# Patient Record
Sex: FEMALE | Race: WHITE | Employment: OTHER | URBAN - METROPOLITAN AREA
[De-identification: names, ages, dates, MRNs, and addresses within clinical notes are randomized per-mention and may not be internally consistent; named-entity substitution may affect disease eponyms.]

---

## 2017-01-13 ENCOUNTER — OFFICE VISIT (OUTPATIENT)
Dept: ORTHOPEDIC SURGERY | Age: 52
End: 2017-01-13

## 2017-01-13 VITALS
BODY MASS INDEX: 37.74 KG/M2 | WEIGHT: 249 LBS | HEIGHT: 68 IN | SYSTOLIC BLOOD PRESSURE: 151 MMHG | DIASTOLIC BLOOD PRESSURE: 88 MMHG | HEART RATE: 83 BPM

## 2017-01-13 DIAGNOSIS — M47.26 OSTEOARTHRITIS OF SPINE WITH RADICULOPATHY, LUMBAR REGION: Primary | ICD-10-CM

## 2017-01-13 PROCEDURE — 99213 OFFICE O/P EST LOW 20 MIN: CPT | Performed by: ORTHOPAEDIC SURGERY

## 2017-01-16 ENCOUNTER — TELEPHONE (OUTPATIENT)
Dept: ORTHOPEDIC SURGERY | Age: 52
End: 2017-01-16

## 2017-01-16 ENCOUNTER — TELEPHONE (OUTPATIENT)
Dept: FAMILY MEDICINE CLINIC | Age: 52
End: 2017-01-16

## 2017-01-19 ENCOUNTER — TELEPHONE (OUTPATIENT)
Dept: ORTHOPEDIC SURGERY | Age: 52
End: 2017-01-19

## 2017-02-19 RX ORDER — LEVOTHYROXINE SODIUM 0.12 MG/1
TABLET ORAL
Qty: 60 TABLET | Refills: 1 | Status: SHIPPED | OUTPATIENT
Start: 2017-02-19 | End: 2017-04-10 | Stop reason: SDUPTHER

## 2017-02-19 RX ORDER — AMLODIPINE BESYLATE 10 MG/1
TABLET ORAL
Qty: 30 TABLET | Refills: 0 | Status: SHIPPED | OUTPATIENT
Start: 2017-02-19 | End: 2017-04-01 | Stop reason: SDUPTHER

## 2017-03-08 ENCOUNTER — TELEPHONE (OUTPATIENT)
Dept: ORTHOPEDIC SURGERY | Age: 52
End: 2017-03-08

## 2017-03-28 ENCOUNTER — OFFICE VISIT (OUTPATIENT)
Dept: ORTHOPEDIC SURGERY | Age: 52
End: 2017-03-28

## 2017-03-28 VITALS
BODY MASS INDEX: 37.74 KG/M2 | WEIGHT: 249 LBS | HEIGHT: 68 IN | DIASTOLIC BLOOD PRESSURE: 86 MMHG | SYSTOLIC BLOOD PRESSURE: 155 MMHG | HEART RATE: 87 BPM

## 2017-03-28 DIAGNOSIS — M51.36 LUMBAR DEGENERATIVE DISC DISEASE: Primary | ICD-10-CM

## 2017-03-28 PROCEDURE — 99213 OFFICE O/P EST LOW 20 MIN: CPT | Performed by: ORTHOPAEDIC SURGERY

## 2017-04-02 RX ORDER — AMLODIPINE BESYLATE 10 MG/1
TABLET ORAL
Qty: 30 TABLET | Refills: 0 | Status: SHIPPED | OUTPATIENT
Start: 2017-04-02 | End: 2017-05-10 | Stop reason: SDUPTHER

## 2017-04-10 ENCOUNTER — PROCEDURE VISIT (OUTPATIENT)
Dept: FAMILY MEDICINE CLINIC | Age: 52
End: 2017-04-10

## 2017-04-10 VITALS
SYSTOLIC BLOOD PRESSURE: 136 MMHG | HEIGHT: 68 IN | DIASTOLIC BLOOD PRESSURE: 80 MMHG | WEIGHT: 245.8 LBS | BODY MASS INDEX: 37.25 KG/M2

## 2017-04-10 DIAGNOSIS — M17.0 PRIMARY OSTEOARTHRITIS OF BOTH KNEES: ICD-10-CM

## 2017-04-10 DIAGNOSIS — R21 RASH: Primary | ICD-10-CM

## 2017-04-10 DIAGNOSIS — E55.9 VITAMIN D DEFICIENCY: ICD-10-CM

## 2017-04-10 DIAGNOSIS — M51.16 LUMBAR DISC DISEASE WITH RADICULOPATHY: ICD-10-CM

## 2017-04-10 PROCEDURE — 11100 PR BIOPSY OF SKIN LESION: CPT | Performed by: FAMILY MEDICINE

## 2017-04-10 PROCEDURE — 11101 PR BIOPSY, EACH ADDED LESION: CPT | Performed by: FAMILY MEDICINE

## 2017-04-10 PROCEDURE — 99214 OFFICE O/P EST MOD 30 MIN: CPT | Performed by: FAMILY MEDICINE

## 2017-04-19 ENCOUNTER — TELEPHONE (OUTPATIENT)
Dept: FAMILY MEDICINE CLINIC | Age: 52
End: 2017-04-19

## 2017-04-20 RX ORDER — PREDNISONE 10 MG/1
10 TABLET ORAL DAILY
Qty: 18 TABLET | Refills: 0 | Status: SHIPPED | OUTPATIENT
Start: 2017-04-20 | End: 2017-11-02 | Stop reason: ALTCHOICE

## 2017-05-10 RX ORDER — AMLODIPINE BESYLATE 10 MG/1
TABLET ORAL
Qty: 30 TABLET | Refills: 0 | Status: SHIPPED | OUTPATIENT
Start: 2017-05-10 | End: 2017-06-10 | Stop reason: SDUPTHER

## 2017-05-10 RX ORDER — LEVOTHYROXINE SODIUM 0.12 MG/1
TABLET ORAL
Qty: 60 TABLET | Refills: 0 | Status: SHIPPED | OUTPATIENT
Start: 2017-05-10 | End: 2017-06-10 | Stop reason: SDUPTHER

## 2017-06-11 RX ORDER — AMLODIPINE BESYLATE 10 MG/1
TABLET ORAL
Qty: 30 TABLET | Refills: 0 | Status: SHIPPED | OUTPATIENT
Start: 2017-06-11 | End: 2017-07-17 | Stop reason: SDUPTHER

## 2017-06-11 RX ORDER — LEVOTHYROXINE SODIUM 0.12 MG/1
TABLET ORAL
Qty: 60 TABLET | Refills: 0 | Status: SHIPPED | OUTPATIENT
Start: 2017-06-11 | End: 2017-07-17 | Stop reason: SDUPTHER

## 2017-07-17 RX ORDER — LEVOTHYROXINE SODIUM 0.12 MG/1
TABLET ORAL
Qty: 60 TABLET | Refills: 0 | Status: SHIPPED | OUTPATIENT
Start: 2017-07-17 | End: 2017-08-14 | Stop reason: SDUPTHER

## 2017-07-17 RX ORDER — AMLODIPINE BESYLATE 10 MG/1
TABLET ORAL
Qty: 30 TABLET | Refills: 0 | Status: SHIPPED | OUTPATIENT
Start: 2017-07-17 | End: 2017-08-14 | Stop reason: SDUPTHER

## 2017-08-15 RX ORDER — LEVOTHYROXINE SODIUM 0.12 MG/1
TABLET ORAL
Qty: 60 TABLET | Refills: 0 | Status: SHIPPED | OUTPATIENT
Start: 2017-08-15 | End: 2017-09-21 | Stop reason: SDUPTHER

## 2017-08-15 RX ORDER — AMLODIPINE BESYLATE 10 MG/1
TABLET ORAL
Qty: 30 TABLET | Refills: 0 | Status: SHIPPED | OUTPATIENT
Start: 2017-08-15 | End: 2017-11-02

## 2017-09-21 DIAGNOSIS — M25.551 PAIN OF BOTH HIP JOINTS: ICD-10-CM

## 2017-09-21 DIAGNOSIS — M25.552 PAIN OF BOTH HIP JOINTS: ICD-10-CM

## 2017-09-21 RX ORDER — AMLODIPINE BESYLATE 10 MG/1
TABLET ORAL
Qty: 30 TABLET | Refills: 0 | Status: SHIPPED | OUTPATIENT
Start: 2017-09-21 | End: 2017-10-29 | Stop reason: SDUPTHER

## 2017-09-21 RX ORDER — LEVOTHYROXINE SODIUM 0.12 MG/1
TABLET ORAL
Qty: 60 TABLET | Refills: 0 | Status: SHIPPED | OUTPATIENT
Start: 2017-09-21 | End: 2017-10-29 | Stop reason: SDUPTHER

## 2017-09-21 RX ORDER — MELOXICAM 15 MG/1
TABLET ORAL
Qty: 30 TABLET | Refills: 1 | Status: SHIPPED | OUTPATIENT
Start: 2017-09-21 | End: 2017-12-01 | Stop reason: SDUPTHER

## 2017-10-29 RX ORDER — AMLODIPINE BESYLATE 10 MG/1
TABLET ORAL
Qty: 30 TABLET | Refills: 0 | Status: SHIPPED | OUTPATIENT
Start: 2017-10-29 | End: 2017-11-02 | Stop reason: SDUPTHER

## 2017-10-29 RX ORDER — LEVOTHYROXINE SODIUM 0.12 MG/1
TABLET ORAL
Qty: 60 TABLET | Refills: 0 | Status: SHIPPED | OUTPATIENT
Start: 2017-10-29 | End: 2017-11-02 | Stop reason: SDUPTHER

## 2017-11-02 ENCOUNTER — OFFICE VISIT (OUTPATIENT)
Dept: FAMILY MEDICINE CLINIC | Age: 52
End: 2017-11-02

## 2017-11-02 VITALS
DIASTOLIC BLOOD PRESSURE: 88 MMHG | SYSTOLIC BLOOD PRESSURE: 144 MMHG | HEIGHT: 68 IN | BODY MASS INDEX: 39.86 KG/M2 | WEIGHT: 263 LBS

## 2017-11-02 DIAGNOSIS — N39.3 STRESS INCONTINENCE: ICD-10-CM

## 2017-11-02 DIAGNOSIS — M15.9 PRIMARY OSTEOARTHRITIS INVOLVING MULTIPLE JOINTS: ICD-10-CM

## 2017-11-02 DIAGNOSIS — R60.1 GENERALIZED EDEMA: Primary | ICD-10-CM

## 2017-11-02 PROCEDURE — 99213 OFFICE O/P EST LOW 20 MIN: CPT | Performed by: FAMILY MEDICINE

## 2017-11-02 RX ORDER — OXYBUTYNIN CHLORIDE 10 MG/1
10 TABLET, EXTENDED RELEASE ORAL DAILY
Qty: 30 TABLET | Refills: 3 | Status: SHIPPED | OUTPATIENT
Start: 2017-11-02 | End: 2018-03-27 | Stop reason: SDUPTHER

## 2017-11-02 RX ORDER — TRIAMTERENE AND HYDROCHLOROTHIAZIDE 37.5; 25 MG/1; MG/1
1 CAPSULE ORAL DAILY
Qty: 30 CAPSULE | Refills: 3 | Status: SHIPPED | OUTPATIENT
Start: 2017-11-02 | End: 2018-03-27 | Stop reason: SDUPTHER

## 2017-11-02 RX ORDER — DULOXETIN HYDROCHLORIDE 30 MG/1
30 CAPSULE, DELAYED RELEASE ORAL DAILY
Qty: 30 CAPSULE | Refills: 3 | Status: SHIPPED | OUTPATIENT
Start: 2017-11-02 | End: 2018-11-14 | Stop reason: ALTCHOICE

## 2017-11-02 ASSESSMENT — ENCOUNTER SYMPTOMS
BLURRED VISION: 0
SHORTNESS OF BREATH: 0
ORTHOPNEA: 0

## 2017-12-01 DIAGNOSIS — M25.552 PAIN OF BOTH HIP JOINTS: ICD-10-CM

## 2017-12-01 DIAGNOSIS — M25.551 PAIN OF BOTH HIP JOINTS: ICD-10-CM

## 2017-12-01 RX ORDER — LEVOTHYROXINE SODIUM 0.12 MG/1
TABLET ORAL
Qty: 60 TABLET | Refills: 0 | Status: SHIPPED | OUTPATIENT
Start: 2017-12-01 | End: 2018-01-03 | Stop reason: SDUPTHER

## 2017-12-01 RX ORDER — MELOXICAM 15 MG/1
TABLET ORAL
Qty: 30 TABLET | Refills: 0 | Status: SHIPPED | OUTPATIENT
Start: 2017-12-01 | End: 2018-01-03 | Stop reason: SDUPTHER

## 2018-01-03 DIAGNOSIS — M25.552 PAIN OF BOTH HIP JOINTS: ICD-10-CM

## 2018-01-03 DIAGNOSIS — M25.551 PAIN OF BOTH HIP JOINTS: ICD-10-CM

## 2018-01-03 RX ORDER — LEVOTHYROXINE SODIUM 0.12 MG/1
TABLET ORAL
Qty: 60 TABLET | Refills: 0 | Status: SHIPPED | OUTPATIENT
Start: 2018-01-03 | End: 2018-02-11 | Stop reason: SDUPTHER

## 2018-01-03 RX ORDER — MELOXICAM 15 MG/1
TABLET ORAL
Qty: 30 TABLET | Refills: 0 | Status: SHIPPED | OUTPATIENT
Start: 2018-01-03 | End: 2018-02-11 | Stop reason: SDUPTHER

## 2018-02-11 DIAGNOSIS — M25.552 PAIN OF BOTH HIP JOINTS: ICD-10-CM

## 2018-02-11 DIAGNOSIS — M25.551 PAIN OF BOTH HIP JOINTS: ICD-10-CM

## 2018-02-11 RX ORDER — MELOXICAM 15 MG/1
TABLET ORAL
Qty: 30 TABLET | Refills: 0 | Status: SHIPPED | OUTPATIENT
Start: 2018-02-11 | End: 2018-03-27 | Stop reason: SDUPTHER

## 2018-02-11 RX ORDER — LEVOTHYROXINE SODIUM 0.12 MG/1
TABLET ORAL
Qty: 60 TABLET | Refills: 0 | Status: SHIPPED | OUTPATIENT
Start: 2018-02-11 | End: 2018-03-27 | Stop reason: SDUPTHER

## 2018-02-20 ENCOUNTER — TELEPHONE (OUTPATIENT)
Dept: ORTHOPEDIC SURGERY | Age: 53
End: 2018-02-20

## 2018-03-27 DIAGNOSIS — N39.3 STRESS INCONTINENCE: ICD-10-CM

## 2018-03-27 DIAGNOSIS — M25.552 PAIN OF BOTH HIP JOINTS: ICD-10-CM

## 2018-03-27 DIAGNOSIS — M25.551 PAIN OF BOTH HIP JOINTS: ICD-10-CM

## 2018-03-27 DIAGNOSIS — R60.1 GENERALIZED EDEMA: ICD-10-CM

## 2018-03-27 RX ORDER — TRIAMTERENE AND HYDROCHLOROTHIAZIDE 37.5; 25 MG/1; MG/1
CAPSULE ORAL
Qty: 30 CAPSULE | Refills: 2 | Status: SHIPPED | OUTPATIENT
Start: 2018-03-27 | End: 2018-07-08 | Stop reason: SDUPTHER

## 2018-03-27 RX ORDER — LEVOTHYROXINE SODIUM 0.12 MG/1
TABLET ORAL
Qty: 60 TABLET | Refills: 0 | Status: SHIPPED | OUTPATIENT
Start: 2018-03-27 | End: 2018-04-27

## 2018-03-27 RX ORDER — MELOXICAM 15 MG/1
TABLET ORAL
Qty: 30 TABLET | Refills: 0 | Status: SHIPPED | OUTPATIENT
Start: 2018-03-27 | End: 2018-04-27 | Stop reason: SDUPTHER

## 2018-03-27 RX ORDER — OXYBUTYNIN CHLORIDE 10 MG/1
TABLET, EXTENDED RELEASE ORAL
Qty: 30 TABLET | Refills: 2 | Status: SHIPPED | OUTPATIENT
Start: 2018-03-27 | End: 2018-07-08 | Stop reason: SDUPTHER

## 2018-04-27 ENCOUNTER — TELEPHONE (OUTPATIENT)
Dept: FAMILY MEDICINE CLINIC | Age: 53
End: 2018-04-27

## 2018-04-27 ENCOUNTER — TELEPHONE (OUTPATIENT)
Dept: INTERNAL MEDICINE CLINIC | Age: 53
End: 2018-04-27

## 2018-04-27 ENCOUNTER — OFFICE VISIT (OUTPATIENT)
Dept: FAMILY MEDICINE CLINIC | Age: 53
End: 2018-04-27

## 2018-04-27 VITALS
DIASTOLIC BLOOD PRESSURE: 80 MMHG | OXYGEN SATURATION: 96 % | RESPIRATION RATE: 16 BRPM | HEIGHT: 68 IN | SYSTOLIC BLOOD PRESSURE: 118 MMHG | BODY MASS INDEX: 39.4 KG/M2 | HEART RATE: 84 BPM | WEIGHT: 260 LBS

## 2018-04-27 DIAGNOSIS — M51.9 LUMBAR DISC DISEASE: ICD-10-CM

## 2018-04-27 DIAGNOSIS — M25.50 POLYARTHRALGIA: Primary | ICD-10-CM

## 2018-04-27 DIAGNOSIS — L30.9 CHRONIC DERMATITIS: ICD-10-CM

## 2018-04-27 LAB
A/G RATIO: 1.5 (ref 1.1–2.2)
ALBUMIN SERPL-MCNC: 4.4 G/DL (ref 3.4–5)
ALP BLD-CCNC: 117 U/L (ref 40–129)
ALT SERPL-CCNC: 22 U/L (ref 10–40)
ANION GAP SERPL CALCULATED.3IONS-SCNC: 16 MMOL/L (ref 3–16)
AST SERPL-CCNC: 20 U/L (ref 15–37)
BILIRUB SERPL-MCNC: <0.2 MG/DL (ref 0–1)
BUN BLDV-MCNC: 20 MG/DL (ref 7–20)
CALCIUM SERPL-MCNC: 9.5 MG/DL (ref 8.3–10.6)
CHLORIDE BLD-SCNC: 95 MMOL/L (ref 99–110)
CO2: 28 MMOL/L (ref 21–32)
CREAT SERPL-MCNC: 0.9 MG/DL (ref 0.6–1.1)
GFR AFRICAN AMERICAN: >60
GFR NON-AFRICAN AMERICAN: >60
GLOBULIN: 2.9 G/DL
GLUCOSE BLD-MCNC: 104 MG/DL (ref 70–99)
POTASSIUM SERPL-SCNC: 4.7 MMOL/L (ref 3.5–5.1)
RHEUMATOID FACTOR: <10 IU/ML
SEDIMENTATION RATE, ERYTHROCYTE: 26 MM/HR (ref 0–30)
SODIUM BLD-SCNC: 139 MMOL/L (ref 136–145)
TOTAL PROTEIN: 7.3 G/DL (ref 6.4–8.2)

## 2018-04-27 PROCEDURE — 96160 PT-FOCUSED HLTH RISK ASSMT: CPT | Performed by: FAMILY MEDICINE

## 2018-04-27 PROCEDURE — 99214 OFFICE O/P EST MOD 30 MIN: CPT | Performed by: FAMILY MEDICINE

## 2018-04-27 PROCEDURE — 36415 COLL VENOUS BLD VENIPUNCTURE: CPT | Performed by: FAMILY MEDICINE

## 2018-04-27 RX ORDER — PREDNISONE 10 MG/1
10 TABLET ORAL DAILY
Qty: 18 TABLET | Refills: 0 | Status: SHIPPED | OUTPATIENT
Start: 2018-04-27 | End: 2018-05-03

## 2018-04-27 ASSESSMENT — PATIENT HEALTH QUESTIONNAIRE - PHQ9
7. TROUBLE CONCENTRATING ON THINGS, SUCH AS READING THE NEWSPAPER OR WATCHING TELEVISION: 0
SUM OF ALL RESPONSES TO PHQ9 QUESTIONS 1 & 2: 2
6. FEELING BAD ABOUT YOURSELF - OR THAT YOU ARE A FAILURE OR HAVE LET YOURSELF OR YOUR FAMILY DOWN: 3
1. LITTLE INTEREST OR PLEASURE IN DOING THINGS: 0
5. POOR APPETITE OR OVEREATING: 3
SUM OF ALL RESPONSES TO PHQ QUESTIONS 1-9: 17
4. FEELING TIRED OR HAVING LITTLE ENERGY: 3
3. TROUBLE FALLING OR STAYING ASLEEP: 3
10. IF YOU CHECKED OFF ANY PROBLEMS, HOW DIFFICULT HAVE THESE PROBLEMS MADE IT FOR YOU TO DO YOUR WORK, TAKE CARE OF THINGS AT HOME, OR GET ALONG WITH OTHER PEOPLE: 3
9. THOUGHTS THAT YOU WOULD BE BETTER OFF DEAD, OR OF HURTING YOURSELF: 0
2. FEELING DOWN, DEPRESSED OR HOPELESS: 2
8. MOVING OR SPEAKING SO SLOWLY THAT OTHER PEOPLE COULD HAVE NOTICED. OR THE OPPOSITE, BEING SO FIGETY OR RESTLESS THAT YOU HAVE BEEN MOVING AROUND A LOT MORE THAN USUAL: 3

## 2018-04-28 ASSESSMENT — ENCOUNTER SYMPTOMS: BACK PAIN: 1

## 2018-04-30 LAB
ANA INTERPRETATION: NORMAL
ANTI-NUCLEAR ANTIBODY (ANA): NEGATIVE

## 2018-05-03 ENCOUNTER — OFFICE VISIT (OUTPATIENT)
Dept: RHEUMATOLOGY | Age: 53
End: 2018-05-03

## 2018-05-03 VITALS
HEIGHT: 68 IN | HEART RATE: 88 BPM | BODY MASS INDEX: 39.31 KG/M2 | DIASTOLIC BLOOD PRESSURE: 85 MMHG | SYSTOLIC BLOOD PRESSURE: 155 MMHG | WEIGHT: 259.4 LBS | OXYGEN SATURATION: 95 %

## 2018-05-03 DIAGNOSIS — R20.2 TINGLING IN EXTREMITIES: ICD-10-CM

## 2018-05-03 DIAGNOSIS — R21 SKIN RASH: ICD-10-CM

## 2018-05-03 DIAGNOSIS — M25.50 POLYARTHRALGIA: Primary | ICD-10-CM

## 2018-05-03 PROCEDURE — 99244 OFF/OP CNSLTJ NEW/EST MOD 40: CPT | Performed by: INTERNAL MEDICINE

## 2018-05-03 RX ORDER — PREDNISONE 1 MG/1
TABLET ORAL
Qty: 60 TABLET | Refills: 0 | Status: SHIPPED | OUTPATIENT
Start: 2018-05-03 | End: 2018-06-05

## 2018-05-07 ENCOUNTER — TELEPHONE (OUTPATIENT)
Dept: ORTHOPEDIC SURGERY | Age: 53
End: 2018-05-07

## 2018-05-07 ENCOUNTER — HOSPITAL ENCOUNTER (OUTPATIENT)
Dept: OTHER | Age: 53
Discharge: HOME | End: 2018-05-07
Attending: INTERNAL MEDICINE | Admitting: INTERNAL MEDICINE

## 2018-05-07 DIAGNOSIS — R20.2 TINGLING IN EXTREMITIES: ICD-10-CM

## 2018-05-07 DIAGNOSIS — M25.50 POLYARTHRALGIA: ICD-10-CM

## 2018-05-07 LAB
BASOPHILS ABSOLUTE: 0.1 K/UL (ref 0–0.2)
BASOPHILS RELATIVE PERCENT: 0.5 %
C-REACTIVE PROTEIN: 3.7 MG/L (ref 0–5.1)
EOSINOPHILS ABSOLUTE: 0.1 K/UL (ref 0–0.6)
EOSINOPHILS RELATIVE PERCENT: 1.1 %
HCT VFR BLD CALC: 46.8 % (ref 36–48)
HEMOGLOBIN: 15.6 G/DL (ref 12–16)
HEPATITIS B CORE IGM ANTIBODY: NORMAL
HEPATITIS B SURFACE ANTIGEN INTERPRETATION: NORMAL
HEPATITIS C ANTIBODY INTERPRETATION: NORMAL
LYMPHOCYTES ABSOLUTE: 2.4 K/UL (ref 1–5.1)
LYMPHOCYTES RELATIVE PERCENT: 17.7 %
MCH RBC QN AUTO: 30.8 PG (ref 26–34)
MCHC RBC AUTO-ENTMCNC: 33.3 G/DL (ref 31–36)
MCV RBC AUTO: 92.5 FL (ref 80–100)
MONOCYTES ABSOLUTE: 0.8 K/UL (ref 0–1.3)
MONOCYTES RELATIVE PERCENT: 6 %
NEUTROPHILS ABSOLUTE: 10.1 K/UL (ref 1.7–7.7)
NEUTROPHILS RELATIVE PERCENT: 74.7 %
PDW BLD-RTO: 16.4 % (ref 12.4–15.4)
PLATELET # BLD: 185 K/UL (ref 135–450)
PMV BLD AUTO: 10.5 FL (ref 5–10.5)
RBC # BLD: 5.06 M/UL (ref 4–5.2)
SEDIMENTATION RATE, ERYTHROCYTE: 12 MM/HR (ref 0–30)
TSH REFLEX FT4: 3.75 UIU/ML (ref 0.27–4.2)
VITAMIN B-12: 478 PG/ML (ref 211–911)
WBC # BLD: 13.5 K/UL (ref 4–11)

## 2018-05-09 LAB
ANA BY IFA: NORMAL
CCP IGG ANTIBODIES: 3 UNITS (ref 0–19)

## 2018-05-11 ENCOUNTER — PROCEDURE VISIT (OUTPATIENT)
Dept: NEUROLOGY | Age: 53
End: 2018-05-11

## 2018-05-11 DIAGNOSIS — R20.0 BILATERAL LEG NUMBNESS: Primary | ICD-10-CM

## 2018-05-11 PROCEDURE — 95909 NRV CNDJ TST 5-6 STUDIES: CPT | Performed by: PSYCHIATRY & NEUROLOGY

## 2018-05-11 PROCEDURE — 95886 MUSC TEST DONE W/N TEST COMP: CPT | Performed by: PSYCHIATRY & NEUROLOGY

## 2018-05-17 ENCOUNTER — OFFICE VISIT (OUTPATIENT)
Dept: RHEUMATOLOGY | Age: 53
End: 2018-05-17

## 2018-05-17 VITALS
SYSTOLIC BLOOD PRESSURE: 161 MMHG | OXYGEN SATURATION: 98 % | DIASTOLIC BLOOD PRESSURE: 84 MMHG | HEIGHT: 68 IN | BODY MASS INDEX: 39.65 KG/M2 | HEART RATE: 77 BPM | WEIGHT: 261.6 LBS

## 2018-05-17 DIAGNOSIS — G60.8 SENSORY POLYNEUROPATHY: ICD-10-CM

## 2018-05-17 DIAGNOSIS — R21 SKIN RASH: ICD-10-CM

## 2018-05-17 DIAGNOSIS — M19.90 INFLAMMATORY ARTHRITIS: Primary | ICD-10-CM

## 2018-05-17 PROCEDURE — 99214 OFFICE O/P EST MOD 30 MIN: CPT | Performed by: INTERNAL MEDICINE

## 2018-05-17 RX ORDER — PRENATAL VIT/IRON FUM/FOLIC AC 27 MG-1 MG
TABLET ORAL
COMMUNITY
End: 2019-07-18 | Stop reason: ALTCHOICE

## 2018-05-17 RX ORDER — HYDROXYCHLOROQUINE SULFATE 200 MG/1
200 TABLET, FILM COATED ORAL 2 TIMES DAILY
Qty: 60 TABLET | Refills: 5 | Status: SHIPPED | OUTPATIENT
Start: 2018-05-17 | End: 2019-04-25

## 2018-05-18 LAB
ALBUMIN SERPL-MCNC: 3.7 G/DL (ref 3.1–4.9)
ALPHA-1-GLOBULIN: 0.3 G/DL (ref 0.2–0.4)
ALPHA-2-GLOBULIN: 1.1 G/DL (ref 0.4–1.1)
BETA GLOBULIN: 1 G/DL (ref 0.9–1.6)
ESTIMATED AVERAGE GLUCOSE: 148.5 MG/DL
GAMMA GLOBULIN: 1.1 G/DL (ref 0.6–1.8)
HBA1C MFR BLD: 6.8 %
HIV AG/AB: NORMAL
HIV ANTIGEN: NORMAL
HIV-1 ANTIBODY: NORMAL
HIV-2 AB: NORMAL
SPE/IFE INTERPRETATION: NORMAL
TOTAL PROTEIN: 7.2 G/DL (ref 6.4–8.2)

## 2018-05-19 LAB
ENA TO SSB (LA) ANTIBODY: 0 AU/ML (ref 0–40)
SSA 52 (RO) (ENA) AB, IGG: 1 AU/ML (ref 0–40)
SSA 60 (RO) (ENA) AB, IGG: 0 AU/ML (ref 0–40)

## 2018-05-20 LAB — VITAMIN B1, PLASMA: 10 NMOL/L (ref 4–15)

## 2018-06-27 ENCOUNTER — TELEPHONE (OUTPATIENT)
Dept: FAMILY MEDICINE CLINIC | Age: 53
End: 2018-06-27

## 2018-06-27 RX ORDER — NICOTINE 21 MG/24HR
1 PATCH, TRANSDERMAL 24 HOURS TRANSDERMAL EVERY 24 HOURS
Qty: 30 PATCH | Refills: 3 | Status: SHIPPED | OUTPATIENT
Start: 2018-06-27 | End: 2018-11-14 | Stop reason: ALTCHOICE

## 2018-07-08 DIAGNOSIS — N39.3 STRESS INCONTINENCE: ICD-10-CM

## 2018-07-08 DIAGNOSIS — R60.1 GENERALIZED EDEMA: ICD-10-CM

## 2018-07-08 RX ORDER — TRIAMTERENE AND HYDROCHLOROTHIAZIDE 37.5; 25 MG/1; MG/1
CAPSULE ORAL
Qty: 30 CAPSULE | Refills: 1 | Status: SHIPPED | OUTPATIENT
Start: 2018-07-08 | End: 2018-09-18 | Stop reason: SDUPTHER

## 2018-07-08 RX ORDER — OXYBUTYNIN CHLORIDE 10 MG/1
TABLET, EXTENDED RELEASE ORAL
Qty: 30 TABLET | Refills: 1 | Status: SHIPPED | OUTPATIENT
Start: 2018-07-08 | End: 2018-09-18 | Stop reason: SDUPTHER

## 2018-07-13 ENCOUNTER — OFFICE VISIT (OUTPATIENT)
Dept: NEUROLOGY | Age: 53
End: 2018-07-13

## 2018-07-13 VITALS
SYSTOLIC BLOOD PRESSURE: 160 MMHG | WEIGHT: 257 LBS | HEIGHT: 68 IN | HEART RATE: 80 BPM | DIASTOLIC BLOOD PRESSURE: 97 MMHG | BODY MASS INDEX: 38.95 KG/M2

## 2018-07-13 DIAGNOSIS — R73.9 HYPERGLYCEMIA: ICD-10-CM

## 2018-07-13 DIAGNOSIS — G60.8 SENSORY POLYNEUROPATHY: Primary | ICD-10-CM

## 2018-07-13 PROCEDURE — 99244 OFF/OP CNSLTJ NEW/EST MOD 40: CPT | Performed by: PSYCHIATRY & NEUROLOGY

## 2018-07-13 NOTE — PROGRESS NOTES
above     Hematologic/lymphatic:  [x]  Bleeding    [x]  Easy bruising   []  Anemia  [] Denies all of the above     Musculoskeletal:   [] Back pain       []  Myalgias    []  Neck pain           [x] Denies all of the above    Neurological: As noted in HPI    Behavioral/Psych:   [x] Anxiety    [x]  Depression     [x]  Mood swings     [] Denies all of the above     Endocrine:   []  Temperature intolerance     [x] Fatigue      [] Denies all of the above     Allergic/Immunologic:   [] Hay fever    [x] Denies all of the above     Past Medical History:   Diagnosis Date    Anesthesia complication     severe headache after woke up after wisdom teeth    Chronic back pain     Condyloma acuminatum     Headache(784.0)     Hypertension     Hypothyroidism     IBS (irritable bowel syndrome)     Metrorrhagia     Neuropathy ([address]     Osteoarthritis     Panic attack      Family History   Problem Relation Age of Onset    Diabetes Other     High Blood Pressure Other     Stroke Other     Cancer Other         Bone    Alcohol Abuse Other     Arthritis Father     Heart Disease Father         cabg    Diabetes Father     Stroke Father     Cancer Sister         breast cancer    Cancer Brother         lung     Social History     Social History    Marital status:      Spouse name: N/A    Number of children: 2    Years of education: N/A     Occupational History          Social History Main Topics    Smoking status: Current Every Day Smoker     Packs/day: 1.00     Years: 30.00     Types: Cigarettes    Smokeless tobacco: Never Used      Comment: encouraged to stop smoking     Alcohol use 0.0 oz/week      Comment: rare    Drug use: No    Sexual activity: Not Currently     Other Topics Concern    None     Social History Narrative    None       PHYSICAL EXAMINATION:  BP (!) 160/97   Pulse 80   Ht 5' 8\" (1.727 m)   Wt 257 lb (116.6 kg)   LMP 01/27/2015   BMI 39.08 kg/m²   Appearance: Well appearing, obese and in no distress  Mental Status Exam: Patient is alert, oriented to person, place and time. Recent and remote memory is normal  Fund of Knowledge is normal  Attention/concentration is normal.   Speech : No dysarthria  Language : No aphasia  Funduscopic Exam: sharp disc margins  Cranial Nerves:   II: Visual fields:  Full to confrontation  III: Pupils:  equal, round, reactive to light  III,IV,VI: Extra Ocular Movements are intact. No nystagmus  V: Facial sensation is intact to pin prick and light touch  VII: Facial strength and movements: intact and symmetric smile,cheek puffing and eyebrow elevation  VIII: Hearing:  Intact to finger rub bilaterally  IX: Palate  elevation is symmetric  XI: Shoulder shrug is intact  XII: Tongue movements are normal  Motor:  Muscle tone and bulk are normal.   Strength is symmetrical 5/5 in all four extremities. Sensory: Decreased to light touch and  pin prick in bilateral distal lower extremities  Coordination:  Normal  Finger to Nose and Heel to Shin bilaterally    . Reflexes:  DTR 1 in the upper extremities barely elicitable in the knees and 1 at the ankles bilaterally  Plantar response: Flexor bilaterally  Gait: Gait is impaired and patient uses a cane to ambulate Romberg: negative  Vascular: No carotid bruit bilaterally        DATA:  LABS:  General Labs:    CBC:   Lab Results   Component Value Date    WBC 13.5 05/07/2018    RBC 5.06 05/07/2018    HGB 15.6 05/07/2018    HCT 46.8 05/07/2018    MCV 92.5 05/07/2018    MCH 30.8 05/07/2018    MCHC 33.3 05/07/2018    RDW 16.4 05/07/2018     05/07/2018    MPV 10.5 05/07/2018     BMP:    Lab Results   Component Value Date     04/27/2018    K 4.7 04/27/2018    CL 95 04/27/2018    CO2 28 04/27/2018    BUN 20 04/27/2018    LABALBU 3.7 05/17/2018    CREATININE 0.9 04/27/2018    CALCIUM 9.5 04/27/2018    GFRAA >60 04/27/2018    GFRAA >60 07/12/2010    LABGLOM >60 04/27/2018    GLUCOSE 104 04/27/2018

## 2018-07-13 NOTE — PATIENT INSTRUCTIONS
Please call with any questions or concerns:   [facility]  @ [phone]. LAB RESULTS:  Please obtain any labs or diagnostic tests as discussed today. You should call the office to check the results. Please allow  3 to 7 days for us to get these results. MEDICATION LIST:  Please bring an accurate list of your medications to every visit. APPOINTMENT CONFIRMATION:  We will call you the day before your scheduled appointment to confirm. If we are unable to reach you, you MUST call back by the end of the day to confirm the appointment or we may be forced to cancel.

## 2018-07-13 NOTE — LETTER
[facility]  [address] [address]  Phone: [phone]  Fax: [phone]    Fifi Concepcion MD        July 13, 2018       Patient: Katherine Sharif   MR Number: [id number]   YOB: 1965   Date of Visit: 7/13/2018       Dear Dr. Sharlene Rockwell:    Thank you for the request for consultation for Pavel Lomeli to me for the evaluation of Sensory polyneuropathy. Below are the relevant portions of my assessment and plan of care. NEUROLOGY CONSULTATION     Chief Complaint   Patient presents with    Peripheral Neuropathy     Patient is here today to establish care. Patient says that she has stinging in her lower extremities. HISTORY OF PRESENT ILLNESS :    Pavel Lomeli is a 45 y.o. female who is referred by Dr. Sharlene Rockwell   History was obtained from patient  Patient was referred for evaluation of tingling numbness and burning sensation in her legs and feet. Symptoms started about a year ago. Onset was gradual and the symptoms of slowly progressive. She has recently developed joint pain and was diagnosed by Dr. Sharlene Rockwell as having inflammatory arthritis. Patient was initially on prednisone and now she is on nonsteroidal anti-inflammatory medication. She had an EMG and nerve conduction study several weeks ago which showed a mild sensory polyneuropathy in the lower extremities. She also had an extensive metabolic workup. Patient has chronic low back pain and apparently has been diagnosed with lumbar disc disease.     REVIEW OF SYSTEMS    Constitutional:  []   Chills   [x]  Fatigue   []  Fevers   []  Malaise   []  Weight loss     [] Denies all of the above    Eyes:  []  Double vision   [x]  Blurry vision     [] Denies all of the above    Ears, nose, mouth, throat, and face:   [] Hearing loss    []   Hoarseness      []  Snoring    []  Tinnitus       [x] Denies all of the above     Respiratory: []  Cough    []  Shortness of breath         [x] Denies all of the above     Cardiovascular:   []  Chest pain    []  Exertional chest pressure/discomfort           [] Palpitations    []  Syncope     [x] Denies all of the above    Gastrointestinal:   []  Abdominal pain   []  Constipation    []  Diarrhea    []   Dysphagia                      [x] Denies all of the above    Genitourinary:      []  Frequency   []  Hematuria     []  Urinary incontinence           [x] Denies all of the above     Hematologic/lymphatic:  [x]  Bleeding    [x]  Easy bruising   []  Anemia  [] Denies all of the above     Musculoskeletal:   [] Back pain       []  Myalgias    []  Neck pain           [x] Denies all of the above    Neurological: As noted in HPI    Behavioral/Psych:   [x] Anxiety    [x]  Depression     [x]  Mood swings     [] Denies all of the above     Endocrine:   []  Temperature intolerance     [x] Fatigue      [] Denies all of the above     Allergic/Immunologic:   [] Hay fever    [x] Denies all of the above     Past Medical History:   Diagnosis Date    Anesthesia complication     severe headache after woke up after wisdom teeth    Chronic back pain     Condyloma acuminatum     Headache(784.0)     Hypertension     Hypothyroidism     IBS (irritable bowel syndrome)     Metrorrhagia     Neuropathy ([address]     Osteoarthritis     Panic attack      Family History   Problem Relation Age of Onset    Diabetes Other     High Blood Pressure Other     Stroke Other     Cancer Other         Bone    Alcohol Abuse Other     Arthritis Father     Heart Disease Father         cabg    Diabetes Father     Stroke Father     Cancer Sister         breast cancer    Cancer Brother         lung     Social History     Social History    Marital status:      Spouse name: N/A    Number of children: 2    Years of education: N/A     Occupational History          Social History Main Topics  Smoking status: Current Every Day Smoker     Packs/day: 1.00     Years: 30.00     Types: Cigarettes    Smokeless tobacco: Never Used      Comment: encouraged to stop smoking     Alcohol use 0.0 oz/week      Comment: rare    Drug use: No    Sexual activity: Not Currently     Other Topics Concern    None     Social History Narrative    None       PHYSICAL EXAMINATION:  BP (!) 160/97   Pulse 80   Ht 5' 8\" (1.727 m)   Wt 257 lb (116.6 kg)   LMP 01/27/2015   BMI 39.08 kg/m²    Appearance: Well appearing, obese and in no distress  Mental Status Exam: Patient is alert, oriented to person, place and time. Recent and remote memory is normal  Fund of Knowledge is normal  Attention/concentration is normal.   Speech : No dysarthria  Language : No aphasia  Funduscopic Exam: sharp disc margins  Cranial Nerves:   II: Visual fields:  Full to confrontation  III: Pupils:  equal, round, reactive to light  III,IV,VI: Extra Ocular Movements are intact. No nystagmus  V: Facial sensation is intact to pin prick and light touch  VII: Facial strength and movements: intact and symmetric smile,cheek puffing and eyebrow elevation  VIII: Hearing:  Intact to finger rub bilaterally  IX: Palate  elevation is symmetric  XI: Shoulder shrug is intact  XII: Tongue movements are normal  Motor:  Muscle tone and bulk are normal.   Strength is symmetrical 5/5 in all four extremities. Sensory: Decreased to light touch and  pin prick in bilateral distal lower extremities  Coordination:  Normal  Finger to Nose and Heel to Shin bilaterally    . Reflexes:  DTR 1 in the upper extremities barely elicitable in the knees and 1 at the ankles bilaterally  Plantar response: Flexor bilaterally  Gait: Gait is impaired and patient uses a cane to ambulate Romberg: negative  Vascular: No carotid bruit bilaterally        DATA:  LABS:  General Labs:    CBC:   Lab Results   Component Value Date    WBC 13.5 05/07/2018    RBC 5.06 05/07/2018

## 2018-08-09 ENCOUNTER — TELEPHONE (OUTPATIENT)
Dept: ORTHOPEDIC SURGERY | Age: 53
End: 2018-08-09

## 2018-09-18 DIAGNOSIS — N39.3 STRESS INCONTINENCE: ICD-10-CM

## 2018-09-18 DIAGNOSIS — R60.1 GENERALIZED EDEMA: ICD-10-CM

## 2018-09-18 RX ORDER — OXYBUTYNIN CHLORIDE 10 MG/1
TABLET, EXTENDED RELEASE ORAL
Qty: 30 TABLET | Refills: 0 | Status: SHIPPED | OUTPATIENT
Start: 2018-09-18 | End: 2018-11-02 | Stop reason: SDUPTHER

## 2018-09-18 RX ORDER — TRIAMTERENE AND HYDROCHLOROTHIAZIDE 37.5; 25 MG/1; MG/1
CAPSULE ORAL
Qty: 30 CAPSULE | Refills: 0 | Status: SHIPPED | OUTPATIENT
Start: 2018-09-18 | End: 2018-11-02 | Stop reason: SDUPTHER

## 2018-11-02 DIAGNOSIS — R60.1 GENERALIZED EDEMA: ICD-10-CM

## 2018-11-02 DIAGNOSIS — N39.3 STRESS INCONTINENCE: ICD-10-CM

## 2018-11-02 RX ORDER — TRIAMTERENE AND HYDROCHLOROTHIAZIDE 37.5; 25 MG/1; MG/1
CAPSULE ORAL
Qty: 30 CAPSULE | Refills: 0 | Status: SHIPPED | OUTPATIENT
Start: 2018-11-02 | End: 2018-12-06 | Stop reason: SDUPTHER

## 2018-11-02 RX ORDER — OXYBUTYNIN CHLORIDE 10 MG/1
TABLET, EXTENDED RELEASE ORAL
Qty: 30 TABLET | Refills: 0 | Status: SHIPPED | OUTPATIENT
Start: 2018-11-02 | End: 2018-12-06 | Stop reason: SDUPTHER

## 2018-11-14 ENCOUNTER — OFFICE VISIT (OUTPATIENT)
Dept: FAMILY MEDICINE CLINIC | Age: 53
End: 2018-11-14
Payer: COMMERCIAL

## 2018-11-14 VITALS
WEIGHT: 248 LBS | DIASTOLIC BLOOD PRESSURE: 86 MMHG | HEIGHT: 68 IN | BODY MASS INDEX: 37.59 KG/M2 | SYSTOLIC BLOOD PRESSURE: 138 MMHG

## 2018-11-14 DIAGNOSIS — Z00.00 WELL ADULT EXAM: Primary | ICD-10-CM

## 2018-11-14 DIAGNOSIS — E66.01 CLASS 2 SEVERE OBESITY DUE TO EXCESS CALORIES WITH SERIOUS COMORBIDITY AND BODY MASS INDEX (BMI) OF 37.0 TO 37.9 IN ADULT (HCC): ICD-10-CM

## 2018-11-14 LAB
A/G RATIO: 1.9 (ref 1.1–2.2)
ALBUMIN SERPL-MCNC: 4.7 G/DL (ref 3.4–5)
ALP BLD-CCNC: 109 U/L (ref 40–129)
ALT SERPL-CCNC: 15 U/L (ref 10–40)
ANION GAP SERPL CALCULATED.3IONS-SCNC: 14 MMOL/L (ref 3–16)
AST SERPL-CCNC: 16 U/L (ref 15–37)
BILIRUB SERPL-MCNC: <0.2 MG/DL (ref 0–1)
BUN BLDV-MCNC: 17 MG/DL (ref 7–20)
CALCIUM SERPL-MCNC: 10 MG/DL (ref 8.3–10.6)
CHLORIDE BLD-SCNC: 99 MMOL/L (ref 99–110)
CHOLESTEROL, TOTAL: 216 MG/DL (ref 0–199)
CO2: 29 MMOL/L (ref 21–32)
CREAT SERPL-MCNC: 0.9 MG/DL (ref 0.6–1.1)
GFR AFRICAN AMERICAN: >60
GFR NON-AFRICAN AMERICAN: >60
GLOBULIN: 2.5 G/DL
GLUCOSE BLD-MCNC: 83 MG/DL (ref 70–99)
HCT VFR BLD CALC: 47.6 % (ref 36–48)
HDLC SERPL-MCNC: 38 MG/DL (ref 40–60)
HEMOGLOBIN: 15.9 G/DL (ref 12–16)
LDL CHOLESTEROL CALCULATED: ABNORMAL MG/DL
LDL CHOLESTEROL DIRECT: 144 MG/DL
MCH RBC QN AUTO: 29.8 PG (ref 26–34)
MCHC RBC AUTO-ENTMCNC: 33.4 G/DL (ref 31–36)
MCV RBC AUTO: 89.3 FL (ref 80–100)
PDW BLD-RTO: 15 % (ref 12.4–15.4)
PLATELET # BLD: 169 K/UL (ref 135–450)
PMV BLD AUTO: 10.1 FL (ref 5–10.5)
POTASSIUM SERPL-SCNC: 4.7 MMOL/L (ref 3.5–5.1)
RBC # BLD: 5.33 M/UL (ref 4–5.2)
SODIUM BLD-SCNC: 142 MMOL/L (ref 136–145)
TOTAL PROTEIN: 7.2 G/DL (ref 6.4–8.2)
TRIGL SERPL-MCNC: 341 MG/DL (ref 0–150)
TSH SERPL DL<=0.05 MIU/L-ACNC: 26.45 UIU/ML (ref 0.27–4.2)
VITAMIN D 25-HYDROXY: 40.2 NG/ML
VLDLC SERPL CALC-MCNC: ABNORMAL MG/DL
WBC # BLD: 10.1 K/UL (ref 4–11)

## 2018-11-14 PROCEDURE — 90471 IMMUNIZATION ADMIN: CPT | Performed by: FAMILY MEDICINE

## 2018-11-14 PROCEDURE — 99396 PREV VISIT EST AGE 40-64: CPT | Performed by: FAMILY MEDICINE

## 2018-11-14 PROCEDURE — 90670 PCV13 VACCINE IM: CPT | Performed by: FAMILY MEDICINE

## 2018-11-14 PROCEDURE — 36415 COLL VENOUS BLD VENIPUNCTURE: CPT | Performed by: FAMILY MEDICINE

## 2018-11-14 RX ORDER — PHENTERMINE HYDROCHLORIDE 37.5 MG/1
37.5 TABLET ORAL
Qty: 30 TABLET | Refills: 0 | Status: SHIPPED | OUTPATIENT
Start: 2018-11-14 | End: 2018-12-27 | Stop reason: SDUPTHER

## 2018-11-14 ASSESSMENT — PATIENT HEALTH QUESTIONNAIRE - PHQ9
SUM OF ALL RESPONSES TO PHQ QUESTIONS 1-9: 0
SUM OF ALL RESPONSES TO PHQ9 QUESTIONS 1 & 2: 0
2. FEELING DOWN, DEPRESSED OR HOPELESS: 0
SUM OF ALL RESPONSES TO PHQ QUESTIONS 1-9: 0
1. LITTLE INTEREST OR PLEASURE IN DOING THINGS: 0

## 2018-11-14 ASSESSMENT — ENCOUNTER SYMPTOMS
RESPIRATORY NEGATIVE: 1
BACK PAIN: 1

## 2018-11-14 NOTE — LETTER
[facility]  [address] [place] [address]  Phone: [phone]  Fax: [phone]    Royal Cuevas DO        November 14, 2018     Patient: Qi Casillas   YOB: 1965   Date of Visit: 11/14/2018       To Whom it May Concern:       It is my medical opinion that Coni Shields requires a disability parking placard for the following reasons:  She has limited walking ability due to an arthritic condition.   Duration of need: permanent     If you have any questions or concerns, please don't hesitate to call.     Sincerely,            Royal Cuevas DO

## 2018-11-15 LAB
ESTIMATED AVERAGE GLUCOSE: 142.7 MG/DL
HBA1C MFR BLD: 6.6 %

## 2018-11-17 RX ORDER — LEVOTHYROXINE SODIUM 300 UG/1
300 TABLET ORAL DAILY
Qty: 30 TABLET | Refills: 3 | Status: SHIPPED | OUTPATIENT
Start: 2018-11-17 | End: 2019-03-07 | Stop reason: SDUPTHER

## 2018-11-17 RX ORDER — SIMVASTATIN 10 MG
10 TABLET ORAL NIGHTLY
Qty: 30 TABLET | Refills: 5 | Status: SHIPPED | OUTPATIENT
Start: 2018-11-17 | End: 2019-05-16 | Stop reason: SDUPTHER

## 2018-12-06 DIAGNOSIS — N39.3 STRESS INCONTINENCE: ICD-10-CM

## 2018-12-06 DIAGNOSIS — R60.1 GENERALIZED EDEMA: ICD-10-CM

## 2018-12-06 DIAGNOSIS — M25.552 PAIN OF BOTH HIP JOINTS: ICD-10-CM

## 2018-12-06 DIAGNOSIS — M25.551 PAIN OF BOTH HIP JOINTS: ICD-10-CM

## 2018-12-06 RX ORDER — TRIAMTERENE AND HYDROCHLOROTHIAZIDE 37.5; 25 MG/1; MG/1
CAPSULE ORAL
Qty: 30 CAPSULE | Refills: 0 | Status: SHIPPED | OUTPATIENT
Start: 2018-12-06 | End: 2019-01-02 | Stop reason: SDUPTHER

## 2018-12-06 RX ORDER — OXYBUTYNIN CHLORIDE 10 MG/1
TABLET, EXTENDED RELEASE ORAL
Qty: 30 TABLET | Refills: 0 | Status: SHIPPED | OUTPATIENT
Start: 2018-12-06 | End: 2019-01-02 | Stop reason: SDUPTHER

## 2018-12-06 RX ORDER — MELOXICAM 15 MG/1
TABLET ORAL
Qty: 30 TABLET | Refills: 4 | Status: SHIPPED | OUTPATIENT
Start: 2018-12-06 | End: 2019-05-16 | Stop reason: SDUPTHER

## 2018-12-13 ENCOUNTER — OFFICE VISIT (OUTPATIENT)
Dept: DERMATOLOGY | Age: 53
End: 2018-12-13
Payer: COMMERCIAL

## 2018-12-13 DIAGNOSIS — R21 RASH OF UNKNOWN CAUSE: Primary | ICD-10-CM

## 2018-12-13 DIAGNOSIS — F17.200 CURRENT EVERY DAY SMOKER: ICD-10-CM

## 2018-12-13 PROCEDURE — 11100 PR BIOPSY OF SKIN LESION: CPT | Performed by: DERMATOLOGY

## 2018-12-13 PROCEDURE — 99201 PR OFFICE OUTPATIENT NEW 10 MINUTES: CPT | Performed by: DERMATOLOGY

## 2018-12-13 NOTE — PROGRESS NOTES
[facility]) Dermatology  [address]       Abiel Gray  1965    52 y.o. female     Date of Visit: 2018    Chief Complaint:   Chief Complaint   Patient presents with    New Patient    Lesion(s)     both lower legs - several years - growing - not painfulnot tried any medications , pt states she also has a plaque on elbow and left knee        I was asked to see this patient by Dr. Trina Henry. History of Present Illness:  Abiel Gray is a 52 y.o. female who presents with the chief complaint of establish care and for the followin. Patient states she's had \"marks\" on her shins for 3-5 years at least.  She thinks they're slowly enlarging. Denies pain, pruritus, irritation, burning. Denies trying any topical medications previously to areas. negative RF, TYRELL, normal ESR. 18-negative SPEP. Saw rheumatologists, Dr. Brian Clarke, in May 2018 for arthralgias with sensory neuropathy. Possible seronegative rheumatoid arthritis. Patient was started on Plaquenil 200 mg b.i.d. by rheumatologist.    4/10/17-left medial lower leg-2 separate biopsies show crushed portion of inflamed skin. epidermis was unremarkable but dermis was particular difficult to evaluate due to crush artifact per pathology report     2. She has spots of psoriasis to her elbows, right knee, left thigh That feel rough and scaly. Denies family history of psoriasis. Denies pain, pruritus to spots. Denies bleeding to spots. No prior treatment. Spots have appeared over the last one year. States she has a history of arthralgias, rheumatologist suspects seronegative RA. Review of Systems:  Constitutional: Reports general sense of well-being   Skin: No new or changing moles, no history of keloids or hypertrophic scars. Heme: No abnormal bruising or bleeding. Past Medical History, Family History, Surgical History, Medications and Allergies reviewed.     Past Skin Hx:   Patient denies past history of

## 2018-12-19 LAB — DERMATOLOGY PATHOLOGY REPORT: NORMAL

## 2018-12-27 ENCOUNTER — OFFICE VISIT (OUTPATIENT)
Dept: FAMILY MEDICINE CLINIC | Age: 53
End: 2018-12-27
Payer: COMMERCIAL

## 2018-12-27 VITALS
BODY MASS INDEX: 35.46 KG/M2 | WEIGHT: 234 LBS | SYSTOLIC BLOOD PRESSURE: 138 MMHG | DIASTOLIC BLOOD PRESSURE: 86 MMHG | HEIGHT: 68 IN

## 2018-12-27 DIAGNOSIS — E66.01 CLASS 2 SEVERE OBESITY DUE TO EXCESS CALORIES WITH SERIOUS COMORBIDITY AND BODY MASS INDEX (BMI) OF 35.0 TO 35.9 IN ADULT (HCC): Primary | ICD-10-CM

## 2018-12-27 DIAGNOSIS — L92.1 NECROBIOSIS LIPOIDICA: ICD-10-CM

## 2018-12-27 PROCEDURE — S0630 REMOVAL OF SUTURES: HCPCS | Performed by: FAMILY MEDICINE

## 2018-12-27 PROCEDURE — 99213 OFFICE O/P EST LOW 20 MIN: CPT | Performed by: FAMILY MEDICINE

## 2018-12-27 RX ORDER — PHENTERMINE HYDROCHLORIDE 37.5 MG/1
37.5 TABLET ORAL
Qty: 30 TABLET | Refills: 0 | Status: SHIPPED | OUTPATIENT
Start: 2018-12-27 | End: 2019-01-28 | Stop reason: SDUPTHER

## 2018-12-27 ASSESSMENT — ENCOUNTER SYMPTOMS
RESPIRATORY NEGATIVE: 1
GASTROINTESTINAL NEGATIVE: 1

## 2018-12-27 NOTE — PROGRESS NOTES
Take 1 tablet by mouth every 6 hours as needed for Pain . 10 tablet 0    levothyroxine (SYNTHROID) 125 MCG tablet TAKE TWO TABLETS BY MOUTH DAILY 60 tablet 2       Vitals:    12/27/18 0922   BP: 138/86   Weight: 234 lb (106.1 kg)   Height: 5' 8\" (1.727 m)     Body mass index is 35.58 kg/m². Wt Readings from Last 3 Encounters:   12/27/18 234 lb (106.1 kg)   11/14/18 248 lb (112.5 kg)   07/13/18 257 lb (116.6 kg)     BP Readings from Last 3 Encounters:   12/27/18 138/86   11/14/18 138/86   07/13/18 (!) 160/97       Objective:   Physical Exam   Constitutional: She is oriented to person, place, and time. She appears well-developed and well-nourished. No distress. HENT:   Head: Normocephalic. Neurological: She is alert and oriented to person, place, and time. Skin:   Left ant shin with two simple interrupted sutures from skin biopsy  Well healed   Removed without difficulty     Psychiatric: She has a normal mood and affect. Her behavior is normal. Judgment and thought content normal.       Assessment:      Assessment/plan;  Bessy Moura was seen today for medication refill and suture / staple removal.    Diagnoses and all orders for this visit:    Class 2 severe obesity due to excess calories with serious comorbidity and body mass index (BMI) of 35.0 to 35.9 in [facility])  Cont meds  Diet and exercise  Recheck in one month  Necrobiosis lipoidica  Given pt info   Will see derm next week for treatment options   Other orders  -     phentermine (ADIPEX-P) 37.5 MG tablet; Take 1 tablet by mouth every morning (before breakfast) for 30 days. .      No Follow-up on file.     Jac Loyd, DO

## 2019-01-02 DIAGNOSIS — R60.1 GENERALIZED EDEMA: ICD-10-CM

## 2019-01-02 DIAGNOSIS — N39.3 STRESS INCONTINENCE: ICD-10-CM

## 2019-01-02 RX ORDER — OXYBUTYNIN CHLORIDE 10 MG/1
TABLET, EXTENDED RELEASE ORAL
Qty: 30 TABLET | Refills: 0 | Status: SHIPPED | OUTPATIENT
Start: 2019-01-02 | End: 2019-02-06 | Stop reason: SDUPTHER

## 2019-01-02 RX ORDER — TRIAMTERENE AND HYDROCHLOROTHIAZIDE 37.5; 25 MG/1; MG/1
CAPSULE ORAL
Qty: 30 CAPSULE | Refills: 0 | Status: SHIPPED | OUTPATIENT
Start: 2019-01-02 | End: 2019-02-06 | Stop reason: SDUPTHER

## 2019-01-17 ENCOUNTER — OFFICE VISIT (OUTPATIENT)
Dept: DERMATOLOGY | Age: 54
End: 2019-01-17
Payer: MEDICARE

## 2019-01-17 DIAGNOSIS — B07.9 VIRAL WARTS, UNSPECIFIED TYPE: ICD-10-CM

## 2019-01-17 DIAGNOSIS — L92.1 NECROBIOSIS LIPOIDICA: Primary | ICD-10-CM

## 2019-01-17 DIAGNOSIS — R23.8 EXCORIATED PAPULE: ICD-10-CM

## 2019-01-17 DIAGNOSIS — L82.1 SEBORRHEIC KERATOSIS: ICD-10-CM

## 2019-01-17 PROCEDURE — 11901 INJECT SKIN LESIONS >7: CPT | Performed by: DERMATOLOGY

## 2019-01-17 PROCEDURE — 99212 OFFICE O/P EST SF 10 MIN: CPT | Performed by: DERMATOLOGY

## 2019-01-28 ENCOUNTER — OFFICE VISIT (OUTPATIENT)
Dept: FAMILY MEDICINE CLINIC | Age: 54
End: 2019-01-28
Payer: MEDICARE

## 2019-01-28 VITALS
DIASTOLIC BLOOD PRESSURE: 84 MMHG | HEIGHT: 68 IN | SYSTOLIC BLOOD PRESSURE: 132 MMHG | BODY MASS INDEX: 34.71 KG/M2 | WEIGHT: 229 LBS

## 2019-01-28 DIAGNOSIS — E66.01 CLASS 2 SEVERE OBESITY DUE TO EXCESS CALORIES WITH SERIOUS COMORBIDITY AND BODY MASS INDEX (BMI) OF 35.0 TO 35.9 IN ADULT (HCC): ICD-10-CM

## 2019-01-28 PROCEDURE — 99213 OFFICE O/P EST LOW 20 MIN: CPT | Performed by: FAMILY MEDICINE

## 2019-01-28 RX ORDER — PHENTERMINE HYDROCHLORIDE 37.5 MG/1
37.5 TABLET ORAL
Qty: 30 TABLET | Refills: 0 | Status: SHIPPED | OUTPATIENT
Start: 2019-01-28 | End: 2019-02-27

## 2019-01-28 ASSESSMENT — ENCOUNTER SYMPTOMS
RESPIRATORY NEGATIVE: 1
GASTROINTESTINAL NEGATIVE: 1

## 2019-02-14 ENCOUNTER — OFFICE VISIT (OUTPATIENT)
Dept: DERMATOLOGY | Age: 54
End: 2019-02-14
Payer: MEDICARE

## 2019-02-14 DIAGNOSIS — L98.491 SKIN ULCER, LIMITED TO BREAKDOWN OF SKIN (HCC): ICD-10-CM

## 2019-02-14 DIAGNOSIS — L92.1 NECROBIOSIS LIPOIDICA: Primary | ICD-10-CM

## 2019-02-14 DIAGNOSIS — L81.9 HYPERPIGMENTATION: ICD-10-CM

## 2019-02-14 DIAGNOSIS — L82.0 INFLAMED SEBORRHEIC KERATOSIS: ICD-10-CM

## 2019-02-14 PROCEDURE — 99212 OFFICE O/P EST SF 10 MIN: CPT | Performed by: DERMATOLOGY

## 2019-02-14 PROCEDURE — 17110 DESTRUCTION B9 LES UP TO 14: CPT | Performed by: DERMATOLOGY

## 2019-02-14 PROCEDURE — 11901 INJECT SKIN LESIONS >7: CPT | Performed by: DERMATOLOGY

## 2019-02-14 RX ORDER — HYDROQUINONE 40 MG/G
CREAM TOPICAL
Qty: 56.8 G | Refills: 1 | Status: SHIPPED | OUTPATIENT
Start: 2019-02-14 | End: 2019-07-18 | Stop reason: SINTOL

## 2019-03-07 DIAGNOSIS — R21 RASH OF UNKNOWN CAUSE: ICD-10-CM

## 2019-03-07 DIAGNOSIS — L98.491 SKIN ULCER, LIMITED TO BREAKDOWN OF SKIN (HCC): ICD-10-CM

## 2019-03-07 RX ORDER — LEVOTHYROXINE SODIUM 300 UG/1
TABLET ORAL
Qty: 30 TABLET | Refills: 2 | Status: SHIPPED | OUTPATIENT
Start: 2019-03-07 | End: 2019-06-17 | Stop reason: SDUPTHER

## 2019-04-24 ENCOUNTER — TELEPHONE (OUTPATIENT)
Dept: FAMILY MEDICINE CLINIC | Age: 54
End: 2019-04-24

## 2019-04-25 ENCOUNTER — OFFICE VISIT (OUTPATIENT)
Dept: RHEUMATOLOGY | Age: 54
End: 2019-04-25
Payer: MEDICARE

## 2019-04-25 VITALS
HEIGHT: 68 IN | DIASTOLIC BLOOD PRESSURE: 97 MMHG | WEIGHT: 231 LBS | BODY MASS INDEX: 35.01 KG/M2 | SYSTOLIC BLOOD PRESSURE: 156 MMHG | HEART RATE: 81 BPM

## 2019-04-25 DIAGNOSIS — M65.312 TRIGGER FINGER OF LEFT THUMB: ICD-10-CM

## 2019-04-25 DIAGNOSIS — M19.90 INFLAMMATORY ARTHRITIS: Primary | ICD-10-CM

## 2019-04-25 DIAGNOSIS — M15.9 PRIMARY OSTEOARTHRITIS INVOLVING MULTIPLE JOINTS: ICD-10-CM

## 2019-04-25 PROCEDURE — 20550 NJX 1 TENDON SHEATH/LIGAMENT: CPT | Performed by: INTERNAL MEDICINE

## 2019-04-25 PROCEDURE — 99214 OFFICE O/P EST MOD 30 MIN: CPT | Performed by: INTERNAL MEDICINE

## 2019-04-25 RX ORDER — TRIAMCINOLONE ACETONIDE 40 MG/ML
20 INJECTION, SUSPENSION INTRA-ARTICULAR; INTRAMUSCULAR ONCE
Status: COMPLETED | OUTPATIENT
Start: 2019-04-25 | End: 2019-04-25

## 2019-04-25 RX ORDER — HYDROXYCHLOROQUINE SULFATE 200 MG/1
200 TABLET, FILM COATED ORAL 2 TIMES DAILY
Qty: 60 TABLET | Refills: 5 | Status: SHIPPED | OUTPATIENT
Start: 2019-04-25 | End: 2019-10-10 | Stop reason: SDUPTHER

## 2019-04-25 RX ORDER — LIDOCAINE HYDROCHLORIDE 10 MG/ML
0.5 INJECTION, SOLUTION INFILTRATION; PERINEURAL ONCE
Status: COMPLETED | OUTPATIENT
Start: 2019-04-25 | End: 2019-04-25

## 2019-04-25 RX ADMIN — LIDOCAINE HYDROCHLORIDE 0.5 ML: 10 INJECTION, SOLUTION INFILTRATION; PERINEURAL at 12:54

## 2019-04-25 RX ADMIN — TRIAMCINOLONE ACETONIDE 20 MG: 40 INJECTION, SUSPENSION INTRA-ARTICULAR; INTRAMUSCULAR at 12:54

## 2019-04-25 NOTE — PROGRESS NOTES
2019  Patient Name: Marija Parrish  : 1965  Medical Record: [id number]    MEDICATIONS  Current Outpatient Medications   Medication Sig Dispense Refill    PHENTERMINE HCL PO Take by mouth      hydroxychloroquine (PLAQUENIL) 200 MG tablet Take 1 tablet by mouth 2 times daily 60 tablet 5    betamethasone valerate (VALISONE) 0.1 % cream APPLY A THIN LAYER TO RAISED MARGINS OF AFFECTED AREAS ON LOWER LEGS 1-2 times per day for 2 weeks or until flattens 45 g 2    mupirocin (BACTROBAN) 2 % ointment APPLY TOPICALLY TO HEALING AREA ON LEFT SHIN TWO TIMES A DAY FOR 2 WEEKS OR UNTIL HEALED 22 g 0    levothyroxine (SYNTHROID) 300 MCG tablet TAKE ONE TABLET BY MOUTH DAILY 30 tablet 2    hydroquinone 4 % cream Apply thin layer nightly for 2 months, then discontinue for 1 month before restarting cycle 56.8 g 1    triamterene-hydrochlorothiazide (DYAZIDE) 37.5-25 MG per capsule TAKE ONE CAPSULE BY MOUTH DAILY 30 capsule 2    oxybutynin (DITROPAN-XL) 10 MG extended release tablet TAKE ONE TABLET BY MOUTH DAILY 30 tablet 2    meloxicam (MOBIC) 15 MG tablet TAKE ONE TABLET BY MOUTH DAILY 30 tablet 4    simvastatin (ZOCOR) 10 MG tablet Take 1 tablet by mouth nightly 30 tablet 5    Probiotic Product (PROBIOTIC DAILY PO) Take by mouth      Prenatal Vit-Fe Fumarate-FA (PRENATAL/FOLIC ACID) TABS Take by mouth      HYDROcodone-acetaminophen (NORCO) 5-325 MG per tablet Take 1 tablet by mouth every 6 hours as needed for Pain .  10 tablet 0     Current Facility-Administered Medications   Medication Dose Route Frequency Provider Last Rate Last Dose    triamcinolone acetonide (KENALOG-40) injection 20 mg  20 mg Intra-articular Once Panda Haynes MD        lidocaine 1 % injection 0.5 mL  0.5 mL Intra-articular Once Panda Haynes MD           ALLERGIES  Allergies   Allergen Reactions    Sulfa Antibiotics Swelling and Other (See Comments)     Pt states gets really hot like she is on fire,and swelling of face,hands, and feet    Erythromycin Swelling         Comments  No specialty comments available. Background history:  Fang Bolton is a 52 y.o. female who is being seen for follow up evaluation of  joint pain. She is complaining of pain in the joints which started 2 weeks ago. She has pain in various joints but is worse in the hips, back, knee, shoulders, hands and wrists. She describes her pain as constant, 2 out of 10, aching associated with swelling and stiffness. She tried meloxicam with minimal relief. She was placed on steroids for 1 week pain initially improved then started returning. She has a morning stiffness lasting for 2-3 hours. Father has rheumatoid arthritis.  -She also has a rash on both shins for past 2 years. The rash is getting worse. She saw the dermatologist and was not given any diagnosis. She had a biopsy by PCP which showed crushed inflammatory epidermis and showed possible dermal hypersensitivity reaction to a drug or other systemic agent. Rash is associated with itching and is scaly. She denies history of psoriasis, inflammatory bowel disease, inflammatory back pain, uveitis, enthesitis, tenosynovitis. There is no history of photosensitivity, malar rash, pleurisy or pericarditis, blood clots, miscarriages, raynaud's. She is complaining of tingling and numbness in both feet. She denies any weakness, wrist or foot drop  -workup shows negative RF, TYRELL, normal ESR. Interim history: She presents for follow-up of inflammatory arthritis and osteoarthritis. She was last seen 10 months ago. She was on Plaquenil 200 mg twice a day with significant improvement in joint pain and swelling. She is off of Plaquenil for past few months and pain in the her joints has returned especially in her hands associated with swelling and stiffness. She also gets on and off pain in her knees associated with cracks and pops. She is also on meloxicam 15 mg daily which does not help much.   She is complaining of trigger thumb involving the left side. Blood work shows negative RF, CCP, TYRELL. HPI  ROS    REVIEW OF SYSTEMS: positive for fatigue, recent weight gain of 60 pounds, ringing in ears, dry mouth, swollen legs, easy bruising, alopecia, night sweats, anxiety and depression, sleep disturbance  Constitutional: No unanticipated weight loss or fevers. Integumentary: No photosensitivity, malar rash, livedo reticularis, and Raynaud's symptoms, sclerodactyly, skin tightening  Eyes: negative for dry eyes, visual disturbance and persistent redness, discharge from eyes   ENT: - No loss of hearing, vertigo, or recurrent ear infections.  - No history of nasal/oral ulcers. Cardiovascular: No history of pericarditis, chest pain or murmur or palpitations  Respiratory: No shortness of breath, cough or history of interstitial lung disease. No history of pleurisy. No history of tuberculosis or atypical infections. Gastrointestinal: No history of heart burn, dysphagia or esophageal dysmotility. No inflammatory bowel disease. Genitourinary: No history renal disease, miscarriages. Hematologic/Lymphatic: No bleeding, blood clots or swollen lymph nodes. Neurological: No history seizure or focal weakness. No history of neuropathies, hyperesthesias, facial droop, diplopia  Psychiatric: No history of bipolar disease  Endocrine: Denies any parathyroid disease and osteoporosis  Allergic/Immunologic: No nasal congestion or hives. I have reviewed patients Past medical History, Social History and Family History as mentioned in her chart and this remains unchanged from previous.     Past Medical History:   Diagnosis Date    Anesthesia complication     severe headache after woke up after wisdom teeth    Chronic back pain     Condyloma acuminatum     Headache(784.0)     Hypertension     Hypothyroidism     IBS (irritable bowel syndrome)     Metrorrhagia     Neuropathy     Osteoarthritis     Panic attack      Past Surgical History: PLAN      Assessment/Plan:      ASSESSMENT:    1. Inflammatory arthritis    2. Primary osteoarthritis involving multiple joints    3. Trigger finger of left thumb        PLAN:   1. Inflammatory arthritis  Most likely possibility seronegative rheumatoid arthritis  -Active synovitis on joint exam  -RF, CCP negative, no erosions on x-rays  -Resume Plaquenil 200 mg twice a day  - Comprehensive Metabolic Panel; Future  - hydroxychloroquine (PLAQUENIL) 200 MG tablet; Take 1 tablet by mouth 2 times daily  Dispense: 60 tablet; Refill: 5    2. Primary osteoarthritis involving multiple joints  Continue meloxicam 15 mg daily    3. Trigger finger of left thumb  I will do corticosteroid injection  - AL INJECT TENDON SHEATH/LIGAMENT  - triamcinolone acetonide (KENALOG-40) injection 20 mg  - lidocaine 1 % injection 0.5 mL    PROCEDURE NOTE    JOINT ASPIRATION/INJECTION  Left thumb trigger finger: The patient was informed about the risk and benefits of the procedure, including the risk of infection, hemorrhage and skin hypopigmentation from the corticosteroids. After informed consent was obtained, using sterile technique, the thumb area was prepped and chlorhexidine was used as local anesthetic. The joint was entered and Kenalog 20 mg and 0.5 ml plain Lidocaine was then injected and the needle withdrawn. The procedure was well tolerated. No immediate complication noticed. The patient is asked to continue to rest the joint for a few more days before resuming regular activities. Advised patient to watch for fever, increased swelling or persistent pain in the joint. Call or return to clinic prn if such symptoms occur or there is failure to improve as anticipated. The patient indicates understanding of these issues and agrees with the plan. Return in about 6 months (around 10/25/2019).       The risks and benefits of my recommendations, as well as other treatment options, benefits and side effects were discussed with the patient. All questions were answered. ######################################################################    I thank you for giving me the opportunity to participate in [address]. If you have any questions or concerns please feel free to contact me. I look forward to following  [facility] along with you. Electronically signed by: Jane Petty MD, 4/25/2019 12:45 PM    Documentation was done using voice recognition dragon software. Every effort was made to ensure accuracy; however, inadvertent unintentional computerized transcription errors may be present.

## 2019-05-16 DIAGNOSIS — N39.3 STRESS INCONTINENCE: ICD-10-CM

## 2019-05-16 DIAGNOSIS — M25.552 PAIN OF BOTH HIP JOINTS: ICD-10-CM

## 2019-05-16 DIAGNOSIS — M25.551 PAIN OF BOTH HIP JOINTS: ICD-10-CM

## 2019-05-16 DIAGNOSIS — R60.1 GENERALIZED EDEMA: ICD-10-CM

## 2019-05-16 RX ORDER — OXYBUTYNIN CHLORIDE 10 MG/1
TABLET, EXTENDED RELEASE ORAL
Qty: 30 TABLET | Refills: 1 | Status: SHIPPED | OUTPATIENT
Start: 2019-05-16 | End: 2019-07-12 | Stop reason: SDUPTHER

## 2019-05-16 RX ORDER — TRIAMTERENE AND HYDROCHLOROTHIAZIDE 37.5; 25 MG/1; MG/1
CAPSULE ORAL
Qty: 30 CAPSULE | Refills: 1 | Status: SHIPPED | OUTPATIENT
Start: 2019-05-16 | End: 2019-07-12 | Stop reason: SDUPTHER

## 2019-05-16 RX ORDER — SIMVASTATIN 10 MG
TABLET ORAL
Qty: 30 TABLET | Refills: 4 | Status: SHIPPED | OUTPATIENT
Start: 2019-05-16 | End: 2019-08-08

## 2019-05-16 RX ORDER — MELOXICAM 15 MG/1
TABLET ORAL
Qty: 30 TABLET | Refills: 3 | Status: SHIPPED | OUTPATIENT
Start: 2019-05-16 | End: 2019-09-10 | Stop reason: SDUPTHER

## 2019-06-13 ENCOUNTER — OFFICE VISIT (OUTPATIENT)
Dept: DERMATOLOGY | Age: 54
End: 2019-06-13
Payer: MEDICARE

## 2019-06-13 DIAGNOSIS — H02.833 DERMATOCHALASIS OF EYELIDS OF BOTH EYES, UNSPECIFIED EYELID: ICD-10-CM

## 2019-06-13 DIAGNOSIS — B07.9 VIRAL WARTS, UNSPECIFIED TYPE: ICD-10-CM

## 2019-06-13 DIAGNOSIS — L92.1 NECROBIOSIS LIPOIDICA: ICD-10-CM

## 2019-06-13 DIAGNOSIS — L82.0 SEBORRHEIC KERATOSES, INFLAMED: Primary | ICD-10-CM

## 2019-06-13 DIAGNOSIS — H02.836 DERMATOCHALASIS OF EYELIDS OF BOTH EYES, UNSPECIFIED EYELID: ICD-10-CM

## 2019-06-13 PROCEDURE — 11900 INJECT SKIN LESIONS </W 7: CPT | Performed by: DERMATOLOGY

## 2019-06-13 PROCEDURE — 17110 DESTRUCTION B9 LES UP TO 14: CPT | Performed by: DERMATOLOGY

## 2019-06-13 PROCEDURE — 99213 OFFICE O/P EST LOW 20 MIN: CPT | Performed by: DERMATOLOGY

## 2019-06-13 RX ORDER — GLUCOSAMINE/D3/BOSWELLIA SERRA 1500MG-400
TABLET ORAL
COMMUNITY

## 2019-06-13 NOTE — PROGRESS NOTES
Systems:  Constitutional: Reports general sense of well-being   Skin: No new or changing moles, no history of keloids or hypertrophic scars, no interval of severe sunburns  Heme: No abnormal bruising or bleeding. Past Medical History, Family History, Surgical History, Medications and Allergies reviewed. Past Skin Hx:  12/13/18- necrobiosis lipoidica- betamethasone 0.05% ointment to periphery of active lesions. s/p ILK 5mg/mL injections to active lesions only  1/17/19 and 2/14/19- S/p two ILK 0.5mg/ml to 8 palpable lesions   4/10/17-left medial lower leg-2 separate biopsies show crushed portion of inflamed skin.  epidermis was unremarkable but dermis was particular difficult to evaluate due to crush artifact per pathology report      PMHx: borderline diabetes per patient, seronegative rheumatoid arthritis.     PFHx: Denies hx of MM or NMSC, father hx of rheumatoid arthritis        Family History   Problem Relation Age of Onset    Diabetes Other     High Blood Pressure Other     Stroke Other     Cancer Other         Bone    Alcohol Abuse Other     Arthritis Father     Heart Disease Father         cabg    Diabetes Father     Stroke Father     Cancer Sister         breast cancer    Cancer Brother         lung     Past Medical History:   Diagnosis Date    Anesthesia complication     severe headache after woke up after wisdom teeth    Chronic back pain     Condyloma acuminatum     Headache(784.0)     Hypertension     Hypothyroidism     IBS (irritable bowel syndrome)     Metrorrhagia     Neuropathy     Osteoarthritis     Panic attack      Past Surgical History:   Procedure Laterality Date    TOTAL HIP ARTHROPLASTY Left 08/16/2016    WISDOM TOOTH EXTRACTION         Allergies   Allergen Reactions    Glucosamine Chondroitin Complx [Glucosamine-Chondroitin] Swelling     \"Due to sulfa allergy\" per pt    Sulfa Antibiotics Swelling and Other (See Comments)     Pt states gets really hot like she is on fire,and swelling of face,hands, and feet    Erythromycin Swelling     Outpatient Medications Marked as Taking for the 6/13/19 encounter (Office Visit) with Sangita Mirta, DO   Medication Sig Dispense Refill    Biotin 32020 MCG TABS Take by mouth      meloxicam (MOBIC) 15 MG tablet TAKE ONE TABLET BY MOUTH DAILY 30 tablet 3    simvastatin (ZOCOR) 10 MG tablet TAKE ONE TABLET BY MOUTH ONCE NIGHTLY 30 tablet 4    oxybutynin (DITROPAN-XL) 10 MG extended release tablet TAKE ONE TABLET BY MOUTH DAILY 30 tablet 1    triamterene-hydrochlorothiazide (DYAZIDE) 37.5-25 MG per capsule TAKE ONE CAPSULE BY MOUTH DAILY 30 capsule 1    hydroxychloroquine (PLAQUENIL) 200 MG tablet Take 1 tablet by mouth 2 times daily 60 tablet 5    betamethasone valerate (VALISONE) 0.1 % cream APPLY A THIN LAYER TO RAISED MARGINS OF AFFECTED AREAS ON LOWER LEGS 1-2 times per day for 2 weeks or until flattens 45 g 2    levothyroxine (SYNTHROID) 300 MCG tablet TAKE ONE TABLET BY MOUTH DAILY 30 tablet 2       Social History:   Social History     Socioeconomic History    Marital status:      Spouse name: Not on file    Number of children: 2    Years of education: Not on file    Highest education level: Not on file   Occupational History    Occupation:    Social Needs    Financial resource strain: Not on file    Food insecurity:     Worry: Not on file     Inability: Not on file    Transportation needs:     Medical: Not on file     Non-medical: Not on file   Tobacco Use    Smoking status: Current Every Day Smoker     Packs/day: 1.00     Years: 30.00     Pack years: 30.00     Types: Cigarettes    Smokeless tobacco: Never Used    Tobacco comment: encouraged to stop smoking    Substance and Sexual Activity    Alcohol use:  Yes     Alcohol/week: 0.0 oz     Comment: rare    Drug use: No    Sexual activity: Not Currently   Lifestyle    Physical activity:     Days per week: Not on file     Minutes per session: Not on file    Stress: Not on file   Relationships    Social connections:     Talks on phone: Not on file     Gets together: Not on file     Attends Rastafarian service: Not on file     Active member of club or organization: Not on file     Attends meetings of clubs or organizations: Not on file     Relationship status: Not on file    Intimate partner violence:     Fear of current or ex partner: Not on file     Emotionally abused: Not on file     Physically abused: Not on file     Forced sexual activity: Not on file   Other Topics Concern    Not on file   Social History Narrative    Not on file       Physical Examination     The following were examined and determined to be normal: Psych/Neuro. The following were examined and determined to be abnormal: Head/face, Conjunctivae/eyelids, Back, RLE, LLE and Nails/digits. -General: NAD, well-nourished, well-developed. Areas of skin examined as listed above:  1. stuck-on appearing tan-brown verrucous papules located to left knee and left popliteal fossa and right superior back with mild erythema, directly inferior to right eyebrow  2. Pink cauliflower-like verrucous papules with thrombosed capillary loops located to bilateral volar pads of thumbs  3. Upper and lower eyelids-excess skin laxity  4. One mildly erythematous papule at lateral peripheray of NLD on right pretibial lower extremity, bilateral lower legs- 2 irregularly bordered large atrophic plaques with central yellowish-reddish-brown discoloration  Assessment and Plan     1. Seborrheic keratoses, inflamed    2. Viral warts, unspecified type    3. Necrobiosis lipoidica    4. Dermatochalasis of eyelids of both eyes, unspecified eyelid        1. Seborrheic keratoses, inflamed  Patient educated that seborrheic keratoses have no malignant potential, due to localized irritation/discomfort, pt elects for cryotherapy treatment today:   -4 lesion(s) treated w/ liquid nitrogen x 1cycles - 3 seconds each.  Edu re: risk of blister formation, discomfort, scar, hypopigmentation. Discussed wound care and instructions given to take home. - ND DESTRUCTION BENIGN LESIONS UP TO 14    2. Viral warts, unspecified type  Verruca vulgaris  Verbal consent obtained  -2 lesion(s) treated w/ liquid nitrogen x 2 cycles - 3 seconds each. Edu re: risk of blister formation, discomfort, scar, hypopigmentation. Discussed wound care.   -Edu re: viral etiology of lesions, possible development of new lesions  -Also discussed potential need for re-treatment for residual/recurrent verruca    - ND DESTRUCTION BENIGN LESIONS UP TO 14    3. Necrobiosis lipoidica  Stable- majority of lesions are flattened and showing post-inflammatory hyperpigmentation  --Counseled patient that the discoloration and atrophy may persist but goal of treatment is to \"burnout\" the peripheral borders of the active plaques and prevent ulcerations from forming  -may use betamethasone 0.05% ointment BID sparingly PRN flares if new palpable lesion appears. Reviewed proper use and side effects/contraindications    Risks v. Benefits discussed of ILK injections  Verbal consent obtained  -IL kenalog 5 mg/ml; total 0.1 ml to 1 palpable  lesion(s) at the following body locations:bilateral lower extremities below the knees. Edu re: atrophy, dyspigmentation. Encouraged ulcer prevention with elevating legs during rest, wearing compression stockings (patient declined Rx strength today), and glucose control .       RTC PRN    4. Dermatochalasis of eyelids of both eyes, unspecified eyelid  Name and contact info of oculoplastic surgeon, Dr. Reyes Rosenthal, given to patient, she will contact as needed for consultation. Return to Clinic: 4-6 weeks for warts/ISKs  Discussed plan with patient and/or primary caretaker. Patient to call clinic with any questions / concerns. Reviewed side effects of treatment(s) and/or medication(s) with patient and/or primary caretaker.    AVS provided or is available on [facility]     Note is transcribed using voice recognition software. Inadvertent computerized transcription errors may be present. Return for 4-6 wks for cryo.

## 2019-06-13 NOTE — PATIENT INSTRUCTIONS
For your well being, we encourage you to stop smoking or using tobacco products. Smoking and the use of other tobacco products, including cigars and smokeless tobacco, causes or worsens numerous diseases and conditions. Some products also expose nearby people to toxic secondhand smoke. Smoking is the leading cause of preventable death in the [place] causing over 438,000 deaths per year. Secondhand smoke is a serious health hazard for people of all ages, causing more than 41,000 deaths each year. Marijuana smoke contains many of the same toxins, irritants and carcinogens as tobacco smoke. Electronic cigarettes are a new tobacco product, and the potential health consequences and safety of these products are unknown. Smokeless Tobacco products are a known cause of cancer, and are not a safe alternative to cigarettes. 1. Make the decision to quit smoking  Stopping smoking is the best thing you can do for your health. If you smoke, you are more likely to get diseases of the lungs, heart, and brain. You are also more likely to get many kinds of cancer. After you quit, you will be less likely to get these diseases. Stopping smoking is hard--but you can do it! Your doctor can help you. 2. Get ready to quit  Once you decide to quit, make a plan with the help of your doctor. Here are some things you need to do:  Choose a day to quit. Talk to your primary care doctor about using the nicotine patch, gum, inhaler, or nasal spray to help you quit smoking. Getting nicotine some way other than in a cigarette can help make quitting easier. Talk to your primary care doctor about using a prescription medicine like bupropion (brand name: Zyban) to reduce your urge to smoke. If you decide to use a medicine, start taking it two weeks before your quit day. Talk with your primary care doctor about when you smoke. For example, you may smoke first thing in the morning, after a meal, or when you feel stressed.  Plan what you will do instead of smoking. Tell your family and friends that you are going to try to quit and on what date. Put together a list of phone numbers of friends and family members who can give you support when you feel you might break down and have a cigarette. Before your quit day, put all tobacco products, ashtrays, and lighters away. 3. Put your plan into action  When you wake up on your quit day, start using a nicotine replacement method if you had planned to do that. For the first few days after you quit, you may have some signs of nicotine withdrawal. You may feel restless and cranky. You may find it hard to think. You might need to change your dose of nicotine if these signs upset you. Do not smoke! If you feel like you want to smoke, call a friend or family member who has agreed to help you. Also, there might be someone in your doctor's office you can call. Put into action your plans for doing things other than smoking. For example, when you feel the urge to smoke, you might take a walk. Or, you might visit friends who do not smoke. It is best to stay away from places where you used to smoke. 4. If you return to smoking--  Quitting smoking is not easy. Many people have to try several times before they succeed. If you start smoking again, call your primary care doctor's office soon to talk about what happened. Think about what you can do to keep from smoking when you try to quit again. Part of this handout is provided by the [employer]. More information is available at the American [employer] Association https://garcia.com/.  This information provides a general overview and may not apply to everyone. Talk to your primary care doctor to find out if this information applies to you and to get more information on this subject.       Cryosurgery (Freezing) Wound Care Instructions    AFTER THE PROCEDURE:    You will notice swelling and redness around the site. This is normal.    You may experience a sharp or sore feeling for the next several days. For this discomfort, you may take acetaminophen (Tylenol©).  A blister may develop at the treated area, sometimes as soon as by the end of the day. After several days, the blister will subside and a scab will form.  If the area is bumped or traumatized during the first few days following freezing, you may develop bleeding into the blister, forming a blood blister. This is nothing to be alarmed about.  If the blister is tense, uncomfortable, or much larger than the site that was frozen, you may pop the blister along its edge with a sterile needle (boiled, heated under a flame, or cleaned with alcohol) to allow the fluid to drain out. If the blister does not bother you, no treatment is needed.  Do NOT peel off the top of the blister roof. It will act as a dressing on top of your wound. WOUND CARE:    You may shower or bathe as usual, but avoid scrubbing the areas that have been frozen.  Cleanse the site twice a day with mild soapy water, and then apply a thin film of white petrolatum (Vaseline©).  You do not need to cover the area, but can if you prefer.  Do NOT allow the site to become dry or crusted, or attempt to dry it out with rubbing alcohol or hydrogen peroxide.  Continue this regimen until the area is pink and healed. Depending on the size and location of your cryosurgery site, healing may take 2 to 4 weeks.  The area may continue to be pink for several weeks, and over the next few months may become darker or lighter than the surrounding skin. This may be a permanent change.      Sandra Yoder  [phone]

## 2019-06-17 RX ORDER — LEVOTHYROXINE SODIUM 300 UG/1
TABLET ORAL
Qty: 30 TABLET | Refills: 1 | Status: SHIPPED | OUTPATIENT
Start: 2019-06-17 | End: 2019-08-11 | Stop reason: SDUPTHER

## 2019-07-12 DIAGNOSIS — N39.3 STRESS INCONTINENCE: ICD-10-CM

## 2019-07-12 DIAGNOSIS — R60.1 GENERALIZED EDEMA: ICD-10-CM

## 2019-07-12 RX ORDER — OXYBUTYNIN CHLORIDE 10 MG/1
TABLET, EXTENDED RELEASE ORAL
Qty: 30 TABLET | Refills: 0 | Status: SHIPPED | OUTPATIENT
Start: 2019-07-12 | End: 2019-08-11 | Stop reason: SDUPTHER

## 2019-07-12 RX ORDER — TRIAMTERENE AND HYDROCHLOROTHIAZIDE 37.5; 25 MG/1; MG/1
CAPSULE ORAL
Qty: 30 CAPSULE | Refills: 0 | Status: SHIPPED | OUTPATIENT
Start: 2019-07-12 | End: 2019-08-11 | Stop reason: SDUPTHER

## 2019-07-18 ENCOUNTER — OFFICE VISIT (OUTPATIENT)
Dept: DERMATOLOGY | Age: 54
End: 2019-07-18
Payer: MEDICARE

## 2019-07-18 DIAGNOSIS — L82.0 SEBORRHEIC KERATOSES, INFLAMED: ICD-10-CM

## 2019-07-18 DIAGNOSIS — B07.8 OTHER VIRAL WARTS: Primary | ICD-10-CM

## 2019-07-18 PROCEDURE — 11302 SHAVE SKIN LESION 1.1-2.0 CM: CPT | Performed by: DERMATOLOGY

## 2019-07-18 PROCEDURE — 11301 SHAVE SKIN LESION 0.6-1.0 CM: CPT | Performed by: DERMATOLOGY

## 2019-07-18 PROCEDURE — 17110 DESTRUCTION B9 LES UP TO 14: CPT | Performed by: DERMATOLOGY

## 2019-07-18 NOTE — PROGRESS NOTES
to evaluate due to crush artifact per pathology report      PMHx: borderline diabetes per patient, seronegative rheumatoid arthritis.     PFHx: Denies hx of MM or NMSC, father hx of rheumatoid arthritis    Family History   Problem Relation Age of Onset    Diabetes Other     High Blood Pressure Other     Stroke Other     Cancer Other         Bone    Alcohol Abuse Other     Arthritis Father     Heart Disease Father         cabg    Diabetes Father     Stroke Father     Cancer Sister         breast cancer    Cancer Brother         lung     Past Medical History:   Diagnosis Date    Anesthesia complication     severe headache after woke up after wisdom teeth    Chronic back pain     Condyloma acuminatum     Headache(784.0)     Hypertension     Hypothyroidism     IBS (irritable bowel syndrome)     Metrorrhagia     Neuropathy     Osteoarthritis     Panic attack      Past Surgical History:   Procedure Laterality Date    TOTAL HIP ARTHROPLASTY Left 08/16/2016    WISDOM TOOTH EXTRACTION         Allergies   Allergen Reactions    Glucosamine Chondroitin Complx [Glucosamine-Chondroitin] Swelling     \"Due to sulfa allergy\" per pt    Sulfa Antibiotics Swelling and Other (See Comments)     Pt states gets really hot like she is on fire,and swelling of face,hands, and feet    Erythromycin Swelling     Outpatient Medications Marked as Taking for the 7/18/19 encounter (Office Visit) with Daniel Rojo, DO   Medication Sig Dispense Refill    oxybutynin (DITROPAN-XL) 10 MG extended release tablet TAKE ONE TABLET BY MOUTH DAILY 30 tablet 0    triamterene-hydrochlorothiazide (DYAZIDE) 37.5-25 MG per capsule TAKE ONE CAPSULE BY MOUTH DAILY 30 capsule 0    levothyroxine (SYNTHROID) 300 MCG tablet TAKE ONE TABLET BY MOUTH DAILY 30 tablet 1    Biotin 79051 MCG TABS Take by mouth      meloxicam (MOBIC) 15 MG tablet TAKE ONE TABLET BY MOUTH DAILY 30 tablet 3    simvastatin (ZOCOR) 10 MG tablet TAKE ONE TABLET scar, hypopigmentation. Discussed wound care.   -Edu re: viral etiology of lesions, possible development of new lesions  -Also discussed potential need for re-treatment for residual/recurrent verruca  -Instructed to obtain OTC topical salicylic acid solution or compound W and apply qHS under occlusion. Remove occlusion in QAM, wash solution off, and pare down with callus file. Patient informed to call the office if the warts spread or recur despite treatment. 2. Inflamed seborrheic keratosis  Patient educated that seborrheic keratoses have no malignant potential.    right superior back   1 lesion(s) treated w/ 1 cycle(s) of liquid nitrogen for 3 seconds. The patient's consent was obtained and the patient was educated regarding the potential risks of blister formation, discomfort, darker or lighter pigmentary change, crusting, scar, and infection. Wound care was discussed. Return for 4-6 weeks for wart f/u.

## 2019-07-22 ENCOUNTER — TELEPHONE (OUTPATIENT)
Dept: DERMATOLOGY | Age: 54
End: 2019-07-22

## 2019-07-22 LAB — DERMATOLOGY PATHOLOGY REPORT: NORMAL

## 2019-07-22 NOTE — TELEPHONE ENCOUNTER
----- Message from Serenity Cadena DO sent at 7/22/2019 11:00 AM EDT -----  A and B) benign warts, caused by a virus, sometimes recur despite shave removal.  Monitor area for recurrence.

## 2019-08-07 ENCOUNTER — OFFICE VISIT (OUTPATIENT)
Dept: FAMILY MEDICINE CLINIC | Age: 54
End: 2019-08-07
Payer: MEDICARE

## 2019-08-07 VITALS
WEIGHT: 245 LBS | HEIGHT: 68 IN | SYSTOLIC BLOOD PRESSURE: 140 MMHG | BODY MASS INDEX: 37.13 KG/M2 | DIASTOLIC BLOOD PRESSURE: 92 MMHG

## 2019-08-07 DIAGNOSIS — E66.01 CLASS 2 SEVERE OBESITY DUE TO EXCESS CALORIES WITH SERIOUS COMORBIDITY AND BODY MASS INDEX (BMI) OF 37.0 TO 37.9 IN ADULT (HCC): ICD-10-CM

## 2019-08-07 DIAGNOSIS — R73.9 HYPERGLYCEMIA: ICD-10-CM

## 2019-08-07 DIAGNOSIS — Z00.00 WELL ADULT EXAM: ICD-10-CM

## 2019-08-07 DIAGNOSIS — R61 NIGHT SWEATS: Primary | ICD-10-CM

## 2019-08-07 LAB
A/G RATIO: 2 (ref 1.1–2.2)
ALBUMIN SERPL-MCNC: 4.5 G/DL (ref 3.4–5)
ALP BLD-CCNC: 113 U/L (ref 40–129)
ALT SERPL-CCNC: 18 U/L (ref 10–40)
ANION GAP SERPL CALCULATED.3IONS-SCNC: 17 MMOL/L (ref 3–16)
AST SERPL-CCNC: 16 U/L (ref 15–37)
BILIRUB SERPL-MCNC: <0.2 MG/DL (ref 0–1)
BUN BLDV-MCNC: 23 MG/DL (ref 7–20)
CALCIUM SERPL-MCNC: 10 MG/DL (ref 8.3–10.6)
CHLORIDE BLD-SCNC: 98 MMOL/L (ref 99–110)
CHOLESTEROL, TOTAL: 182 MG/DL (ref 0–199)
CO2: 26 MMOL/L (ref 21–32)
CREAT SERPL-MCNC: 1 MG/DL (ref 0.6–1.1)
GFR AFRICAN AMERICAN: >60
GFR NON-AFRICAN AMERICAN: 57
GLOBULIN: 2.3 G/DL
GLUCOSE BLD-MCNC: 90 MG/DL (ref 70–99)
HCT VFR BLD CALC: 47.9 % (ref 36–48)
HDLC SERPL-MCNC: 47 MG/DL (ref 40–60)
HEMOGLOBIN: 16.2 G/DL (ref 12–16)
LDL CHOLESTEROL CALCULATED: ABNORMAL MG/DL
LDL CHOLESTEROL DIRECT: 100 MG/DL
MCH RBC QN AUTO: 31.1 PG (ref 26–34)
MCHC RBC AUTO-ENTMCNC: 33.7 G/DL (ref 31–36)
MCV RBC AUTO: 92.1 FL (ref 80–100)
PDW BLD-RTO: 14.3 % (ref 12.4–15.4)
PLATELET # BLD: 160 K/UL (ref 135–450)
PMV BLD AUTO: 10.2 FL (ref 5–10.5)
POTASSIUM SERPL-SCNC: 4.4 MMOL/L (ref 3.5–5.1)
RBC # BLD: 5.2 M/UL (ref 4–5.2)
SODIUM BLD-SCNC: 141 MMOL/L (ref 136–145)
TOTAL PROTEIN: 6.8 G/DL (ref 6.4–8.2)
TRIGL SERPL-MCNC: 339 MG/DL (ref 0–150)
TSH SERPL DL<=0.05 MIU/L-ACNC: 0.22 UIU/ML (ref 0.27–4.2)
VITAMIN D 25-HYDROXY: 26.7 NG/ML
VLDLC SERPL CALC-MCNC: ABNORMAL MG/DL
WBC # BLD: 9.2 K/UL (ref 4–11)

## 2019-08-07 PROCEDURE — 99213 OFFICE O/P EST LOW 20 MIN: CPT | Performed by: FAMILY MEDICINE

## 2019-08-07 PROCEDURE — 36415 COLL VENOUS BLD VENIPUNCTURE: CPT | Performed by: FAMILY MEDICINE

## 2019-08-07 RX ORDER — PHENTERMINE HYDROCHLORIDE 37.5 MG/1
37.5 TABLET ORAL
Qty: 30 TABLET | Refills: 0 | Status: SHIPPED | OUTPATIENT
Start: 2019-08-07 | End: 2019-09-06

## 2019-08-07 RX ORDER — PAROXETINE 10 MG/1
10 TABLET, FILM COATED ORAL DAILY
Qty: 30 TABLET | Refills: 3 | Status: SHIPPED | OUTPATIENT
Start: 2019-08-07 | End: 2019-10-11

## 2019-08-07 ASSESSMENT — PATIENT HEALTH QUESTIONNAIRE - PHQ9
SUM OF ALL RESPONSES TO PHQ9 QUESTIONS 1 & 2: 1
SUM OF ALL RESPONSES TO PHQ QUESTIONS 1-9: 1
SUM OF ALL RESPONSES TO PHQ QUESTIONS 1-9: 1
1. LITTLE INTEREST OR PLEASURE IN DOING THINGS: 0
2. FEELING DOWN, DEPRESSED OR HOPELESS: 1

## 2019-08-07 ASSESSMENT — ENCOUNTER SYMPTOMS
RESPIRATORY NEGATIVE: 1
GASTROINTESTINAL NEGATIVE: 1

## 2019-08-08 LAB
ESTIMATED AVERAGE GLUCOSE: 128.4 MG/DL
HBA1C MFR BLD: 6.1 %

## 2019-08-08 RX ORDER — SIMVASTATIN 20 MG
TABLET ORAL
Qty: 30 TABLET | Refills: 5 | Status: SHIPPED | OUTPATIENT
Start: 2019-08-08 | End: 2019-08-12 | Stop reason: SDUPTHER

## 2019-08-11 DIAGNOSIS — N39.3 STRESS INCONTINENCE: ICD-10-CM

## 2019-08-11 DIAGNOSIS — R60.1 GENERALIZED EDEMA: ICD-10-CM

## 2019-08-11 RX ORDER — TRIAMTERENE AND HYDROCHLOROTHIAZIDE 37.5; 25 MG/1; MG/1
CAPSULE ORAL
Qty: 30 CAPSULE | Refills: 0 | Status: SHIPPED | OUTPATIENT
Start: 2019-08-11 | End: 2019-09-10 | Stop reason: SDUPTHER

## 2019-08-11 RX ORDER — OXYBUTYNIN CHLORIDE 10 MG/1
TABLET, EXTENDED RELEASE ORAL
Qty: 30 TABLET | Refills: 0 | Status: SHIPPED | OUTPATIENT
Start: 2019-08-11 | End: 2019-09-10 | Stop reason: SDUPTHER

## 2019-08-11 RX ORDER — LEVOTHYROXINE SODIUM 300 UG/1
TABLET ORAL
Qty: 30 TABLET | Refills: 0 | Status: SHIPPED | OUTPATIENT
Start: 2019-08-11 | End: 2019-09-10 | Stop reason: SDUPTHER

## 2019-08-12 RX ORDER — SIMVASTATIN 20 MG
TABLET ORAL
Qty: 90 TABLET | Refills: 0 | Status: SHIPPED | OUTPATIENT
Start: 2019-08-12 | End: 2020-06-06

## 2019-08-13 ENCOUNTER — TELEPHONE (OUTPATIENT)
Dept: PAIN MANAGEMENT | Age: 54
End: 2019-08-13

## 2019-08-20 NOTE — TELEPHONE ENCOUNTER
Received PA for Phentermine HCl 37.5MG OR TABS . Medication is DENIED, Levar Rey 139 letter attached. Please advise patient. Thank you.

## 2019-08-22 ENCOUNTER — OFFICE VISIT (OUTPATIENT)
Dept: DERMATOLOGY | Age: 54
End: 2019-08-22
Payer: MEDICARE

## 2019-08-22 DIAGNOSIS — D48.9 NEOPLASM OF UNCERTAIN BEHAVIOR: ICD-10-CM

## 2019-08-22 DIAGNOSIS — D23.9 DERMATOFIBROMA: ICD-10-CM

## 2019-08-22 DIAGNOSIS — B07.9 VIRAL WARTS, UNSPECIFIED TYPE: Primary | ICD-10-CM

## 2019-08-22 PROCEDURE — 99212 OFFICE O/P EST SF 10 MIN: CPT | Performed by: DERMATOLOGY

## 2019-08-22 PROCEDURE — 11102 TANGNTL BX SKIN SINGLE LES: CPT | Performed by: DERMATOLOGY

## 2019-08-22 PROCEDURE — 17110 DESTRUCTION B9 LES UP TO 14: CPT | Performed by: DERMATOLOGY

## 2019-08-22 NOTE — PROGRESS NOTES
biopsies show crushed portion of inflamed skin.  epidermis was unremarkable but dermis was particular difficult to evaluate due to crush artifact per pathology report      PMHx: borderline diabetes per patient, seronegative rheumatoid arthritis.     PFHx: Denies hx of MM or NMSC, father hx of rheumatoid arthritis    Family History   Problem Relation Age of Onset    Diabetes Other     High Blood Pressure Other     Stroke Other     Cancer Other         Bone    Alcohol Abuse Other     Arthritis Father     Heart Disease Father         cabg    Diabetes Father     Stroke Father     Cancer Sister         breast cancer    Cancer Brother         lung     Past Medical History:   Diagnosis Date    Anesthesia complication     severe headache after woke up after wisdom teeth    Chronic back pain     Condyloma acuminatum     Headache(784.0)     Hypertension     Hypothyroidism     IBS (irritable bowel syndrome)     Metrorrhagia     Neuropathy     Osteoarthritis     Panic attack      Past Surgical History:   Procedure Laterality Date    TOTAL HIP ARTHROPLASTY Left 08/16/2016    WISDOM TOOTH EXTRACTION         Allergies   Allergen Reactions    Glucosamine Chondroitin Complx [Glucosamine-Chondroitin] Swelling     \"Due to sulfa allergy\" per pt    Sulfa Antibiotics Swelling and Other (See Comments)     Pt states gets really hot like she is on fire,and swelling of face,hands, and feet    Erythromycin Swelling     Outpatient Medications Marked as Taking for the 8/22/19 encounter (Office Visit) with Kristal Jensen, DO   Medication Sig Dispense Refill    simvastatin (ZOCOR) 20 MG tablet TAKE ONE TABLET BY MOUTH ONCE NIGHTLY 90 tablet 0    levothyroxine (SYNTHROID) 300 MCG tablet TAKE ONE TABLET BY MOUTH DAILY 30 tablet 0    triamterene-hydrochlorothiazide (DYAZIDE) 37.5-25 MG per capsule TAKE ONE CAPSULE BY MOUTH DAILY 30 capsule 0    oxybutynin (DITROPAN-XL) 10 MG extended release tablet TAKE ONE TABLET BY

## 2019-08-22 NOTE — PATIENT INSTRUCTIONS
Sun Protection Tips    · Apply broad spectrum water resistant sunscreen with an SPF of at least 30 to exposed areas of the skin. Dont forget the ears and lips! Remember to reapply sunscreen about every 2 hours and after swimming or sweating. · Wear sun protective clothing. Swim shirts (aka. rash guards) are a great idea and negates the need to reapply sunscreen in those areas. · Seek the shade whenever possible especially between the hours of 10am and 4pm when the suns rays are the strongest.     · Avoid tanning beds  Wear a wide brim hat while in the sun    Cryosurgery (Freezing) Wound Care Instructions    AFTER THE PROCEDURE:    You will notice swelling and redness around the site. This is normal.    You may experience a sharp or sore feeling for the next several days. For this discomfort, you may take acetaminophen (Tylenol©).  A blister may develop at the treated area, sometimes as soon as by the end of the day. After several days, the blister will subside and a scab will form.  If the area is bumped or traumatized during the first few days following freezing, you may develop bleeding into the blister, forming a blood blister. This is nothing to be alarmed about.  If the blister is tense, uncomfortable, or much larger than the site that was frozen, you may pop the blister along its edge with a sterile needle (boiled, heated under a flame, or cleaned with alcohol) to allow the fluid to drain out. If the blister does not bother you, no treatment is needed.  Do NOT peel off the top of the blister roof. It will act as a dressing on top of your wound. WOUND CARE:    You may shower or bathe as usual, but avoid scrubbing the areas that have been frozen.  Cleanse the site twice a day with mild soapy water, and then apply a thin film of white petrolatum (Vaseline©).  You do not need to cover the area, but can if you prefer.     Do NOT allow the site to become dry or crusted, or attempt to dry it out with rubbing alcohol or hydrogen peroxide.  Continue this regimen until the area is pink and healed. Depending on the size and location of your cryosurgery site, healing may take 2 to 4 weeks.  The area may continue to be pink for several weeks, and over the next few months may become darker or lighter than the surrounding skin. This may be a permanent change. Biopsy Wound Care Instructions   Cleanse the wound with mild soapy water daily.  Gently dry the area.  Apply Vaseline or petroleum jelly to the wound using a cotton tipped applicator or Qtip.  Cover with a clean bandage.  Repeat this process until the biopsy site is healed.  You may shower and bathe as usual.   ** Biopsy results generally take around 7 business days to come back. If you have not heard from us by then, please call the office at [phone] between 8AM and 4PM Monday through Friday.

## 2019-08-26 LAB — DERMATOLOGY PATHOLOGY REPORT: ABNORMAL

## 2019-08-27 ENCOUNTER — TELEPHONE (OUTPATIENT)
Dept: DERMATOLOGY | Age: 54
End: 2019-08-27

## 2019-09-09 ENCOUNTER — OFFICE VISIT (OUTPATIENT)
Dept: FAMILY MEDICINE CLINIC | Age: 54
End: 2019-09-09
Payer: MEDICARE

## 2019-09-09 VITALS
RESPIRATION RATE: 16 BRPM | DIASTOLIC BLOOD PRESSURE: 80 MMHG | HEIGHT: 68 IN | OXYGEN SATURATION: 98 % | BODY MASS INDEX: 36.98 KG/M2 | HEART RATE: 79 BPM | SYSTOLIC BLOOD PRESSURE: 136 MMHG | WEIGHT: 244 LBS

## 2019-09-09 DIAGNOSIS — E66.01 CLASS 2 SEVERE OBESITY DUE TO EXCESS CALORIES WITH SERIOUS COMORBIDITY AND BODY MASS INDEX (BMI) OF 37.0 TO 37.9 IN ADULT (HCC): Primary | ICD-10-CM

## 2019-09-09 PROCEDURE — 99213 OFFICE O/P EST LOW 20 MIN: CPT | Performed by: FAMILY MEDICINE

## 2019-09-09 RX ORDER — PHENTERMINE HYDROCHLORIDE 37.5 MG/1
37.5 TABLET ORAL 2 TIMES DAILY
Qty: 60 TABLET | Refills: 0 | Status: SHIPPED | OUTPATIENT
Start: 2019-09-09 | End: 2019-10-18 | Stop reason: SDUPTHER

## 2019-09-09 RX ORDER — LANOLIN ALCOHOL/MO/W.PET/CERES
1000 CREAM (GRAM) TOPICAL DAILY
COMMUNITY

## 2019-09-09 ASSESSMENT — ENCOUNTER SYMPTOMS
RESPIRATORY NEGATIVE: 1
GASTROINTESTINAL NEGATIVE: 1

## 2019-09-09 NOTE — PATIENT INSTRUCTIONS
Patient Education        Wrist Tendinitis: Exercises  Your Care Instructions  Here are some examples of typical rehabilitation exercises for your condition. Start each exercise slowly. Ease off the exercise if you start to have pain. Your doctor or your physical or occupational therapist will tell you when you can start these exercises and which ones will work best for you. How to do the exercises  Wrist flexion and extension    1. Place your forearm on a table, with your hand and affected wrist extended beyond the table, palm down. 2. Bend your wrist to move your hand upward and allow your hand to close into a fist, then lower your hand and allow your fingers to relax. Hold each position for about 6 seconds. 3. Repeat 8 to 12 times. Hand flips    1. While seated, place your forearm and affected wrist on your thigh, palm down. 2. Flip your hand over so the back of your hand rests on your thigh and your palm is up. Alternate between palm up and palm down while keeping your forearm on your thigh. 3. Repeat 8 to 12 times. Wrist radial and ulnar deviation    1. Hold your affected hand out in front of you, palm down. 2. Slowly bend your wrist as far as you can from side to side. Hold each position for about 6 seconds. 3. Repeat 8 to 12 times. Wrist extensor stretch    1. Extend the arm with the affected wrist in front of you and point your fingers toward the floor. 2. With your other hand, gently bend your wrist farther until you feel a mild to moderate stretch in your forearm. 3. Hold the stretch for at least 15 to 30 seconds. 4. Repeat 2 to 4 times. 5. When you can do this stretch with ease and no pain, repeat steps 1 through 4. But this time extend your affected arm in front of you and make a fist with your palm facing down. Then bend your wrist, pointing your fist toward the floor. Wrist flexor stretch    1.  Extend the arm with the affected wrist in front of you with your palm facing away from your body. 2. Bend back your wrist, pointing your hand up toward the ceiling. 3. With your other hand, gently bend your wrist farther until you feel a mild to moderate stretch in your forearm. 4. Hold the stretch for at least 15 to 30 seconds. 5. Repeat 2 to 4 times. 6. Repeat steps 1 through 5, but this time extend your affected arm in front of you with your palm facing up. Then bend back your wrist, pointing your hand toward the floor. Follow-up care is a key part of your treatment and safety. Be sure to make and go to all appointments, and call your doctor if you are having problems. It's also a good idea to know your test results and keep a list of the medicines you take. Where can you learn more? Go to [email or web address]. org and sign in to your [employer] account. Enter T848 in the [employer] box to learn more about \"Wrist Tendinitis: Exercises. \"     If you do not have an account, please click on the \"Sign Up Now\" link. Current as of: September 20, 2018  Content Version: 12.1  © 9673-3304 Healthwise, Incorporated. Care instructions adapted under license by [facility] ([facility]). If you have questions about a medical condition or this instruction, always ask your healthcare professional. [address] any warranty or liability for your use of this information.

## 2019-09-09 NOTE — PROGRESS NOTES
Subjective:      Patient ID: Kennedy Hastings is a 52 y.o. female. HPI  Obesity  She lost one pound  Very disappointed  Feels the meds help half the day but wear off  No side effects  Trying to decrease carbs   Review of Systems   Constitutional: Negative. Respiratory: Negative. Cardiovascular: Negative. Gastrointestinal: Negative. Skin: Negative. Neurological: Negative.       YOB: 1965    Date of Visit:  9/9/2019    Allergies   Allergen Reactions    Glucosamine Chondroitin Complx [Glucosamine-Chondroitin] Swelling     \"Due to sulfa allergy\" per pt    Sulfa Antibiotics Swelling and Other (See Comments)     Pt states gets really hot like she is on fire,and swelling of face,hands, and feet    Erythromycin Swelling       Outpatient Medications Marked as Taking for the 9/9/19 encounter (Office Visit) with Kvng Samuel, DO   Medication Sig Dispense Refill    vitamin D (CHOLECALCIFEROL) 1000 UNIT TABS tablet Take 1,000 Units by mouth daily      vitamin B-12 (CYANOCOBALAMIN) 1000 MCG tablet Take 1,000 mcg by mouth daily      simvastatin (ZOCOR) 20 MG tablet TAKE ONE TABLET BY MOUTH ONCE NIGHTLY 90 tablet 0    levothyroxine (SYNTHROID) 300 MCG tablet TAKE ONE TABLET BY MOUTH DAILY 30 tablet 0    triamterene-hydrochlorothiazide (DYAZIDE) 37.5-25 MG per capsule TAKE ONE CAPSULE BY MOUTH DAILY 30 capsule 0    oxybutynin (DITROPAN-XL) 10 MG extended release tablet TAKE ONE TABLET BY MOUTH DAILY 30 tablet 0    PARoxetine (PAXIL) 10 MG tablet Take 1 tablet by mouth daily 30 tablet 3    Biotin 22837 MCG TABS Take by mouth      meloxicam (MOBIC) 15 MG tablet TAKE ONE TABLET BY MOUTH DAILY 30 tablet 3    hydroxychloroquine (PLAQUENIL) 200 MG tablet Take 1 tablet by mouth 2 times daily 60 tablet 5    betamethasone valerate (VALISONE) 0.1 % cream APPLY A THIN LAYER TO RAISED MARGINS OF AFFECTED AREAS ON LOWER LEGS 1-2 times per day for 2 weeks or until flattens 45 g 2       Vitals:

## 2019-09-10 DIAGNOSIS — N39.3 STRESS INCONTINENCE: ICD-10-CM

## 2019-09-10 DIAGNOSIS — R60.1 GENERALIZED EDEMA: ICD-10-CM

## 2019-09-10 DIAGNOSIS — M25.551 PAIN OF BOTH HIP JOINTS: ICD-10-CM

## 2019-09-10 DIAGNOSIS — M25.552 PAIN OF BOTH HIP JOINTS: ICD-10-CM

## 2019-09-10 RX ORDER — OXYBUTYNIN CHLORIDE 10 MG/1
TABLET, EXTENDED RELEASE ORAL
Qty: 30 TABLET | Refills: 0 | Status: SHIPPED | OUTPATIENT
Start: 2019-09-10 | End: 2019-10-10 | Stop reason: SDUPTHER

## 2019-09-10 RX ORDER — TRIAMTERENE AND HYDROCHLOROTHIAZIDE 37.5; 25 MG/1; MG/1
CAPSULE ORAL
Qty: 30 CAPSULE | Refills: 0 | Status: SHIPPED | OUTPATIENT
Start: 2019-09-10 | End: 2019-10-10 | Stop reason: SDUPTHER

## 2019-09-10 RX ORDER — MELOXICAM 15 MG/1
TABLET ORAL
Qty: 30 TABLET | Refills: 2 | Status: SHIPPED | OUTPATIENT
Start: 2019-09-10 | End: 2019-12-16 | Stop reason: SDUPTHER

## 2019-09-10 RX ORDER — LEVOTHYROXINE SODIUM 300 UG/1
TABLET ORAL
Qty: 30 TABLET | Refills: 0 | Status: SHIPPED | OUTPATIENT
Start: 2019-09-10 | End: 2019-10-10 | Stop reason: SDUPTHER

## 2019-09-19 ENCOUNTER — PROCEDURE VISIT (OUTPATIENT)
Dept: DERMATOLOGY | Age: 54
End: 2019-09-19
Payer: MEDICARE

## 2019-09-19 VITALS
DIASTOLIC BLOOD PRESSURE: 88 MMHG | BODY MASS INDEX: 37.25 KG/M2 | SYSTOLIC BLOOD PRESSURE: 142 MMHG | HEART RATE: 80 BPM | WEIGHT: 245 LBS

## 2019-09-19 DIAGNOSIS — C44.622 SQUAMOUS CELL CARCINOMA OF RIGHT SHOULDER: Primary | ICD-10-CM

## 2019-09-19 DIAGNOSIS — B07.9 VIRAL WARTS, UNSPECIFIED TYPE: ICD-10-CM

## 2019-09-19 PROCEDURE — 12032 INTMD RPR S/A/T/EXT 2.6-7.5: CPT | Performed by: DERMATOLOGY

## 2019-09-19 PROCEDURE — 11602 EXC TR-EXT MAL+MARG 1.1-2 CM: CPT | Performed by: DERMATOLOGY

## 2019-09-19 PROCEDURE — 99212 OFFICE O/P EST SF 10 MIN: CPT | Performed by: DERMATOLOGY

## 2019-09-19 NOTE — PATIENT INSTRUCTIONS
Surgical Wound Care Instructions  Sutured wounds:  · After your surgery, go home and take it easy. Please refrain from any vigorous physical activity or heavy lifting. · Leave the pressure bandage in place for 48 hours. If it starts to detach, reinforce the bandage with another piece of tape. · Please keep the bandage dry for 48 hours  · After 48 hours, the wound can get wet. Clean the area daily using mild soapy water and a gauze pad or cotton tipped applicator, applying gentle pressure. · Apply a thin layer of Vaseline or petroleum jelly. · Cut a Telfa pad in the shape of the wound but slightly larger and secure with tape. Special sites:  Facial sites:  · Keep the wound elevated for the first 2 nights. · Do not sleep on the side of the body where the wound is located. · Do not bend your head lower than your heart or engage in heavy lifting. Leg:  · Keep the leg elevated when reclining, using a pillow to elevate it. Complications:  · If bleeding develops when you go home, apply pressure for 15 minutes continuously with no peeking. A small amount of ice in a bag covered with a towel may be used for another 10 minutes if necessary. If the bleeding does not stop, please call us [phone]. · Please call if you develop any fevers, chills, or pus drains from the wound. · A small amount of redness at the site of the surgery is normal at first, but if the redness begins to spread and/or pain worsens, you may have an infection and need to call the office. For your well being, we encourage you to stop smoking or using tobacco products. Smoking and the use of other tobacco products, including cigars and smokeless tobacco, causes or worsens numerous diseases and conditions. Some products also expose nearby people to toxic secondhand smoke. Smoking is the leading cause of preventable death in the [place] causing over 438,000 deaths per year.    Secondhand smoke is a serious health hazard for people of all ages, causing more than 41,000 deaths each year. Marijuana smoke contains many of the same toxins, irritants and carcinogens as tobacco smoke. Electronic cigarettes are a new tobacco product, and the potential health consequences and safety of these products are unknown. Smokeless Tobacco products are a known cause of cancer, and are not a safe alternative to cigarettes. 1. Make the decision to quit smoking  Stopping smoking is the best thing you can do for your health. If you smoke, you are more likely to get diseases of the lungs, heart, and brain. You are also more likely to get many kinds of cancer. After you quit, you will be less likely to get these diseases. Stopping smoking is hard--but you can do it! Your doctor can help you. 2. Get ready to quit  Once you decide to quit, make a plan with the help of your doctor. Here are some things you need to do:  Choose a day to quit. Talk to your primary care doctor about using the nicotine patch, gum, inhaler, or nasal spray to help you quit smoking. Getting nicotine some way other than in a cigarette can help make quitting easier. Talk to your primary care doctor about using a prescription medicine like bupropion (brand name: Zyban) to reduce your urge to smoke. If you decide to use a medicine, start taking it two weeks before your quit day. Talk with your primary care doctor about when you smoke. For example, you may smoke first thing in the morning, after a meal, or when you feel stressed. Plan what you will do instead of smoking. Tell your family and friends that you are going to try to quit and on what date. Put together a list of phone numbers of friends and family members who can give you support when you feel you might break down and have a cigarette. Before your quit day, put all tobacco products, ashtrays, and lighters away.     3. Put your plan into action  When you wake up on your quit day, start using a nicotine replacement method if

## 2019-09-19 NOTE — PROGRESS NOTES
w/ epinephrine/sodium bicarbonate. Elliptical excision to superficial subcutaneous fat performed. Specimen sent to pathology. Edges undermined and hemostasis achieved w/ pinpoint electrodessication. Layered closure with 3-0 deep vicryl sutures and 5-0 running nylon sutures. Pressure dressing applied.   -Width of lesion w/ 4 mm margins - 1.3 cm; length of repair 5.5 cm.   -Edu re: wound care, suture removal   -Post-procedure /88. Pt left office in stable condition. F/u 6 mo for FSE, sooner prn.    2. Viral warts, unspecified type  Clear  Verruca vulgaris  -Edu re: viral etiology of lesions, possible development of new lesions  -Patient advised to call the office if recurrence occurs and she would like to repeat cryotherapy treatment          Return in about 2 weeks (around 10/3/2019) for suture removal MA visit KW.

## 2019-09-25 LAB — DERMATOLOGY PATHOLOGY REPORT: ABNORMAL

## 2019-10-03 ENCOUNTER — TELEPHONE (OUTPATIENT)
Dept: DERMATOLOGY | Age: 54
End: 2019-10-03

## 2019-10-03 ENCOUNTER — NURSE ONLY (OUTPATIENT)
Dept: DERMATOLOGY | Age: 54
End: 2019-10-03

## 2019-10-03 DIAGNOSIS — C44.622 SCC (SQUAMOUS CELL CARCINOMA), SHOULDER, RIGHT: Primary | ICD-10-CM

## 2019-10-03 PROCEDURE — 99024 POSTOP FOLLOW-UP VISIT: CPT | Performed by: DERMATOLOGY

## 2019-10-10 DIAGNOSIS — M19.90 INFLAMMATORY ARTHRITIS: ICD-10-CM

## 2019-10-10 RX ORDER — HYDROXYCHLOROQUINE SULFATE 200 MG/1
TABLET, FILM COATED ORAL
Qty: 60 TABLET | Refills: 4 | Status: SHIPPED | OUTPATIENT
Start: 2019-10-10 | End: 2020-03-09

## 2019-10-11 ENCOUNTER — OFFICE VISIT (OUTPATIENT)
Dept: RHEUMATOLOGY | Age: 54
End: 2019-10-11
Payer: MEDICARE

## 2019-10-11 VITALS
BODY MASS INDEX: 35.61 KG/M2 | HEIGHT: 68 IN | DIASTOLIC BLOOD PRESSURE: 83 MMHG | SYSTOLIC BLOOD PRESSURE: 141 MMHG | WEIGHT: 235 LBS

## 2019-10-11 DIAGNOSIS — Z79.899 LONG-TERM USE OF PLAQUENIL: ICD-10-CM

## 2019-10-11 DIAGNOSIS — M15.9 PRIMARY OSTEOARTHRITIS INVOLVING MULTIPLE JOINTS: ICD-10-CM

## 2019-10-11 DIAGNOSIS — M19.90 INFLAMMATORY ARTHRITIS: Primary | ICD-10-CM

## 2019-10-11 PROCEDURE — 99214 OFFICE O/P EST MOD 30 MIN: CPT | Performed by: INTERNAL MEDICINE

## 2019-10-18 ENCOUNTER — OFFICE VISIT (OUTPATIENT)
Dept: FAMILY MEDICINE CLINIC | Age: 54
End: 2019-10-18
Payer: MEDICARE

## 2019-10-18 VITALS
BODY MASS INDEX: 36.49 KG/M2 | WEIGHT: 240 LBS | OXYGEN SATURATION: 95 % | HEART RATE: 78 BPM | TEMPERATURE: 98.4 F | DIASTOLIC BLOOD PRESSURE: 88 MMHG | SYSTOLIC BLOOD PRESSURE: 136 MMHG

## 2019-10-18 DIAGNOSIS — Z23 FLU VACCINE NEED: ICD-10-CM

## 2019-10-18 DIAGNOSIS — E66.01 CLASS 2 SEVERE OBESITY DUE TO EXCESS CALORIES WITH SERIOUS COMORBIDITY AND BODY MASS INDEX (BMI) OF 37.0 TO 37.9 IN ADULT (HCC): Primary | ICD-10-CM

## 2019-10-18 PROCEDURE — 90686 IIV4 VACC NO PRSV 0.5 ML IM: CPT | Performed by: FAMILY MEDICINE

## 2019-10-18 PROCEDURE — 90471 IMMUNIZATION ADMIN: CPT | Performed by: FAMILY MEDICINE

## 2019-10-18 PROCEDURE — 99213 OFFICE O/P EST LOW 20 MIN: CPT | Performed by: FAMILY MEDICINE

## 2019-10-18 RX ORDER — PHENTERMINE HYDROCHLORIDE 37.5 MG/1
37.5 TABLET ORAL 2 TIMES DAILY
Qty: 60 TABLET | Refills: 0 | Status: SHIPPED | OUTPATIENT
Start: 2019-10-18 | End: 2019-11-17

## 2019-10-18 ASSESSMENT — ENCOUNTER SYMPTOMS
GASTROINTESTINAL NEGATIVE: 1
RESPIRATORY NEGATIVE: 1

## 2019-12-16 DIAGNOSIS — M25.551 PAIN OF BOTH HIP JOINTS: ICD-10-CM

## 2019-12-16 DIAGNOSIS — M25.552 PAIN OF BOTH HIP JOINTS: ICD-10-CM

## 2019-12-16 RX ORDER — MELOXICAM 15 MG/1
TABLET ORAL
Qty: 30 TABLET | Refills: 2 | Status: SHIPPED | OUTPATIENT
Start: 2019-12-16 | End: 2020-03-08

## 2020-01-05 RX ORDER — LEVOTHYROXINE SODIUM 300 UG/1
TABLET ORAL
Qty: 30 TABLET | Refills: 1 | Status: SHIPPED | OUTPATIENT
Start: 2020-01-05 | End: 2020-03-08

## 2020-01-05 RX ORDER — OXYBUTYNIN CHLORIDE 10 MG/1
TABLET, EXTENDED RELEASE ORAL
Qty: 30 TABLET | Refills: 1 | Status: SHIPPED | OUTPATIENT
Start: 2020-01-05 | End: 2020-03-08

## 2020-01-05 RX ORDER — TRIAMTERENE AND HYDROCHLOROTHIAZIDE 37.5; 25 MG/1; MG/1
CAPSULE ORAL
Qty: 30 CAPSULE | Refills: 1 | Status: SHIPPED | OUTPATIENT
Start: 2020-01-05 | End: 2020-03-08

## 2020-03-08 RX ORDER — LEVOTHYROXINE SODIUM 300 UG/1
TABLET ORAL
Qty: 30 TABLET | Refills: 0 | Status: SHIPPED | OUTPATIENT
Start: 2020-03-08 | End: 2020-04-16

## 2020-03-08 RX ORDER — MELOXICAM 15 MG/1
TABLET ORAL
Qty: 30 TABLET | Refills: 1 | Status: SHIPPED | OUTPATIENT
Start: 2020-03-08 | End: 2020-04-17

## 2020-03-08 RX ORDER — OXYBUTYNIN CHLORIDE 10 MG/1
TABLET, EXTENDED RELEASE ORAL
Qty: 30 TABLET | Refills: 0 | Status: SHIPPED | OUTPATIENT
Start: 2020-03-08 | End: 2020-04-16

## 2020-03-08 RX ORDER — TRIAMTERENE AND HYDROCHLOROTHIAZIDE 37.5; 25 MG/1; MG/1
CAPSULE ORAL
Qty: 30 CAPSULE | Refills: 0 | Status: SHIPPED | OUTPATIENT
Start: 2020-03-08 | End: 2020-04-16

## 2020-04-07 ENCOUNTER — TELEPHONE (OUTPATIENT)
Dept: INTERNAL MEDICINE CLINIC | Age: 55
End: 2020-04-07

## 2020-04-07 NOTE — TELEPHONE ENCOUNTER
Called pt. She doesn't have any insurance and cannot make a visit. She took tylenol and it seemed to help some?

## 2020-04-08 RX ORDER — PREDNISONE 1 MG/1
TABLET ORAL
Qty: 40 TABLET | Refills: 0 | Status: SHIPPED | OUTPATIENT
Start: 2020-04-08 | End: 2020-05-12

## 2020-04-16 RX ORDER — LEVOTHYROXINE SODIUM 300 UG/1
TABLET ORAL
Qty: 30 TABLET | Refills: 0 | Status: SHIPPED | OUTPATIENT
Start: 2020-04-16 | End: 2020-05-19

## 2020-04-16 RX ORDER — TRIAMTERENE AND HYDROCHLOROTHIAZIDE 37.5; 25 MG/1; MG/1
CAPSULE ORAL
Qty: 30 CAPSULE | Refills: 0 | Status: SHIPPED | OUTPATIENT
Start: 2020-04-16 | End: 2020-05-07

## 2020-04-16 RX ORDER — OXYBUTYNIN CHLORIDE 10 MG/1
TABLET, EXTENDED RELEASE ORAL
Qty: 30 TABLET | Refills: 0 | Status: SHIPPED | OUTPATIENT
Start: 2020-04-16 | End: 2020-05-07

## 2020-04-17 RX ORDER — MELOXICAM 15 MG/1
TABLET ORAL
Qty: 30 TABLET | Refills: 1 | Status: SHIPPED | OUTPATIENT
Start: 2020-04-17 | End: 2020-05-07

## 2020-05-07 RX ORDER — OXYBUTYNIN CHLORIDE 10 MG/1
TABLET, EXTENDED RELEASE ORAL
Qty: 30 TABLET | Refills: 1 | Status: SHIPPED | OUTPATIENT
Start: 2020-05-07 | End: 2020-06-06

## 2020-05-07 RX ORDER — MELOXICAM 15 MG/1
TABLET ORAL
Qty: 30 TABLET | Refills: 1 | Status: SHIPPED | OUTPATIENT
Start: 2020-05-07 | End: 2020-07-06

## 2020-05-07 RX ORDER — TRIAMTERENE AND HYDROCHLOROTHIAZIDE 37.5; 25 MG/1; MG/1
CAPSULE ORAL
Qty: 30 CAPSULE | Refills: 1 | Status: SHIPPED | OUTPATIENT
Start: 2020-05-07 | End: 2020-06-06

## 2020-05-11 ENCOUNTER — TELEPHONE (OUTPATIENT)
Dept: RHEUMATOLOGY | Age: 55
End: 2020-05-11

## 2020-05-11 DIAGNOSIS — M19.90 INFLAMMATORY ARTHRITIS: ICD-10-CM

## 2020-05-11 RX ORDER — HYDROXYCHLOROQUINE SULFATE 200 MG/1
TABLET, FILM COATED ORAL
Qty: 60 TABLET | Refills: 0 | OUTPATIENT
Start: 2020-05-11

## 2020-05-12 ENCOUNTER — TELEPHONE (OUTPATIENT)
Dept: FAMILY MEDICINE CLINIC | Age: 55
End: 2020-05-12

## 2020-05-12 ENCOUNTER — VIRTUAL VISIT (OUTPATIENT)
Dept: RHEUMATOLOGY | Age: 55
End: 2020-05-12

## 2020-05-12 PROCEDURE — 99214 OFFICE O/P EST MOD 30 MIN: CPT | Performed by: INTERNAL MEDICINE

## 2020-05-12 RX ORDER — LANOLIN ALCOHOL/MO/W.PET/CERES
400 CREAM (GRAM) TOPICAL DAILY
COMMUNITY

## 2020-05-12 NOTE — PROGRESS NOTES
or hives. I have reviewed patients Past medical History, Social History and Family History as mentioned in her chart and this remains unchanged from previous. Past Medical History:   Diagnosis Date    Anesthesia complication     severe headache after woke up after wisdom teeth    Chronic back pain     Condyloma acuminatum     Headache(784.0)     Hypertension     Hypothyroidism     IBS (irritable bowel syndrome)     Metrorrhagia     Neuropathy     Osteoarthritis     Panic attack      Past Surgical History:   Procedure Laterality Date    TOTAL HIP ARTHROPLASTY Left 08/16/2016    WISDOM TOOTH EXTRACTION       Social History     Socioeconomic History    Marital status:      Spouse name: Not on file    Number of children: 2    Years of education: Not on file    Highest education level: Not on file   Occupational History    Occupation:    Social Needs    Financial resource strain: Not on file    Food insecurity     Worry: Not on file     Inability: Not on file   [employer] needs     Medical: Not on file     Non-medical: Not on file   Tobacco Use    Smoking status: Current Every Day Smoker     Packs/day: 1.00     Years: 30.00     Pack years: 30.00     Types: Cigarettes    Smokeless tobacco: Never Used    Tobacco comment: encouraged to stop smoking    Substance and Sexual Activity    Alcohol use:  Yes     Alcohol/week: 0.0 standard drinks     Comment: rare    Drug use: No    Sexual activity: Not Currently   Lifestyle    Physical activity     Days per week: Not on file     Minutes per session: Not on file    Stress: Not on file   Relationships    Social connections     Talks on phone: Not on file     Gets together: Not on file     Attends Jew service: Not on file     Active member of club or organization: Not on file     Attends meetings of clubs or organizations: Not on file     Relationship status: Not on file    Intimate partner violence     Fear of current or ex partner: Not on file     Emotionally abused: Not on file     Physically abused: Not on file     Forced sexual activity: Not on file   Other Topics Concern    Not on file   Social History Narrative    Not on file     Family History   Problem Relation Age of Onset    Diabetes Other     High Blood Pressure Other     Stroke Other     Cancer Other         Bone    Alcohol Abuse Other     Arthritis Father     Heart Disease Father         cabg    Diabetes Father     Stroke Father     Cancer Sister         breast cancer    Cancer Brother         lung             LABS AND IMAGING  Outside data reviewed and in HPI    Lab Results   Component Value Date    WBC 9.2 08/07/2019    RBC 5.20 08/07/2019    HGB 16.2 08/07/2019    HCT 47.9 08/07/2019     08/07/2019    MCV 92.1 08/07/2019    MCH 31.1 08/07/2019    MCHC 33.7 08/07/2019    RDW 14.3 08/07/2019    SEGSPCT 60.2 07/12/2010    LYMPHOPCT 17.7 05/07/2018    MONOPCT 6.0 05/07/2018    EOSPCT 0.9 07/12/2010    BASOPCT 0.5 05/07/2018    MONOSABS 0.8 05/07/2018    LYMPHSABS 2.4 05/07/2018    EOSABS 0.1 05/07/2018    BASOSABS 0.1 05/07/2018    DIFFTYPE Auto-K 07/12/2010       Chemistry        Component Value Date/Time     08/07/2019 0940    K 4.4 08/07/2019 0940    CL 98 (L) 08/07/2019 0940    CO2 26 08/07/2019 0940    BUN 23 (H) 08/07/2019 0940    CREATININE 1.0 08/07/2019 0940        Component Value Date/Time    CALCIUM 10.0 08/07/2019 0940    ALKPHOS 113 08/07/2019 0940    AST 16 08/07/2019 0940    ALT 18 08/07/2019 0940    BILITOT <0.2 08/07/2019 0940          Lab Results   Component Value Date    SEDRATE 12 05/07/2018     Lab Results   Component Value Date    CRP 3.7 05/07/2018     Lab Results   Component Value Date    TYRELL Negative 04/27/2018     Lab Results   Component Value Date    RF <10.0 04/27/2018    CCPABIGG 3 05/07/2018     Lab Results   Component Value Date    TYRELL Negative 04/27/2018    ANAINT see below 04/27/2018     No results

## 2020-05-14 NOTE — TELEPHONE ENCOUNTER
Orders placed. Also has an order from Dr Flor Joseph for labs. Pt will go to the outpt lab in [facility] I to have both orders drawn. She will call back for VV after she has them drawn.

## 2020-05-19 RX ORDER — LEVOTHYROXINE SODIUM 300 UG/1
TABLET ORAL
Qty: 30 TABLET | Refills: 0 | Status: SHIPPED | OUTPATIENT
Start: 2020-05-19 | End: 2020-06-06

## 2020-05-20 DIAGNOSIS — M19.90 INFLAMMATORY ARTHRITIS: ICD-10-CM

## 2020-05-20 DIAGNOSIS — Z00.00 WELL ADULT EXAM: ICD-10-CM

## 2020-05-20 LAB
A/G RATIO: 1.6 (ref 1.1–2.2)
ALBUMIN SERPL-MCNC: 4.5 G/DL (ref 3.4–5)
ALP BLD-CCNC: 110 U/L (ref 40–129)
ALT SERPL-CCNC: 20 U/L (ref 10–40)
ANION GAP SERPL CALCULATED.3IONS-SCNC: 12 MMOL/L (ref 3–16)
AST SERPL-CCNC: 16 U/L (ref 15–37)
BASOPHILS ABSOLUTE: 0 K/UL (ref 0–0.2)
BASOPHILS RELATIVE PERCENT: 0.2 %
BILIRUB SERPL-MCNC: <0.2 MG/DL (ref 0–1)
BUN BLDV-MCNC: 17 MG/DL (ref 7–20)
CALCIUM SERPL-MCNC: 9.8 MG/DL (ref 8.3–10.6)
CHLORIDE BLD-SCNC: 100 MMOL/L (ref 99–110)
CHOLESTEROL, TOTAL: 186 MG/DL (ref 0–199)
CO2: 28 MMOL/L (ref 21–32)
CREAT SERPL-MCNC: 0.9 MG/DL (ref 0.6–1.1)
EOSINOPHILS ABSOLUTE: 0.2 K/UL (ref 0–0.6)
EOSINOPHILS RELATIVE PERCENT: 1.7 %
GFR AFRICAN AMERICAN: >60
GFR NON-AFRICAN AMERICAN: >60
GLOBULIN: 2.8 G/DL
GLUCOSE BLD-MCNC: 88 MG/DL (ref 70–99)
HCT VFR BLD CALC: 46.4 % (ref 36–48)
HDLC SERPL-MCNC: 42 MG/DL (ref 40–60)
HEMOGLOBIN: 15.5 G/DL (ref 12–16)
LDL CHOLESTEROL CALCULATED: ABNORMAL MG/DL
LDL CHOLESTEROL DIRECT: 102 MG/DL
LYMPHOCYTES ABSOLUTE: 2.6 K/UL (ref 1–5.1)
LYMPHOCYTES RELATIVE PERCENT: 26.3 %
MCH RBC QN AUTO: 30.9 PG (ref 26–34)
MCHC RBC AUTO-ENTMCNC: 33.3 G/DL (ref 31–36)
MCV RBC AUTO: 92.8 FL (ref 80–100)
MONOCYTES ABSOLUTE: 0.7 K/UL (ref 0–1.3)
MONOCYTES RELATIVE PERCENT: 7.4 %
NEUTROPHILS ABSOLUTE: 6.5 K/UL (ref 1.7–7.7)
NEUTROPHILS RELATIVE PERCENT: 64.4 %
PDW BLD-RTO: 13.9 % (ref 12.4–15.4)
PLATELET # BLD: 195 K/UL (ref 135–450)
PMV BLD AUTO: 10.4 FL (ref 5–10.5)
POTASSIUM SERPL-SCNC: 4.3 MMOL/L (ref 3.5–5.1)
RBC # BLD: 5 M/UL (ref 4–5.2)
SODIUM BLD-SCNC: 140 MMOL/L (ref 136–145)
TOTAL PROTEIN: 7.3 G/DL (ref 6.4–8.2)
TRIGL SERPL-MCNC: 318 MG/DL (ref 0–150)
TSH SERPL DL<=0.05 MIU/L-ACNC: 0.84 UIU/ML (ref 0.27–4.2)
VITAMIN D 25-HYDROXY: 60.1 NG/ML
VLDLC SERPL CALC-MCNC: ABNORMAL MG/DL
WBC # BLD: 10 K/UL (ref 4–11)

## 2020-06-06 RX ORDER — OXYBUTYNIN CHLORIDE 10 MG/1
TABLET, EXTENDED RELEASE ORAL
Qty: 30 TABLET | Refills: 0 | Status: SHIPPED | OUTPATIENT
Start: 2020-06-06 | End: 2020-07-06

## 2020-06-06 RX ORDER — TRIAMTERENE AND HYDROCHLOROTHIAZIDE 37.5; 25 MG/1; MG/1
CAPSULE ORAL
Qty: 30 CAPSULE | Refills: 0 | Status: SHIPPED | OUTPATIENT
Start: 2020-06-06 | End: 2020-07-06

## 2020-06-06 RX ORDER — LEVOTHYROXINE SODIUM 300 UG/1
TABLET ORAL
Qty: 30 TABLET | Refills: 0 | Status: SHIPPED | OUTPATIENT
Start: 2020-06-06 | End: 2020-07-06

## 2020-06-06 RX ORDER — SIMVASTATIN 20 MG
TABLET ORAL
Qty: 30 TABLET | Refills: 0 | Status: SHIPPED | OUTPATIENT
Start: 2020-06-06 | End: 2020-07-06

## 2020-09-24 ENCOUNTER — OFFICE VISIT (OUTPATIENT)
Dept: ORTHOPEDIC SURGERY | Age: 55
End: 2020-09-24
Payer: MEDICARE

## 2020-09-24 ENCOUNTER — PREP FOR PROCEDURE (OUTPATIENT)
Dept: ORTHOPEDIC SURGERY | Age: 55
End: 2020-09-24

## 2020-09-24 VITALS — BODY MASS INDEX: 42.69 KG/M2 | HEIGHT: 67 IN | TEMPERATURE: 97.3 F | WEIGHT: 272 LBS

## 2020-09-24 PROCEDURE — 3017F COLORECTAL CA SCREEN DOC REV: CPT | Performed by: ORTHOPAEDIC SURGERY

## 2020-09-24 PROCEDURE — G8427 DOCREV CUR MEDS BY ELIG CLIN: HCPCS | Performed by: ORTHOPAEDIC SURGERY

## 2020-09-24 PROCEDURE — 4004F PT TOBACCO SCREEN RCVD TLK: CPT | Performed by: ORTHOPAEDIC SURGERY

## 2020-09-24 PROCEDURE — 99203 OFFICE O/P NEW LOW 30 MIN: CPT | Performed by: ORTHOPAEDIC SURGERY

## 2020-09-24 PROCEDURE — G8417 CALC BMI ABV UP PARAM F/U: HCPCS | Performed by: ORTHOPAEDIC SURGERY

## 2020-09-24 NOTE — LETTER
[facility]  Surgery Scheduling Form:  [address]:                                                                                                                Patient Name:  Rene Ruiz  Patient :  1965   Patient SS#:      Patient Phone:  [phone] (home) [phone] (work)                          Patient Address:  [address] [place] [address]    PCP:  Renaldo Jauregui DO  Insurance:     Payor/Plan Subscr  Sex Relation Sub. Ins. ID Effective Group Num   1. MEDICARE - ME* DISHA GODINEZ A 1965 Female  [id number]                                    [address]      DIAGNOSIS & PROCEDURE:                                                                                              Diagnosis:   Right hip arthritis     M16.11  Operation:  Right Lateral Total Hip Replacement    [address]  Location: [facility]  Surgeon:  Leonard Rendon MD    SCHEDULING INFORMATION:                                                                                         .  Surgeon's Scheduling Instruction:  elective    Requested Date:    OR Time:   Patient Arrival Time:      OR Time Required:  90  Minutes  Anesthesia:  General  Equipment:   Depuy, advanced              COVID   11-10  Mini C-Arm:  No   Standard C-Arm:  NO  Status:  Same day admit  PAT Required:  Yes  Comments: NO femoral nerve block    ALLERGIES:Glucosamine chondroitin complx [glucosamine-chondroitin]; Sulfa antibiotics; and Erythromycin 3:34 PM                     Olayinka Zheng MD      20  BILLING INFORMATION:                                                                                                     Procedure:       CPT Code Modifier        With Celestina Ceron [facility]                                                                                           H&P AT :      PCP XX____                  URGENT CARE ______    Rene Ruiz    TOTAL HIP REPLACEMENT information. As a matter of trust, it is your duty to keep everything you hear confidential.  Nothing that identifies a participant in any way (including job, ethnicity, Anglican, etc.) can be shared outside of this group setting. I have read and agree to the following statements:        · I agree to meet with a group of patients and my doctor. I understand that I have the choice to be seen by my physician in this group or individually and that I may leave the group visit anytime I feel uncomfortable. · I understand that discussions will occur regarding individual identifiable health information during the shared medical appointment and I will maintain the confidentiality of [facility] health information or other information heard during the appointment. · I am committed to maintaining this confidentiality even if I am no longer participating in shared medical appointments. · I understand that the information I share with the physician and staff will be heard by the other participants and there is a possibility that the information disclosed in the shared medical appointment may be re-disclosed by other participants. I have been notified of this potential disclosure and I voluntarily agree to participate in the shared medical appointment. · I know that I dont have to share any personal information with the group or health care providers unless, I choose to do so. · Like any doctors appointment, I agree to be responsible for the bill and / or co-payment associated with this physician appointment. Shared Medical Appointment              THIS SUPPLEMENTAL NOTICE SUPPLEMENTS AND DOES NOT REPLACE THE [facility] CONSENT, PATIENT RESPONSIBILITY AND NOTICE OF PRIVACY PRACTICE.         Tor Gilbert    1965        Patients Signature: ____________________________________________________         DATE: ____/____/___      Carin Snellen / Support Persons Signature (if applicable) _________________________     DATE: __/__/__    **Each person will be asked to sign this commitment before each Shared Medical Appointment. Thank You ! SURGERY SCHEDULING TIMES                                                                                                                                                      Shania PEDERSON Michaelle Dwain                                                                                       1965                                                                           SURGERY DATE: ___/___/___                                                                      SURGERY TIME:  ___:___ AM/PM                                                                      ARRIVAL TIME:   ___:___  AM/PM                                                                                                                       **REPORT TO THE INFORMATION                                                  DESK IN THE MAIN LOBBY OF [address]  Surgery Scheduler    [facility]) Physicians  [facility]  [address] [place] [address]  [address] [facility] [place], [address] [place] [address]  [employer]    [phone]  Phone

## 2020-09-24 NOTE — PROGRESS NOTES
breastfeeding. Appearance: sitting in exam room chair, appears to be in no acute distress, awake and alert  Resp: unlabored breathing on room air  Skin: warm, dry and intact with out erythema or significant increased temperature  Neuro: grossly intact both lower extremities. Intact sensation to light touch. Motor exam 4+ to 5/5 in all major motor groups. MSK: Right leg -positive Stinchfield. Reduced active and passive range of motion of the hip with pain. Imaging:  AP pelvis and AP and lateral the right hip were performed and reviewed today significant for severe osteoarthritis of the hip joint. Assessment:  Right hip osteoarthritis    Plan:  We discussed the diagnosis and treatment options with the patient. She has significant disability due to her right hip and is prepared to proceed with total hip arthroplasty. We discussed the procedure in detail with her. We went over the risks and complications of joint replacement including; bleeding, infection, decreased ROM, continued pain, instability, fracture, dislocation, neurovascular injury, post op cognitive disorder, leg length discrepancy, DVT, pulmonary embolism and need for further surgical procedures. The patient understands these issues and we will see the patient in the operating room. Plan for right total hip arthroplasty with lateral approach. This dictation was done with Dragon dictation and may contain mechanical errors related to translation.

## 2020-09-28 ENCOUNTER — HOSPITAL ENCOUNTER (OUTPATIENT)
Dept: PHYSICAL THERAPY | Age: 55
Setting detail: THERAPIES SERIES
Discharge: HOME | End: 2020-09-28
Payer: MEDICARE

## 2020-09-28 PROCEDURE — 97162 PT EVAL MOD COMPLEX 30 MIN: CPT

## 2020-09-28 PROCEDURE — 97110 THERAPEUTIC EXERCISES: CPT

## 2020-09-28 ASSESSMENT — PAIN - FUNCTIONAL ASSESSMENT: PAIN_FUNCTIONAL_ASSESSMENT: PREVENTS OR INTERFERES SOME ACTIVE ACTIVITIES AND ADLS

## 2020-09-28 ASSESSMENT — PAIN DESCRIPTION - ORIENTATION: ORIENTATION: RIGHT

## 2020-09-28 ASSESSMENT — PAIN DESCRIPTION - PROGRESSION: CLINICAL_PROGRESSION: GRADUALLY WORSENING

## 2020-09-28 ASSESSMENT — PAIN DESCRIPTION - ONSET: ONSET: ON-GOING

## 2020-09-28 ASSESSMENT — PAIN DESCRIPTION - LOCATION: LOCATION: HIP

## 2020-09-28 ASSESSMENT — PAIN DESCRIPTION - PAIN TYPE: TYPE: CHRONIC PAIN

## 2020-09-28 ASSESSMENT — PAIN SCALES - GENERAL: PAINLEVEL_OUTOF10: 10

## 2020-09-28 ASSESSMENT — PAIN DESCRIPTION - DESCRIPTORS: DESCRIPTORS: SHARP;SHOOTING;CONSTANT

## 2020-09-28 ASSESSMENT — PAIN DESCRIPTION - FREQUENCY: FREQUENCY: CONTINUOUS

## 2020-09-28 NOTE — PROGRESS NOTES
Physical Therapy          TOTAL JOINT - PREHAB ASSESSMENT FORM    Date:  2020    Patient Name:  Bebe Blancas    :  1965  [facility]:[id number]    Restrictions/Precautions: Restrictions/Precautions  Restrictions/Precautions: Fall Risk(No risk of falls)    Pertinent Medical History: Additional Pertinent Hx: PLOF: Independent    Medical/Treatment Diagnosis Information:  · Diagnosis: Primary OA of Right hip, prehab  · Treatment Diagnosis: Pain on right hip, weakness and limited mobility of right hip, difficulty with ADL's    Insurance/Certification information:  PT Insurance Information: Medicare  Physician Information:  Referring Practitioner: Dr. Casey Gayle: [facility]    [x] Total Hip Replacement [x] Right  [] Left  DOS:   [x] Not yet scheduled  [] Total Knee Replacement [] Right  [] Left    [x] Prehab Eval  [] Prehab D/C  [] Post - OP Visit             Weeks from sx [] Post-op D/C    SUBJECTIVE:    Chart Reviewed: Yes  Patient assessed for rehabilitation services?: Yes  Referral Date : 20  Onset Date: 16  Subjective  Subjective: Patient has arthritis in Right hip and she will  have surgery to replace the hip, at a date to be scheduled. Had left THR 4 years ago. Right hip was severely arthritic then, but the left one gave her more pain. She has two grandchildren and she hopes to be active with them again. Pain Screening  Patient Currently in Pain: Yes    DME ASSESSMENT:   Current available equipment:    [x] Std. Duaine [place]       [] Rolling walker      [] 4 wheeled walker     [] Curlene Claude     [x] Straight cane     [] Crutches   [] Reacher            [] Sock Aid              [] Shower chair            [] Leg          [] Long handle shoe horn   [] Other:     Equipment needed at discharge from hospital:   [] Std.  Walker       [x] Rolling walker      [] 4 wheeled walker     [] Curlene Claude     [] Straight cane     [] Crutches   [] Reacher            [] Sock Aid              [] Shower chair Minutes    Signature:  Batool Maxwell, PT

## 2020-09-28 NOTE — PLAN OF CARE
Outpatient Physical Therapy  [] [facility]    Phone: [phone]   Fax: [phone]   [x] [facility]  Phone: [phone]              Fax: [phone]  [] [facility]   Phone: [phone]   Fax: [phone]     To: Referring Practitioner: Dr. Ashley Shepard      Patient: Vinay Hester   : 1965   MRN: [id number]  Evaluation Date: 2020      Diagnosis Information:  · Diagnosis: Primary OA of Right hip, prehab   · Treatment Diagnosis: Pain on right hip, weakness and limited mobility of right hip, difficulty with ADL's     Physical Therapy Certification/Re-Certification Form  Dear Dr. Ashley Shepard,    The following patient has been evaluated for physical therapy services and for therapy to continue, Medicare requires monthly physician review of the treatment plan. Please review the attached evaluation and/or summary of the patient's plan of care, and verify that you agree therapy should continue by signing the attached document and sending it back to our office. Plan of Care/Treatment to date:  [x] Therapeutic Exercise    [] Modalities:  [x] Therapeutic Activity     [] Ultrasound  [] Electrical Stimulation  [x] Gait Training      [] Cervical Traction [] Lumbar Traction  [] Neuromuscular Re-education    [] Cold/hotpack [] Iontophoresis   [x] Instruction in HEP     Other:  [] Manual Therapy      []             [] Aquatic Therapy      []           ? Frequency/Duration:  # Days per week: [x] 1 day # Weeks: [x] 1 week [] 5 weeks     [] 2 days? [] 2 weeks [] 6 weeks     [] 3 days   [] 3 weeks [] 7 weeks     [] 4 days   [] 4 weeks [] 8 weeks    Rehab Potential: [] Excellent [x] Good [] Fair  [] Poor       Electronically signed by:  Arias Horn PT      If you have any questions or concerns, please don't hesitate to call.   Thank you for your referral.      Physician Signature:________________________________Date:__________________  By signing above, therapists plan is approved by physician

## 2020-09-28 NOTE — FLOWSHEET NOTE
Physical Therapy Daily Treatment Note  This will serve as iniital and discharge note. Date:  2020    Patient Name:  Forest Hermosillo    :  1965  MRN: [id number]    Restrictions/Precautions: Restrictions/Precautions  Restrictions/Precautions: Fall Risk(No risk of falls)    Pertinent Medical History: Additional Pertinent Hx: PLOF: Independent    Medical/Treatment Diagnosis Information:  · Diagnosis: Primary OA of Right hip, prehab  · Treatment Diagnosis: Pain on right hip, weakness and limited mobility of right hip, difficulty with ADL's    Insurance/Certification information:  PT Insurance Information: Medicare  Physician Information:  Referring Practitioner: Dr. Davis Tom of care signed (Y/N):  Sent to Dr Adelaide Bunn on 20    Visit# / total visits:    Pain level:  5-10/10     G-Code (if applicable):      Date / Visit # G-Code Applied:  /       Progress Note: []  Yes  [x]  No  Next due by: Visit #10      History of Injury: See below    Subjective:  Subjective  Subjective: Patient has arthritis in Right hip and she will  have surgery to replace the hip, at a date to be scheduled. Had left THR 4 years ago. Right hip was severely arthritic then, but the left one gave her more pain. She has two grandchildren and she hopes to be active with them again. Objective:   Observation:    Test measurements:        Exercises:  Exercise/Equipment Resistance/Repetitions Other comments   Pelvic tilt 10 sec x 10    Heel slides 10x 10    Calf stretch 3 x 30    Seated HS stretch 3 x 30    Quad set 10 x 10    Minisquats 10X    LAQ 10X                                          Other Therapeutic Activities:  Patient was educated on diagnosis, plan of care and prognosis of their complaint. Also, frequency and duration of treatments was discussed. Patient was informed of the attendance policy and issued a copy for their records.    Demonstrated and practiced ambulation with RW on level surfaces and practiced on and ambulation. Timed Code Treatment Minutes: 15   Total Treatment Minutes: 40     [] EVAL (LOW) 73275   [x] EVAL (MOD) 69954   [] EVAL (HIGH) 34669   [] RE-EVAL   [x] TE (40342) x   1   [] NMR (75560)   x    [] Manual (52598) x    [] Ultrasound (55567) x  [] TA (25755) x  [] Mech Traction (52649)  [] Ionto (03567)           [] ES (un) (48665):   [] Other:      Treatment/Activity Tolerance:  [x] Patient tolerated treatment well [] Patient limited by fatigue  [x] Patient limited by pain  [] Patient limited by other medical complications  [] Other:     Prognosis: [x] Good [] Fair  [] Poor    Goals:    Short term goals  Time Frame for Short term goals: 1 visit  Short term goal 1: Pt will be independent with HEP to perform prior to surgery  Short term goal 2: Pt will show safety and independence with rolling walker on level surfaces and with railing on steps        \"No pain , walk normal, be active again\"      Patient Requires Follow-up: [x] Yes  [] No    Plan:   [] Continue per plan of care [] Alter current plan (see comments)  [] Plan of care initiated [] Hold pending MD visit [x] Discharge    Plan for Next Session: Discharge until after surgery. Pt will continue the home exercises until surgery.   Electronically signed by:  Tia Rausch PT,

## 2020-10-05 RX ORDER — HYDROXYCHLOROQUINE SULFATE 200 MG/1
TABLET, FILM COATED ORAL
Qty: 60 TABLET | Refills: 5 | Status: SHIPPED | OUTPATIENT
Start: 2020-10-05 | End: 2021-05-25

## 2020-10-19 ENCOUNTER — HOSPITAL ENCOUNTER (EMERGENCY)
Age: 55
Discharge: HOME | End: 2020-10-19
Payer: MEDICARE

## 2020-10-19 ENCOUNTER — APPOINTMENT (OUTPATIENT)
Dept: GENERAL RADIOLOGY | Age: 55
End: 2020-10-19
Payer: MEDICARE

## 2020-10-19 VITALS
WEIGHT: 284.39 LBS | SYSTOLIC BLOOD PRESSURE: 129 MMHG | HEART RATE: 75 BPM | OXYGEN SATURATION: 99 % | DIASTOLIC BLOOD PRESSURE: 80 MMHG | BODY MASS INDEX: 44.54 KG/M2 | TEMPERATURE: 98.4 F | RESPIRATION RATE: 16 BRPM

## 2020-10-19 PROCEDURE — 6370000000 HC RX 637 (ALT 250 FOR IP): Performed by: NURSE PRACTITIONER

## 2020-10-19 PROCEDURE — 73502 X-RAY EXAM HIP UNI 2-3 VIEWS: CPT

## 2020-10-19 PROCEDURE — 99283 EMERGENCY DEPT VISIT LOW MDM: CPT

## 2020-10-19 RX ORDER — HYDROCODONE BITARTRATE AND ACETAMINOPHEN 5; 325 MG/1; MG/1
2 TABLET ORAL ONCE
Status: COMPLETED | OUTPATIENT
Start: 2020-10-19 | End: 2020-10-19

## 2020-10-19 RX ORDER — CYCLOBENZAPRINE HCL 10 MG
10 TABLET ORAL ONCE
Status: COMPLETED | OUTPATIENT
Start: 2020-10-19 | End: 2020-10-19

## 2020-10-19 RX ORDER — CYCLOBENZAPRINE HCL 10 MG
10 TABLET ORAL 3 TIMES DAILY PRN
Qty: 21 TABLET | Refills: 0 | Status: SHIPPED | OUTPATIENT
Start: 2020-10-19 | End: 2020-10-29

## 2020-10-19 RX ORDER — HYDROCODONE BITARTRATE AND ACETAMINOPHEN 5; 325 MG/1; MG/1
1 TABLET ORAL EVERY 4 HOURS PRN
Qty: 15 TABLET | Refills: 0 | Status: SHIPPED | OUTPATIENT
Start: 2020-10-19 | End: 2020-10-22

## 2020-10-19 RX ADMIN — CYCLOBENZAPRINE 10 MG: 10 TABLET, FILM COATED ORAL at 12:13

## 2020-10-19 RX ADMIN — HYDROCODONE BITARTRATE AND ACETAMINOPHEN 2 TABLET: 5; 325 TABLET ORAL at 10:08

## 2020-10-19 ASSESSMENT — PAIN SCALES - GENERAL
PAINLEVEL_OUTOF10: 10
PAINLEVEL_OUTOF10: 5

## 2020-10-19 ASSESSMENT — PAIN DESCRIPTION - DESCRIPTORS: DESCRIPTORS: ACHING

## 2020-10-19 ASSESSMENT — PAIN - FUNCTIONAL ASSESSMENT
PAIN_FUNCTIONAL_ASSESSMENT: 0-10
PAIN_FUNCTIONAL_ASSESSMENT: PREVENTS OR INTERFERES SOME ACTIVE ACTIVITIES AND ADLS

## 2020-10-19 ASSESSMENT — PAIN DESCRIPTION - ONSET: ONSET: ON-GOING

## 2020-10-19 ASSESSMENT — PAIN DESCRIPTION - ORIENTATION: ORIENTATION: LEFT

## 2020-10-19 ASSESSMENT — PAIN DESCRIPTION - LOCATION: LOCATION: HIP

## 2020-10-19 ASSESSMENT — PAIN DESCRIPTION - PROGRESSION: CLINICAL_PROGRESSION: GRADUALLY WORSENING

## 2020-10-19 ASSESSMENT — PAIN DESCRIPTION - FREQUENCY: FREQUENCY: CONTINUOUS

## 2020-10-19 ASSESSMENT — PAIN DESCRIPTION - PAIN TYPE: TYPE: ACUTE PAIN

## 2020-10-19 NOTE — ED PROVIDER NOTES
[facility]        Pt Name: Tess Rincon  MRN: [id number]  [place] 1965  Date of evaluation: 10/19/2020  Provider: ZACH Chavis CNP  PCP: Jacob Mann DO    BRIGITTE. I have evaluated this patient. My supervising physician was available for consultation. CHIEF COMPLAINT       Chief Complaint   Patient presents with    Hip Pain     left hip pain. NKI   had hip replacement 4 years ago       HISTORY OF PRESENT ILLNESS   (Location, Timing/Onset, Context/Setting, Quality, Duration, Modifying Factors, Severity, Associated Signs and Symptoms)  Note limiting factors. Tess Rincon is a 60 y.o. female with medical history of Sebas's deformity of the right heel, obesity, OA, lumbago, condyloma acuminatum, IBS, neuropathy, panic attacks, chronic back pain, HTN, hypothyroid, left hip arthroplasty presents the ED with complaints of left hip pain. Patient said that she woke up with the pain and has been unable to ambulate since. Says she was attempting to drag her leg whenever she was walking. She then called EMS for patient transport. Says she did not take any medication for the pain prior to arrival.  She denies any recent trauma, accidents, injuries, or falls. Says she does have arthritis and is scheduled to have a right hip replaced by Dr. Adina Brown on 12/2/2020. She did have the left hip replaced 4 years ago due to arthritis. She denies any numbness, tingling, loss of bowel or bladder function, chest pain, abdominal pain, nausea, vomiting, diarrhea. She does note a history of chronic back pain with sciatica and said this feels different as she is having worsening pain with movement of her left hip. Nursing Notes were all reviewed and agreed with or any disagreements were addressed in the HPI.     REVIEW OF SYSTEMS    (2-9 systems for level 4, 10 or more for level 5)     Review of Systems    Positives and Pertinent negatives as per HPI. Except as noted above in the ROS, all other systems were reviewed and negative.        PAST MEDICAL HISTORY     Past Medical History:   Diagnosis Date    Anesthesia complication     severe headache after woke up after wisdom teeth    Chronic back pain     Condyloma acuminatum     Headache(784.0)     Hypertension     Hypothyroidism     IBS (irritable bowel syndrome)     Metrorrhagia     Neuropathy     Osteoarthritis     Panic attack          SURGICAL HISTORY     Past Surgical History:   Procedure Laterality Date    TOTAL HIP ARTHROPLASTY Left 08/16/2016    WISDOM TOOTH EXTRACTION           CURRENTMEDICATIONS       Discharge Medication List as of 10/19/2020 12:19 PM      CONTINUE these medications which have NOT CHANGED    Details   hydroxychloroquine (PLAQUENIL) 200 MG tablet TAKE ONE TABLET BY MOUTH TWICE A DAY, Disp-60 tablet,R-5Normal      triamterene-hydroCHLOROthiazide (DYAZIDE) 37.5-25 MG per capsule TAKE ONE CAPSULE BY MOUTH DAILY, Disp-30 capsule,R-2Normal      oxybutynin (DITROPAN-XL) 10 MG extended release tablet TAKE ONE TABLET BY MOUTH DAILY, Disp-30 tablet,R-2Normal      meloxicam (MOBIC) 15 MG tablet TAKE ONE TABLET BY MOUTH DAILY, Disp-30 tablet,R-2Normal      simvastatin (ZOCOR) 20 MG tablet TAKE ONE TABLET BY MOUTH ONCE NIGHTLY, Disp-30 tablet,R-2Normal      levothyroxine (SYNTHROID) 300 MCG tablet TAKE ONE TABLET BY MOUTH DAILY, Disp-30 LQHROB,M-4TIOEPV      folic acid (FOLVITE) 521 MCG tablet Take 400 mcg by mouth dailyHistorical Med      vitamin D (CHOLECALCIFEROL) 1000 UNIT TABS tablet Take 1,000 Units by mouth dailyHistorical Med      vitamin B-12 (CYANOCOBALAMIN) 1000 MCG tablet Take 1,000 mcg by mouth dailyHistorical Med      Biotin 39907 MCG TABS Take by mouthHistorical Med               ALLERGIES     Glucosamine chondroitin complx [glucosamine-chondroitin]; Sulfa antibiotics; and Erythromycin    FAMILYHISTORY       Family History   Problem HYDROcodone-acetaminophen (NORCO) 5-325 MG per tablet 2 tablet (2 tablets Oral Given 10/19/20 1008)   cyclobenzaprine (FLEXERIL) tablet 10 mg (10 mg Oral Given 10/19/20 1213)             Pertinent Labs & Imaging studies reviewed. (See chart for details)   -  Patient seen and evaluated in the emergency department. -  Triage and nursing notes reviewed and incorporated. -  Old chart records reviewed and incorporated. -  Patient case was not discussed with attending physician, although Dr. Pardeep Coello was available for consultation  -  Differential diagnosis includes:  Dislocation, fracture, sprain, strain, sublux, arthritis, sciatica, bursitis, verse COVID-19  -  Work-up included:  See above x-ray left hip  -  ED treatment included:   Norco, Flexeril  - Consults: None  -  Results discussed with patient. Labs show  x-ray left hip shows no fracture or acute abnormality of the pelvis or left hip. No evidence of complication of the arthroplasty. Severe right hip osteoarthritis. Patient said that she is feeling better after the Norco, however she is requesting a muscle relaxer pill prior to discharge. She does have a ride at the bedside. Pt was given strict return precautions. The patient is agreeable with plan of care and disposition.  -  Disposition:  Home    FINAL IMPRESSION      1. Left hip pain    2.  Arthritis of right hip          DISPOSITION/PLAN   DISPOSITION        PATIENT REFERREDTO:  Ayesha Mejía DO  [address] [facility]  [address] [place] [address]  [phone]    Call in 2 days  As needed, If symptoms worsen    [facility] Emergency Department  [address] [id number] [phone]  Go to   As needed    Soha Perez, [address] [facility] [address]  [address] [place] [address]  [phone]    Call   As needed      DISCHARGE MEDICATIONS:  Discharge Medication List as of 10/19/2020 12:19 PM      START taking these medications    Details HYDROcodone-acetaminophen (NORCO) 5-325 MG per tablet Take 1 tablet by mouth every 4 hours as needed for Pain for up to 3 days. Intended supply: 3 days. Take lowest dose possible to manage pain, Disp-15 tablet,R-0Print      cyclobenzaprine (FLEXERIL) 10 MG tablet Take 1 tablet by mouth 3 times daily as needed for Muscle spasms, Disp-21 tablet,R-0Print             DISCONTINUED MEDICATIONS:  Discharge Medication List as of 10/19/2020 12:19 PM            Periodic Controlled Substance Monitoring: Possible medication side effects, risk of tolerance/dependence & alternative treatments discussed.  (Ana Bush, APRN - CNP)    (Please note that portions of this note were completed with a voice recognition program.  Efforts were made to edit the dictations but occasionally words are mis-transcribed.)    Ana Bush, APRN - CNP (electronically signed)            Ana Bush, APRN - CNP  10/19/20 9331

## 2020-10-19 NOTE — ED NOTES
Bed: [facility]  Expected date:   Expected time:   Means of arrival: [facility]  Comments:  Hip pain     Gabe Kamara RN  10/19/20 6638

## 2020-10-30 RX ORDER — SIMVASTATIN 20 MG
TABLET ORAL
Qty: 30 TABLET | Refills: 1 | Status: SHIPPED | OUTPATIENT
Start: 2020-10-30 | End: 2021-01-28

## 2020-11-09 ENCOUNTER — HOSPITAL ENCOUNTER (OUTPATIENT)
Dept: PREADMISSION TESTING | Age: 55
Discharge: HOME | End: 2020-11-13
Payer: MEDICARE

## 2020-11-09 ENCOUNTER — PREP FOR PROCEDURE (OUTPATIENT)
Dept: ORTHOPEDICS UNIT | Age: 55
End: 2020-11-09

## 2020-11-09 LAB
ABO/RH: NORMAL
ALBUMIN SERPL-MCNC: 4 G/DL (ref 3.4–5)
ANION GAP SERPL CALCULATED.3IONS-SCNC: 13 MMOL/L (ref 3–16)
ANTIBODY SCREEN: NORMAL
APTT: 32.8 SEC (ref 24.2–36.2)
BACTERIA: ABNORMAL /HPF
BASOPHILS ABSOLUTE: 0 K/UL (ref 0–0.2)
BASOPHILS RELATIVE PERCENT: 0.5 %
BILIRUBIN URINE: NEGATIVE
BLOOD, URINE: ABNORMAL
BUN BLDV-MCNC: 19 MG/DL (ref 7–20)
CALCIUM SERPL-MCNC: 9.6 MG/DL (ref 8.3–10.6)
CHLORIDE BLD-SCNC: 99 MMOL/L (ref 99–110)
CLARITY: CLEAR
CO2: 27 MMOL/L (ref 21–32)
COLOR: YELLOW
CREAT SERPL-MCNC: 0.9 MG/DL (ref 0.6–1.1)
EOSINOPHILS ABSOLUTE: 0.1 K/UL (ref 0–0.6)
EOSINOPHILS RELATIVE PERCENT: 1.3 %
EPITHELIAL CELLS, UA: 3 /HPF (ref 0–5)
ESTIMATED AVERAGE GLUCOSE: 145.6 MG/DL
GFR AFRICAN AMERICAN: >60
GFR NON-AFRICAN AMERICAN: >60
GLUCOSE BLD-MCNC: 149 MG/DL (ref 70–99)
GLUCOSE URINE: NEGATIVE MG/DL
HBA1C MFR BLD: 6.7 %
HCT VFR BLD CALC: 45.5 % (ref 36–48)
HEMOGLOBIN: 15.4 G/DL (ref 12–16)
HYALINE CASTS: 1 /LPF (ref 0–8)
INR BLD: 1.03 (ref 0.86–1.14)
KETONES, URINE: NEGATIVE MG/DL
LEUKOCYTE ESTERASE, URINE: NEGATIVE
LYMPHOCYTES ABSOLUTE: 2.1 K/UL (ref 1–5.1)
LYMPHOCYTES RELATIVE PERCENT: 21.7 %
MCH RBC QN AUTO: 31.2 PG (ref 26–34)
MCHC RBC AUTO-ENTMCNC: 33.8 G/DL (ref 31–36)
MCV RBC AUTO: 92.4 FL (ref 80–100)
MICROSCOPIC EXAMINATION: YES
MONOCYTES ABSOLUTE: 0.7 K/UL (ref 0–1.3)
MONOCYTES RELATIVE PERCENT: 7.1 %
NEUTROPHILS ABSOLUTE: 6.6 K/UL (ref 1.7–7.7)
NEUTROPHILS RELATIVE PERCENT: 69.4 %
NITRITE, URINE: NEGATIVE
PDW BLD-RTO: 15 % (ref 12.4–15.4)
PH UA: 6 (ref 5–8)
PLATELET # BLD: 136 K/UL (ref 135–450)
PMV BLD AUTO: 10.3 FL (ref 5–10.5)
POTASSIUM SERPL-SCNC: 4.1 MMOL/L (ref 3.5–5.1)
PREALBUMIN: 28.2 MG/DL (ref 20–40)
PROTEIN UA: NEGATIVE MG/DL
PROTHROMBIN TIME: 11.9 SEC (ref 10–13.2)
RBC # BLD: 4.92 M/UL (ref 4–5.2)
RBC UA: 4 /HPF (ref 0–4)
SEDIMENTATION RATE, ERYTHROCYTE: 19 MM/HR (ref 0–30)
SODIUM BLD-SCNC: 139 MMOL/L (ref 136–145)
SPECIFIC GRAVITY UA: 1.01 (ref 1–1.03)
URINE REFLEX TO CULTURE: ABNORMAL
URINE TYPE: ABNORMAL
UROBILINOGEN, URINE: 0.2 E.U./DL
VITAMIN D 25-HYDROXY: 44.3 NG/ML
WBC # BLD: 9.5 K/UL (ref 4–11)
WBC UA: 1 /HPF (ref 0–5)

## 2020-11-09 PROCEDURE — 84134 ASSAY OF PREALBUMIN: CPT

## 2020-11-09 PROCEDURE — 86901 BLOOD TYPING SEROLOGIC RH(D): CPT

## 2020-11-09 PROCEDURE — 85730 THROMBOPLASTIN TIME PARTIAL: CPT

## 2020-11-09 PROCEDURE — 85610 PROTHROMBIN TIME: CPT

## 2020-11-09 PROCEDURE — 83036 HEMOGLOBIN GLYCOSYLATED A1C: CPT

## 2020-11-09 PROCEDURE — 86850 RBC ANTIBODY SCREEN: CPT

## 2020-11-09 PROCEDURE — 82306 VITAMIN D 25 HYDROXY: CPT

## 2020-11-09 PROCEDURE — 85652 RBC SED RATE AUTOMATED: CPT

## 2020-11-09 PROCEDURE — 85025 COMPLETE CBC W/AUTO DIFF WBC: CPT

## 2020-11-09 PROCEDURE — 86900 BLOOD TYPING SEROLOGIC ABO: CPT

## 2020-11-09 PROCEDURE — 93005 ELECTROCARDIOGRAM TRACING: CPT

## 2020-11-09 PROCEDURE — 81001 URINALYSIS AUTO W/SCOPE: CPT

## 2020-11-09 PROCEDURE — 82040 ASSAY OF SERUM ALBUMIN: CPT

## 2020-11-09 PROCEDURE — 87641 MR-STAPH DNA AMP PROBE: CPT

## 2020-11-09 PROCEDURE — 80048 BASIC METABOLIC PNL TOTAL CA: CPT

## 2020-11-09 RX ORDER — OXYCODONE HYDROCHLORIDE 10 MG/1
10 TABLET ORAL ONCE
Status: CANCELLED | OUTPATIENT
Start: 2020-11-09 | End: 2020-11-09

## 2020-11-10 ENCOUNTER — OFFICE VISIT (OUTPATIENT)
Dept: PRIMARY CARE CLINIC | Age: 55
End: 2020-11-10
Payer: MEDICARE

## 2020-11-10 LAB
EKG ATRIAL RATE: 80 BPM
EKG DIAGNOSIS: NORMAL
EKG P AXIS: 64 DEGREES
EKG P-R INTERVAL: 142 MS
EKG Q-T INTERVAL: 420 MS
EKG QRS DURATION: 88 MS
EKG QTC CALCULATION (BAZETT): 484 MS
EKG R AXIS: 63 DEGREES
EKG T AXIS: 73 DEGREES
EKG VENTRICULAR RATE: 80 BPM
MRSA SCREEN RT-PCR: NORMAL

## 2020-11-10 PROCEDURE — 93010 ELECTROCARDIOGRAM REPORT: CPT | Performed by: INTERNAL MEDICINE

## 2020-11-10 PROCEDURE — 99211 OFF/OP EST MAY X REQ PHY/QHP: CPT | Performed by: NURSE PRACTITIONER

## 2020-11-10 NOTE — PROGRESS NOTES
Patient presented to [facility] drive up clinic for preop testing. Patient was swabbed and given information advising them to remain isolated until procedure date.

## 2020-11-11 ENCOUNTER — OFFICE VISIT (OUTPATIENT)
Dept: FAMILY MEDICINE CLINIC | Age: 55
End: 2020-11-11
Payer: MEDICARE

## 2020-11-11 VITALS
DIASTOLIC BLOOD PRESSURE: 72 MMHG | BODY MASS INDEX: 40.81 KG/M2 | HEART RATE: 82 BPM | WEIGHT: 260 LBS | SYSTOLIC BLOOD PRESSURE: 120 MMHG | HEIGHT: 67 IN | OXYGEN SATURATION: 96 % | TEMPERATURE: 98.6 F

## 2020-11-11 PROBLEM — L98.491 SKIN ULCER, LIMITED TO BREAKDOWN OF SKIN (HCC): Status: ACTIVE | Noted: 2020-11-11

## 2020-11-11 LAB — SARS-COV-2: NOT DETECTED

## 2020-11-11 PROCEDURE — G8417 CALC BMI ABV UP PARAM F/U: HCPCS | Performed by: FAMILY MEDICINE

## 2020-11-11 PROCEDURE — G8427 DOCREV CUR MEDS BY ELIG CLIN: HCPCS | Performed by: FAMILY MEDICINE

## 2020-11-11 PROCEDURE — 99214 OFFICE O/P EST MOD 30 MIN: CPT | Performed by: FAMILY MEDICINE

## 2020-11-11 PROCEDURE — 3017F COLORECTAL CA SCREEN DOC REV: CPT | Performed by: FAMILY MEDICINE

## 2020-11-11 PROCEDURE — 4004F PT TOBACCO SCREEN RCVD TLK: CPT | Performed by: FAMILY MEDICINE

## 2020-11-11 PROCEDURE — G8484 FLU IMMUNIZE NO ADMIN: HCPCS | Performed by: FAMILY MEDICINE

## 2020-11-11 ASSESSMENT — PATIENT HEALTH QUESTIONNAIRE - PHQ9
SUM OF ALL RESPONSES TO PHQ QUESTIONS 1-9: 1
2. FEELING DOWN, DEPRESSED OR HOPELESS: 1
SUM OF ALL RESPONSES TO PHQ9 QUESTIONS 1 & 2: 1
1. LITTLE INTEREST OR PLEASURE IN DOING THINGS: 0

## 2020-11-11 ASSESSMENT — ENCOUNTER SYMPTOMS
RESPIRATORY NEGATIVE: 1
BACK PAIN: 1
GASTROINTESTINAL NEGATIVE: 1

## 2020-11-11 NOTE — PROGRESS NOTES
Subjective:      Patient ID: Lalita Bob is a 60 y.o. female. HPI  Right hip replacment   11/16/20  Dr Sims Ends  djd and pain  Loss of mobility  Review of Systems   Constitutional: Negative. Respiratory: Negative. Cardiovascular: Negative. Gastrointestinal: Negative. Musculoskeletal: Positive for arthralgias, back pain, gait problem and myalgias. Skin: Negative. YOB: 1965    Date of Visit:  11/11/2020    Allergies   Allergen Reactions    Glucosamine Chondroitin Complx [Glucosamine-Chondroitin] Swelling     \"Due to sulfa allergy\" per pt    Sulfa Antibiotics Swelling and Other (See Comments)     Pt states gets really hot like she is on fire,and swelling of face,hands, and feet    Erythromycin Swelling       Outpatient Medications Marked as Taking for the 11/11/20 encounter (Office Visit) with Ap Cifuentes, DO   Medication Sig Dispense Refill    simvastatin (ZOCOR) 20 MG tablet TAKE ONE TABLET BY MOUTH ONCE NIGHTLY 30 tablet 1    hydroxychloroquine (PLAQUENIL) 200 MG tablet TAKE ONE TABLET BY MOUTH TWICE A DAY 60 tablet 5    triamterene-hydroCHLOROthiazide (DYAZIDE) 37.5-25 MG per capsule TAKE ONE CAPSULE BY MOUTH DAILY 30 capsule 2    oxybutynin (DITROPAN-XL) 10 MG extended release tablet TAKE ONE TABLET BY MOUTH DAILY 30 tablet 2    meloxicam (MOBIC) 15 MG tablet TAKE ONE TABLET BY MOUTH DAILY 30 tablet 2    levothyroxine (SYNTHROID) 300 MCG tablet TAKE ONE TABLET BY MOUTH DAILY 30 tablet 2    folic acid (FOLVITE) 168 MCG tablet Take 400 mcg by mouth daily      vitamin D (CHOLECALCIFEROL) 1000 UNIT TABS tablet Take 1,000 Units by mouth daily      vitamin B-12 (CYANOCOBALAMIN) 1000 MCG tablet Take 1,000 mcg by mouth daily      Biotin 98197 MCG TABS Take by mouth         Vitals:    11/11/20 1302   BP: 120/72   Pulse: 82   Temp: 98.6 °F (37 °C)   SpO2: 96%   Weight: 260 lb (117.9 kg)   Height: 5' 7\" (1.702 m)     Body mass index is 40.72 kg/m².      Wt Readings from Last 3 Encounters:   11/11/20 260 lb (117.9 kg)   10/19/20 284 lb 6.3 oz (129 kg)   09/24/20 272 lb (123.4 kg)     BP Readings from Last 3 Encounters:   11/11/20 120/72   10/19/20 129/80   10/18/19 136/88     Allergies   Allergen Reactions    Glucosamine Chondroitin Complx [Glucosamine-Chondroitin] Swelling     \"Due to sulfa allergy\" per pt    Sulfa Antibiotics Swelling and Other (See Comments)     Pt states gets really hot like she is on fire,and swelling of face,hands, and feet    Erythromycin Swelling     Current Outpatient Medications   Medication Sig Dispense Refill    simvastatin (ZOCOR) 20 MG tablet TAKE ONE TABLET BY MOUTH ONCE NIGHTLY 30 tablet 1    hydroxychloroquine (PLAQUENIL) 200 MG tablet TAKE ONE TABLET BY MOUTH TWICE A DAY 60 tablet 5    triamterene-hydroCHLOROthiazide (DYAZIDE) 37.5-25 MG per capsule TAKE ONE CAPSULE BY MOUTH DAILY 30 capsule 2    oxybutynin (DITROPAN-XL) 10 MG extended release tablet TAKE ONE TABLET BY MOUTH DAILY 30 tablet 2    meloxicam (MOBIC) 15 MG tablet TAKE ONE TABLET BY MOUTH DAILY 30 tablet 2    levothyroxine (SYNTHROID) 300 MCG tablet TAKE ONE TABLET BY MOUTH DAILY 30 tablet 2    folic acid (FOLVITE) 332 MCG tablet Take 400 mcg by mouth daily      vitamin D (CHOLECALCIFEROL) 1000 UNIT TABS tablet Take 1,000 Units by mouth daily      vitamin B-12 (CYANOCOBALAMIN) 1000 MCG tablet Take 1,000 mcg by mouth daily      Biotin 52680 MCG TABS Take by mouth       No current facility-administered medications for this visit.       Past Medical History:   Diagnosis Date    Anesthesia complication     severe headache after woke up after wisdom teeth    Chronic back pain     Condyloma acuminatum     Headache(784.0)     Hypertension     Hypothyroidism     IBS (irritable bowel syndrome)     Metrorrhagia     Neuropathy     Osteoarthritis     Panic attack      Past Surgical History:   Procedure Laterality Date    TOTAL HIP ARTHROPLASTY Left 08/16/2016    WISDOM TOOTH EXTRACTION       Social History     Tobacco Use    Smoking status: Current Every Day Smoker     Packs/day: 1.00     Years: 30.00     Pack years: 30.00     Types: Cigarettes    Smokeless tobacco: Never Used    Tobacco comment: encouraged to stop smoking    Substance Use Topics    Alcohol use: Yes     Alcohol/week: 0.0 standard drinks     Comment: rare    Drug use: No     Family History   Problem Relation Age of Onset    Diabetes Other     High Blood Pressure Other     Stroke Other     Cancer Other         Bone    Alcohol Abuse Other     Heart Disease Mother         cardiac arrhythmia    High Blood Pressure Mother     Thyroid Disease Mother     Arthritis Father     Heart Disease Father         cabg    Diabetes Father     Stroke Father     Cancer Sister         breast cancer    Cancer Brother         lung       Objective:   Physical Exam  Vitals signs and nursing note reviewed. Constitutional:       Appearance: She is well-developed. HENT:      Head: Normocephalic. Neck:      Thyroid: No thyromegaly. Cardiovascular:      Rate and Rhythm: Normal rate and regular rhythm. Heart sounds: Normal heart sounds. Pulmonary:      Effort: Pulmonary effort is normal.      Breath sounds: Normal breath sounds. Lymphadenopathy:      Cervical: No cervical adenopathy. Neurological:      Mental Status: She is alert and oriented to person, place, and time. Psychiatric:         Behavior: Behavior normal.         Thought Content:  Thought content normal.         Judgment: Judgment normal.         Assessment:      Assessment/plan;  Alec Guadarrama was seen today for pre-op exam.    Diagnoses and all orders for this visit:    Preop examination    Primary osteoarthritis of right hip    Class 2 severe obesity due to excess calories with serious comorbidity and body mass index (BMI) of 37.0 to 37.9 in adult [facility])      Pt medically clear for surgery  Alyse Michaud DO

## 2020-11-12 ENCOUNTER — OFFICE VISIT (OUTPATIENT)
Dept: ORTHOPEDIC SURGERY | Age: 55
End: 2020-11-12

## 2020-11-12 ENCOUNTER — TELEPHONE (OUTPATIENT)
Dept: ORTHOPEDIC SURGERY | Age: 55
End: 2020-11-12

## 2020-11-12 PROCEDURE — 99999 PR OFFICE/OUTPT VISIT,PROCEDURE ONLY: CPT | Performed by: ORTHOPAEDIC SURGERY

## 2020-11-12 NOTE — PROGRESS NOTES
PRE-OP INSTRUCTIONS     · Do not eat or drink anything after 12:00 midnight prior to surgery. · This includes water, chewing gum, mints and ice chips. · You may brush your teeth and gargle the morning of surgery but DO  NOT SWALLOW THE WATER. Take the following medications with a small sip of water on the morning of procedure. .. Follow your MD/Surgeons pre-procedure instructions regarding your medications    · You may be asked to stop blood thinners such as:  Coumadin, Plavix, Fragmin and lovenox. Please check with your doctor before stopping these or any other medications. · Aspirin, ibuprofen, advil and naproxen, any anti-inflammatory products should be stopped for a week prior to your surgery. · Do not smoke and do not drink any alcoholic beverages 24 hours prior to your surgery. · Please do not wear any jewelry or body piercings on the day of surgery. · Please wear something simple, loose fitting clothing to the hospital.  Do not wear any make-up(including eye make-up) or nail polish on your fingers and toes. As part of our patient safety program to minimize surgical infections, we ask you to do the following:    Please notify your surgeon if you develop any illness between now and the day of your surgery. This includes a cough, cold, fever, sore  throat, nausea, vomiting, diarrhea, etc.   Please notify your surgeon if you experience dizziness, shortness of  breath or blurred vision between now and the time of your surgery. Please notify your surgeon of any open or redden areas that may look infected       DO NOT shave your operative site 96 hours(four days) prior to surgery. Shower the week before surgery with an antibacterial soap such as:dial,safeguard, etc.       Three(3) days prior to your surgery, cleanse the operative site with Hibiclens(anti-microbial soap).  This soap may dry your skin, please do not apply any oils or lotions     · Please bring your insurance card and picture ID day of surgery    · If you have a living will or durable power of attorny. Please bring in a copy of you advanced directives. · If you have dentures, they will be removed before going to the OR, we will provide you with a container. If you wear contact lenses/glasses, they will be removes, please bring a case    · Have you seen your family doctor for a pre-op history and physical.      · Surgery scheduler will call you 48 hours prior to your surgery to notify you of the time of your surgery and the time you will need to be at hospital...patients are asked to arrive 2 1/2 hours prior to surgery. ·  Please call Pre-Admission testing if you have any further questions.                   [id number] [facility] testing phone number:  [phone]      Thank You for choosing [facility]!!

## 2020-11-12 NOTE — PROGRESS NOTES
Phone message left to call PAT dept at [phone]  for history review and surgery instructions on 11/12/20 @ [phone]

## 2020-11-12 NOTE — PROGRESS NOTES

## 2020-11-12 NOTE — PROGRESS NOTES
Leonela Leslie is here to discuss surgical options. I had a 45 minute group discussion with greater than 50% of the time counseling the patient regarding joint arthroplasty, its preoperative evaluation, and post operative physical therapy, and pain managaement We went over the surgical procedure of hip replacement risks and complications of hip replacement including bleeding, infection, fracture, dislocation, instability, persistent pain, leg length discrepancy, neurovascular injury,  DVT, pulmonary embolism, post operative cognitive disorders, and need for revision. We also discussed the choice of implant and the hardware model was reviewed with the patient. She  understands these and we will see the patient in the operating room. LMP 01/27/2015       As this patient has demonstrated risk factors for osteoporosis, such as age greater than [de-identified] years and evidence of a fracture, I have referred the patient back to the primary care physician for evaluation for osteoporosis, including consideration for DEXA scanning, if this is felt to be clinically indicated. The patient is advised to contact the primary care physician to follow-up for further evaluation. Plan:  right total hip arthroplasty to be tenatively performed on 11/16/20. Please inform the patient's primary care provider.

## 2020-11-12 NOTE — DISCHARGE INSTR - COC
[facility] ([facility]) Continuity of Care Form    Patient Name:  Mellisa Fabian  : 1965    MRN:  [id number]    Admit date:  (Not on file)  Discharge date:  2020    Code Status Order: Prior  Advance Directives: No    Admitting Physician: Karo Riddle MD  PCP: Greg Hutson DO    Discharging Nurse: [facility] Unit/Room#: No information available for this encounter. Discharging Unit Phone Number: [phone]    Emergency Contact:        Past Surgical History:  Past Surgical History:   Procedure Laterality Date    TOTAL HIP ARTHROPLASTY Left 2016    WISDOM TOOTH EXTRACTION         Immunization History:   Immunization History   Administered Date(s) Administered    Influenza Virus Vaccine 10/02/2020    Influenza, Quadv, IM, PF (6 mo and older Fluzone, Flulaval, Fluarix, and 3 yrs and older Afluria) 10/18/2019    Pneumococcal Conjugate 13-valent ([id number]) 2018    Tdap (Boostrix, Adacel) 2004, 2016       Active Problems:  Active Problems:    * No active hospital problems. *  Resolved Problems:    * No resolved hospital problems. *      Isolation/Infection:       Nurse Assessment:  Last Vital Signs:LMP 2015   Last documented pain score (0-10 scale):    Last Weight:   Wt Readings from Last 1 Encounters:   20 260 lb (117.9 kg)     Mental Status:  oriented and alert     IV Access:  - None    Nursing Mobility/ADLs:  Walking   Assisted  Transfer  Assisted  Bathing  Assisted  Dressing  Assisted  Toileting  [facility] Delivery   whole    Wound Care Documentation and Therapy:  Keep glued Prineo dressing intact. DO NOT remove. This is waterproof for showering. Doctor will evaluate wound at office visit 2 weeks after surgery to discuss removal.     Incision 16 Hip Left (Active)   Number of days: 4399          Elimination:  Urinary Catheter: None   Colostomy/Ileostomy: No  Continence:   · Bowel:  Yes  · Bladder: no at times  Date of Last BM: 11/15/20    Intake/Output Summary (Last 24 hours)   No intake or output data in the 24 hours ending 11/12/20 1224  Safety Concerns: At Risk for Falls    Impairments/Disabilities:      None    Nutrition Therapy:  Current Nutrition Therapy: No diet orders on file  Routes of Feeding: Oral  Liquids: No Restrictions  Daily Fluid Restriction: no  Last Modified Barium Swallow with Video (Video Swallowing Test): not done    Treatments at the Time of Hospital Discharge:   Respiratory Treatments:   Oxygen Therapy:  is not on home oxygen therapy. Ventilator:    - No ventilator support    Lab orders for discharge:        Rehab Therapies: Physical Therapy, Occupational Therapy   Weight Bearing Status/Restrictions: No weight bearing restrictions, anterior hip precautions, NO straight leg raises  Other Medical Equipment (for information only, NOT a DME order):  Rolling walker  Other Treatments: Eliquis bid for 30 total days after discharge from hospital for DVT prophylaxis , bilateral knee high SHELBIE hose for 2 weeks after surgery    Patient's personal belongings (please select all that are sent with patient):  None    RN SIGNATURE:  Electronically signed by Miller Downs RN on 11/16/20 at 4:46 PM EST    PHYSICIAN SECTION    Prognosis: Good    Condition at Discharge: Stable    Rehab Potential (if transferring to Rehab): Good    Physician Certification: I certify the above orders, information, and transfer of Marita Williamson is necessary for the continuing treatment of the diagnosis listed and that he requires {Admit to Appropriate Level of Care:18513:::0} for {GREATER/LESS:364897902} 30 days.      Update Admission H&P: No change in H&P    PHYSICIAN SIGNATURE:  Electronically signed by ZACH Fry CNP on 11/16/20 at 12:25 PM EST/ Dr Robbert Blamer Dr Meryle Ellison in office 2 weeks after surgery   [facility] and Sports Medicine, [address] [place] [address], [address] [place] [address], [phone]    CASE MANAGEMENT/SOCIAL WORK SECTION    Inpatient Status Date: 11/16/2020    [facility] Readmission Risk Assessment Score:    Discharging to Facility/ Agency   Name:  [facility] care    Address: [address] [place] [address], [address] [place] [address]  Phone: [phone]  Fax: [phone]    / signature: Electronically signed by Ca Castillo on 11/17/20 at 9:56 AM EST  Activity:   Elevate your leg if swelling occurs in your ankle. Use elastic wraps/hose until swelling decreases.  Continue the exercise program as prescribed by physical therapists.  Take frequent walks.  Use walker, crutches, or cane with weight bearing instructions as indicated by the physical therapists.  Take rest periods often. Elevate leg during rest period.  Avoid sitting in low chairs or toilets without raised seats.  Keep knees apart. Sleep with a pillow between your legs.  Do not cross your legs, especially when putting on shoes and socks. Wound Care:   Cover the wound with a sterile gauze dressing and change daily as long as there is drainage.  Do not scrub wound. Pat it dry with a soft towel.  Dont apply any lotions or creams to your wound.  Check the incision every day for redness, swelling, or increase in drainage. Diet:   You can resume your normal diet. There are no limits on your diet due to your surgery.  Pain pills and activity changes may lead to constipation. To prevent this, use prune juice or bran cereals liberally. You may need to use a laxative such as Dulcolax, Senokot, or Milk of Magnesia.  Drink plenty of fluids. Medications:   Take pain pills if needed to maintain comfort.  Never drive while taking pain medicine.  Avoid over the counter medications until checking with your doctor.  Resume previous medications as instructed by your doctor. You will be on Aspirin or Eliquis  for 30 days only.   Stay off other anti-inflammatory medications (except Celebrex) while on blood thinners  Call Your Doctor If:  Juancarlos Fernandez have increased pain not controlled by medications.  Excessive swelling in your ankle.  You develop numbness, tingling, or decreased movement.  You have a fever greater than 100 degrees for a day or over 101 degrees at any one time.  Your wound becomes more reddened, starts draining, or opens.  If you fall. You have any questions about your recovery. ? Inform your family doctor/dentist or any other doctor who cares for you in the future that you have a joint replacement. You may need antibiotics for dental or surgical procedures if there is any evidence of infection present. ?  If you have required the use of insulin to control your blood sugar after surgery, follow up with your family doctor. ? Call your surgeons office to schedule your appointment to be seen after surgery. ? Make your appointment to continue physical therapy per doctors orders. ? Smoking cessation assistance can be obtained from your family doctor or by [address] @ [phone]    _______________________________   _____   _______________________  ____                Patient Signature              Date      Witness                               Date          Anterior Approach  The main positions and movements to avoid after an anterior approach include bending the hip back, turning your hip and leg out, or spreading your leg outward.  Don't stretch your hip back. Walk with short steps. Taking a longer step when leading with your nonoperated hip stretches the surgical hip back.  Don't kneel only on one knee. Kneeling only on the surgical hip stretches the hip back. Use both knees when you must kneel down.  Don't turn your foot out. Place a pillow next to your hip and leg to keep your leg from turning or rolling out while lying on your back in bed.  Don't twist your body away from your operated hip.  This means don't stand with your toes pointed out. Keep the toes of your affected leg pointed forward when you stand, sit, or walk. If you turn your body away from your surgical hip without pivoting your foot, your hip will be placed in an unsafe position. Remember to lift and turn your foot as you turn.  Don't swing your leg outward away from your body. This means scooting to the side in bed by supporting your surgical leg.  Don't put your leg in a straddling position, as though you are mounting a horse. This means preventing your leg from bending up and out when getting in or out of the bathtub.  Instead, hold your leg, and lift it straight up and over the edge of the tub

## 2020-11-13 ENCOUNTER — ANESTHESIA EVENT (OUTPATIENT)
Dept: OPERATING ROOM | Age: 55
DRG: 470 | End: 2020-11-13
Payer: MEDICARE

## 2020-11-13 NOTE — TELEPHONE ENCOUNTER
Spoke with Tomeka Foster, She said all Medicare total hips should be scheduled as outpatient with 23 hour hold.   I gave her permission to change all the Medicare total hips to outpatient

## 2020-11-16 ENCOUNTER — APPOINTMENT (OUTPATIENT)
Dept: GENERAL RADIOLOGY | Age: 55
DRG: 470 | End: 2020-11-16
Attending: ORTHOPAEDIC SURGERY
Payer: MEDICARE

## 2020-11-16 ENCOUNTER — HOSPITAL ENCOUNTER (INPATIENT)
Age: 55
LOS: 2 days | Discharge: HOME | DRG: 470 | End: 2020-11-18
Attending: ORTHOPAEDIC SURGERY | Admitting: ORTHOPAEDIC SURGERY
Payer: MEDICARE

## 2020-11-16 ENCOUNTER — ANESTHESIA (OUTPATIENT)
Dept: OPERATING ROOM | Age: 55
DRG: 470 | End: 2020-11-16
Payer: MEDICARE

## 2020-11-16 VITALS
DIASTOLIC BLOOD PRESSURE: 55 MMHG | SYSTOLIC BLOOD PRESSURE: 101 MMHG | RESPIRATION RATE: 2 BRPM | OXYGEN SATURATION: 97 % | TEMPERATURE: 97.3 F

## 2020-11-16 PROBLEM — M16.11 ARTHRITIS OF RIGHT HIP: Status: ACTIVE | Noted: 2020-11-16

## 2020-11-16 LAB
ABO/RH: NORMAL
ANTIBODY SCREEN: NORMAL
GLUCOSE BLD-MCNC: 167 MG/DL (ref 70–99)
GLUCOSE BLD-MCNC: 182 MG/DL (ref 70–99)
GLUCOSE BLD-MCNC: 195 MG/DL (ref 70–99)
PERFORMED ON: ABNORMAL

## 2020-11-16 PROCEDURE — 2500000003 HC RX 250 WO HCPCS: Performed by: NURSE ANESTHETIST, CERTIFIED REGISTERED

## 2020-11-16 PROCEDURE — 94150 VITAL CAPACITY TEST: CPT

## 2020-11-16 PROCEDURE — 73502 X-RAY EXAM HIP UNI 2-3 VIEWS: CPT

## 2020-11-16 PROCEDURE — 2580000003 HC RX 258: Performed by: ORTHOPAEDIC SURGERY

## 2020-11-16 PROCEDURE — 3209999900 FLUORO FOR SURGICAL PROCEDURES

## 2020-11-16 PROCEDURE — 6360000002 HC RX W HCPCS: Performed by: ORTHOPAEDIC SURGERY

## 2020-11-16 PROCEDURE — 7100000000 HC PACU RECOVERY - FIRST 15 MIN: Performed by: ORTHOPAEDIC SURGERY

## 2020-11-16 PROCEDURE — 97162 PT EVAL MOD COMPLEX 30 MIN: CPT

## 2020-11-16 PROCEDURE — 6370000000 HC RX 637 (ALT 250 FOR IP): Performed by: NURSE PRACTITIONER

## 2020-11-16 PROCEDURE — 2720000010 HC SURG SUPPLY STERILE: Performed by: ORTHOPAEDIC SURGERY

## 2020-11-16 PROCEDURE — 88305 TISSUE EXAM BY PATHOLOGIST: CPT

## 2020-11-16 PROCEDURE — 6370000000 HC RX 637 (ALT 250 FOR IP): Performed by: ORTHOPAEDIC SURGERY

## 2020-11-16 PROCEDURE — 86900 BLOOD TYPING SEROLOGIC ABO: CPT

## 2020-11-16 PROCEDURE — 1200000000 HC SEMI PRIVATE

## 2020-11-16 PROCEDURE — 2500000003 HC RX 250 WO HCPCS: Performed by: ORTHOPAEDIC SURGERY

## 2020-11-16 PROCEDURE — 97116 GAIT TRAINING THERAPY: CPT

## 2020-11-16 PROCEDURE — 2709999900 HC NON-CHARGEABLE SUPPLY: Performed by: ORTHOPAEDIC SURGERY

## 2020-11-16 PROCEDURE — C1776 JOINT DEVICE (IMPLANTABLE): HCPCS | Performed by: ORTHOPAEDIC SURGERY

## 2020-11-16 PROCEDURE — 88311 DECALCIFY TISSUE: CPT

## 2020-11-16 PROCEDURE — 6360000002 HC RX W HCPCS: Performed by: NURSE ANESTHETIST, CERTIFIED REGISTERED

## 2020-11-16 PROCEDURE — 3700000000 HC ANESTHESIA ATTENDED CARE: Performed by: ORTHOPAEDIC SURGERY

## 2020-11-16 PROCEDURE — 6360000002 HC RX W HCPCS: Performed by: ANESTHESIOLOGY

## 2020-11-16 PROCEDURE — 0SR90J9 REPLACEMENT OF RIGHT HIP JOINT WITH SYNTHETIC SUBSTITUTE, CEMENTED, OPEN APPROACH: ICD-10-PCS | Performed by: ORTHOPAEDIC SURGERY

## 2020-11-16 PROCEDURE — 2580000003 HC RX 258: Performed by: ANESTHESIOLOGY

## 2020-11-16 PROCEDURE — 3600000015 HC SURGERY LEVEL 5 ADDTL 15MIN: Performed by: ORTHOPAEDIC SURGERY

## 2020-11-16 PROCEDURE — 3600000005 HC SURGERY LEVEL 5 BASE: Performed by: ORTHOPAEDIC SURGERY

## 2020-11-16 PROCEDURE — 3700000001 HC ADD 15 MINUTES (ANESTHESIA): Performed by: ORTHOPAEDIC SURGERY

## 2020-11-16 PROCEDURE — 86901 BLOOD TYPING SEROLOGIC RH(D): CPT

## 2020-11-16 PROCEDURE — 86850 RBC ANTIBODY SCREEN: CPT

## 2020-11-16 PROCEDURE — 94760 N-INVAS EAR/PLS OXIMETRY 1: CPT

## 2020-11-16 PROCEDURE — 7100000001 HC PACU RECOVERY - ADDTL 15 MIN: Performed by: ORTHOPAEDIC SURGERY

## 2020-11-16 DEVICE — CUP ACET DIA56MM HIP GRIPTION PRI CEMENTLESS FIX SECT SER: Type: IMPLANTABLE DEVICE | Site: HIP | Status: FUNCTIONAL

## 2020-11-16 DEVICE — STEM FEM SZ 5 L105MM 12/14 TAPR HI OFFSET HIP DUOFIX CLLRD: Type: IMPLANTABLE DEVICE | Site: HIP | Status: FUNCTIONAL

## 2020-11-16 DEVICE — HEAD FEM DIA40MM +5MM OFFSET 12/14 TAPR HIP CERAMIC TI SL: Type: IMPLANTABLE DEVICE | Site: HIP | Status: FUNCTIONAL

## 2020-11-16 DEVICE — IMPLANTABLE DEVICE: Type: IMPLANTABLE DEVICE | Site: HIP | Status: FUNCTIONAL

## 2020-11-16 RX ORDER — SENNA AND DOCUSATE SODIUM 50; 8.6 MG/1; MG/1
1 TABLET, FILM COATED ORAL 2 TIMES DAILY
Status: DISCONTINUED | OUTPATIENT
Start: 2020-11-16 | End: 2020-11-18 | Stop reason: HOSPADM

## 2020-11-16 RX ORDER — DEXTROSE MONOHYDRATE 50 MG/ML
100 INJECTION, SOLUTION INTRAVENOUS PRN
Status: DISCONTINUED | OUTPATIENT
Start: 2020-11-16 | End: 2020-11-18 | Stop reason: HOSPADM

## 2020-11-16 RX ORDER — MORPHINE SULFATE 2 MG/ML
2 INJECTION, SOLUTION INTRAMUSCULAR; INTRAVENOUS
Status: DISCONTINUED | OUTPATIENT
Start: 2020-11-16 | End: 2020-11-18

## 2020-11-16 RX ORDER — SODIUM CHLORIDE 0.9 % (FLUSH) 0.9 %
10 SYRINGE (ML) INJECTION EVERY 12 HOURS SCHEDULED
Status: DISCONTINUED | OUTPATIENT
Start: 2020-11-16 | End: 2020-11-18 | Stop reason: HOSPADM

## 2020-11-16 RX ORDER — SODIUM CHLORIDE 0.9 % (FLUSH) 0.9 %
10 SYRINGE (ML) INJECTION PRN
Status: DISCONTINUED | OUTPATIENT
Start: 2020-11-16 | End: 2020-11-16 | Stop reason: HOSPADM

## 2020-11-16 RX ORDER — SIMVASTATIN 10 MG
20 TABLET ORAL NIGHTLY
Status: DISCONTINUED | OUTPATIENT
Start: 2020-11-16 | End: 2020-11-18 | Stop reason: HOSPADM

## 2020-11-16 RX ORDER — NICOTINE POLACRILEX 4 MG
15 LOZENGE BUCCAL PRN
Status: DISCONTINUED | OUTPATIENT
Start: 2020-11-16 | End: 2020-11-18 | Stop reason: HOSPADM

## 2020-11-16 RX ORDER — OXYCODONE HYDROCHLORIDE 5 MG/1
5 TABLET ORAL PRN
Status: DISCONTINUED | OUTPATIENT
Start: 2020-11-16 | End: 2020-11-16 | Stop reason: HOSPADM

## 2020-11-16 RX ORDER — MIDAZOLAM HYDROCHLORIDE 1 MG/ML
INJECTION INTRAMUSCULAR; INTRAVENOUS PRN
Status: DISCONTINUED | OUTPATIENT
Start: 2020-11-16 | End: 2020-11-16 | Stop reason: SDUPTHER

## 2020-11-16 RX ORDER — TRIAMTERENE AND HYDROCHLOROTHIAZIDE 75; 50 MG/1; MG/1
1 TABLET ORAL DAILY
Status: DISCONTINUED | OUTPATIENT
Start: 2020-11-17 | End: 2020-11-18 | Stop reason: HOSPADM

## 2020-11-16 RX ORDER — SODIUM CHLORIDE 9 MG/ML
INJECTION, SOLUTION INTRAVENOUS CONTINUOUS
Status: DISCONTINUED | OUTPATIENT
Start: 2020-11-16 | End: 2020-11-16

## 2020-11-16 RX ORDER — ONDANSETRON 2 MG/ML
4 INJECTION INTRAMUSCULAR; INTRAVENOUS EVERY 6 HOURS PRN
Status: DISCONTINUED | OUTPATIENT
Start: 2020-11-16 | End: 2020-11-18 | Stop reason: HOSPADM

## 2020-11-16 RX ORDER — OXYCODONE HYDROCHLORIDE 5 MG/1
5-10 TABLET ORAL EVERY 6 HOURS PRN
Qty: 40 TABLET | Refills: 0 | Status: SHIPPED | OUTPATIENT
Start: 2020-11-16 | End: 2020-11-21

## 2020-11-16 RX ORDER — SUCCINYLCHOLINE/SOD CL,ISO/PF 200MG/10ML
SYRINGE (ML) INTRAVENOUS PRN
Status: DISCONTINUED | OUTPATIENT
Start: 2020-11-16 | End: 2020-11-16 | Stop reason: SDUPTHER

## 2020-11-16 RX ORDER — OXYCODONE HYDROCHLORIDE 10 MG/1
10 TABLET ORAL PRN
Status: DISCONTINUED | OUTPATIENT
Start: 2020-11-16 | End: 2020-11-16 | Stop reason: HOSPADM

## 2020-11-16 RX ORDER — CYCLOBENZAPRINE HCL 10 MG
5 TABLET ORAL 3 TIMES DAILY PRN
Status: DISCONTINUED | OUTPATIENT
Start: 2020-11-16 | End: 2020-11-18 | Stop reason: HOSPADM

## 2020-11-16 RX ORDER — OXYCODONE HYDROCHLORIDE 10 MG/1
10 TABLET ORAL EVERY 4 HOURS PRN
Status: DISCONTINUED | OUTPATIENT
Start: 2020-11-16 | End: 2020-11-18 | Stop reason: HOSPADM

## 2020-11-16 RX ORDER — FENTANYL CITRATE 50 UG/ML
25 INJECTION, SOLUTION INTRAMUSCULAR; INTRAVENOUS EVERY 5 MIN PRN
Status: DISCONTINUED | OUTPATIENT
Start: 2020-11-16 | End: 2020-11-16 | Stop reason: HOSPADM

## 2020-11-16 RX ORDER — ACETAMINOPHEN 325 MG/1
650 TABLET ORAL EVERY 4 HOURS PRN
Status: DISCONTINUED | OUTPATIENT
Start: 2020-11-16 | End: 2020-11-18 | Stop reason: HOSPADM

## 2020-11-16 RX ORDER — MELOXICAM 15 MG/1
TABLET ORAL
Qty: 30 TABLET | Refills: 2
Start: 2020-11-16 | End: 2021-02-25

## 2020-11-16 RX ORDER — MORPHINE SULFATE 2 MG/ML
2 INJECTION, SOLUTION INTRAMUSCULAR; INTRAVENOUS EVERY 5 MIN PRN
Status: DISCONTINUED | OUTPATIENT
Start: 2020-11-16 | End: 2020-11-16 | Stop reason: HOSPADM

## 2020-11-16 RX ORDER — MINERAL OIL AND WHITE PETROLATUM 150; 830 MG/G; MG/G
OINTMENT OPHTHALMIC PRN
Status: DISCONTINUED | OUTPATIENT
Start: 2020-11-16 | End: 2020-11-18 | Stop reason: HOSPADM

## 2020-11-16 RX ORDER — MEPERIDINE HYDROCHLORIDE 25 MG/ML
12.5 INJECTION INTRAMUSCULAR; INTRAVENOUS; SUBCUTANEOUS EVERY 5 MIN PRN
Status: DISCONTINUED | OUTPATIENT
Start: 2020-11-16 | End: 2020-11-16 | Stop reason: HOSPADM

## 2020-11-16 RX ORDER — LIDOCAINE HYDROCHLORIDE 20 MG/ML
INJECTION, SOLUTION EPIDURAL; INFILTRATION; INTRACAUDAL; PERINEURAL PRN
Status: DISCONTINUED | OUTPATIENT
Start: 2020-11-16 | End: 2020-11-16 | Stop reason: SDUPTHER

## 2020-11-16 RX ORDER — SODIUM CHLORIDE 0.9 % (FLUSH) 0.9 %
10 SYRINGE (ML) INJECTION PRN
Status: DISCONTINUED | OUTPATIENT
Start: 2020-11-16 | End: 2020-11-18 | Stop reason: HOSPADM

## 2020-11-16 RX ORDER — OXYBUTYNIN CHLORIDE 10 MG/1
10 TABLET, EXTENDED RELEASE ORAL DAILY
Status: DISCONTINUED | OUTPATIENT
Start: 2020-11-17 | End: 2020-11-18 | Stop reason: HOSPADM

## 2020-11-16 RX ORDER — OXYCODONE HYDROCHLORIDE 5 MG/1
5 TABLET ORAL EVERY 4 HOURS PRN
Status: DISCONTINUED | OUTPATIENT
Start: 2020-11-16 | End: 2020-11-18 | Stop reason: HOSPADM

## 2020-11-16 RX ORDER — SODIUM CHLORIDE 0.9 % (FLUSH) 0.9 %
10 SYRINGE (ML) INJECTION EVERY 12 HOURS SCHEDULED
Status: DISCONTINUED | OUTPATIENT
Start: 2020-11-16 | End: 2020-11-16 | Stop reason: HOSPADM

## 2020-11-16 RX ORDER — OXYCODONE HYDROCHLORIDE 10 MG/1
10 TABLET ORAL ONCE
Status: COMPLETED | OUTPATIENT
Start: 2020-11-16 | End: 2020-11-16

## 2020-11-16 RX ORDER — DEXTROSE MONOHYDRATE 25 G/50ML
12.5 INJECTION, SOLUTION INTRAVENOUS PRN
Status: DISCONTINUED | OUTPATIENT
Start: 2020-11-16 | End: 2020-11-18 | Stop reason: HOSPADM

## 2020-11-16 RX ORDER — ONDANSETRON 2 MG/ML
INJECTION INTRAMUSCULAR; INTRAVENOUS PRN
Status: DISCONTINUED | OUTPATIENT
Start: 2020-11-16 | End: 2020-11-16 | Stop reason: SDUPTHER

## 2020-11-16 RX ORDER — HYDROXYCHLOROQUINE SULFATE 200 MG/1
200 TABLET, FILM COATED ORAL 2 TIMES DAILY
Status: DISCONTINUED | OUTPATIENT
Start: 2020-11-16 | End: 2020-11-18 | Stop reason: HOSPADM

## 2020-11-16 RX ORDER — INSULIN GLARGINE 100 [IU]/ML
0.25 INJECTION, SOLUTION SUBCUTANEOUS NIGHTLY
Status: DISCONTINUED | OUTPATIENT
Start: 2020-11-16 | End: 2020-11-17

## 2020-11-16 RX ORDER — FENTANYL CITRATE 50 UG/ML
50 INJECTION, SOLUTION INTRAMUSCULAR; INTRAVENOUS EVERY 5 MIN PRN
Status: DISCONTINUED | OUTPATIENT
Start: 2020-11-16 | End: 2020-11-16 | Stop reason: HOSPADM

## 2020-11-16 RX ORDER — ONDANSETRON 2 MG/ML
4 INJECTION INTRAMUSCULAR; INTRAVENOUS
Status: COMPLETED | OUTPATIENT
Start: 2020-11-16 | End: 2020-11-16

## 2020-11-16 RX ORDER — SODIUM CHLORIDE 450 MG/100ML
INJECTION, SOLUTION INTRAVENOUS CONTINUOUS
Status: DISCONTINUED | OUTPATIENT
Start: 2020-11-16 | End: 2020-11-18 | Stop reason: HOSPADM

## 2020-11-16 RX ORDER — PROPOFOL 10 MG/ML
INJECTION, EMULSION INTRAVENOUS PRN
Status: DISCONTINUED | OUTPATIENT
Start: 2020-11-16 | End: 2020-11-16 | Stop reason: SDUPTHER

## 2020-11-16 RX ORDER — DEXAMETHASONE SODIUM PHOSPHATE 4 MG/ML
INJECTION, SOLUTION INTRA-ARTICULAR; INTRALESIONAL; INTRAMUSCULAR; INTRAVENOUS; SOFT TISSUE PRN
Status: DISCONTINUED | OUTPATIENT
Start: 2020-11-16 | End: 2020-11-16 | Stop reason: SDUPTHER

## 2020-11-16 RX ORDER — ROCURONIUM BROMIDE 10 MG/ML
INJECTION, SOLUTION INTRAVENOUS PRN
Status: DISCONTINUED | OUTPATIENT
Start: 2020-11-16 | End: 2020-11-16 | Stop reason: SDUPTHER

## 2020-11-16 RX ORDER — LEVOTHYROXINE SODIUM 0.1 MG/1
300 TABLET ORAL
Status: DISCONTINUED | OUTPATIENT
Start: 2020-11-17 | End: 2020-11-18 | Stop reason: HOSPADM

## 2020-11-16 RX ORDER — FENTANYL CITRATE 50 UG/ML
INJECTION, SOLUTION INTRAMUSCULAR; INTRAVENOUS PRN
Status: DISCONTINUED | OUTPATIENT
Start: 2020-11-16 | End: 2020-11-16 | Stop reason: SDUPTHER

## 2020-11-16 RX ORDER — MORPHINE SULFATE 2 MG/ML
1 INJECTION, SOLUTION INTRAMUSCULAR; INTRAVENOUS EVERY 5 MIN PRN
Status: DISCONTINUED | OUTPATIENT
Start: 2020-11-16 | End: 2020-11-16 | Stop reason: HOSPADM

## 2020-11-16 RX ADMIN — PROPOFOL 200 MG: 10 INJECTION, EMULSION INTRAVENOUS at 12:20

## 2020-11-16 RX ADMIN — SODIUM CHLORIDE: 9 INJECTION, SOLUTION INTRAVENOUS at 12:16

## 2020-11-16 RX ADMIN — DOCUSATE SODIUM 50 MG AND SENNOSIDES 8.6 MG 1 TABLET: 8.6; 5 TABLET, FILM COATED ORAL at 21:36

## 2020-11-16 RX ADMIN — CEFAZOLIN SODIUM 2 G: 10 INJECTION, POWDER, FOR SOLUTION INTRAVENOUS at 12:16

## 2020-11-16 RX ADMIN — HYDROMORPHONE HYDROCHLORIDE 1 MG: 1 INJECTION, SOLUTION INTRAMUSCULAR; INTRAVENOUS; SUBCUTANEOUS at 13:40

## 2020-11-16 RX ADMIN — INSULIN LISPRO 1 UNITS: 100 INJECTION, SOLUTION INTRAVENOUS; SUBCUTANEOUS at 17:25

## 2020-11-16 RX ADMIN — Medication 140 MG: at 12:20

## 2020-11-16 RX ADMIN — HYDROXYCHLOROQUINE SULFATE 200 MG: 200 TABLET ORAL at 21:36

## 2020-11-16 RX ADMIN — FENTANYL CITRATE 50 MCG: 50 INJECTION, SOLUTION INTRAMUSCULAR; INTRAVENOUS at 15:03

## 2020-11-16 RX ADMIN — LIDOCAINE HYDROCHLORIDE 100 MG: 20 INJECTION, SOLUTION EPIDURAL; INFILTRATION; INTRACAUDAL; PERINEURAL at 12:20

## 2020-11-16 RX ADMIN — FENTANYL CITRATE 50 MCG: 50 INJECTION, SOLUTION INTRAMUSCULAR; INTRAVENOUS at 14:33

## 2020-11-16 RX ADMIN — INSULIN LISPRO 1 UNITS: 100 INJECTION, SOLUTION INTRAVENOUS; SUBCUTANEOUS at 21:36

## 2020-11-16 RX ADMIN — TRANEXAMIC ACID 1000 MG: 100 INJECTION, SOLUTION INTRAVENOUS at 14:38

## 2020-11-16 RX ADMIN — FENTANYL CITRATE 50 MCG: 50 INJECTION INTRAMUSCULAR; INTRAVENOUS at 12:53

## 2020-11-16 RX ADMIN — ROCURONIUM BROMIDE 40 MG: 10 INJECTION INTRAVENOUS at 12:30

## 2020-11-16 RX ADMIN — CEFAZOLIN SODIUM 1 G: 10 INJECTION, POWDER, FOR SOLUTION INTRAVENOUS at 12:43

## 2020-11-16 RX ADMIN — INSULIN LISPRO 9 UNITS: 100 INJECTION, SOLUTION INTRAVENOUS; SUBCUTANEOUS at 17:26

## 2020-11-16 RX ADMIN — OXYCODONE HYDROCHLORIDE 10 MG: 10 TABLET ORAL at 21:42

## 2020-11-16 RX ADMIN — SIMVASTATIN 20 MG: 10 TABLET, FILM COATED ORAL at 21:40

## 2020-11-16 RX ADMIN — DEXAMETHASONE SODIUM PHOSPHATE 10 MG: 4 INJECTION, SOLUTION INTRAMUSCULAR; INTRAVENOUS at 12:20

## 2020-11-16 RX ADMIN — CEFAZOLIN SODIUM 2 G: 10 INJECTION, POWDER, FOR SOLUTION INTRAVENOUS at 21:35

## 2020-11-16 RX ADMIN — ONDANSETRON 4 MG: 2 INJECTION INTRAMUSCULAR; INTRAVENOUS at 13:28

## 2020-11-16 RX ADMIN — SODIUM CHLORIDE: 4.5 INJECTION, SOLUTION INTRAVENOUS at 17:23

## 2020-11-16 RX ADMIN — INSULIN GLARGINE 29 UNITS: 100 INJECTION, SOLUTION SUBCUTANEOUS at 21:36

## 2020-11-16 RX ADMIN — MIDAZOLAM 2 MG: 1 INJECTION INTRAMUSCULAR; INTRAVENOUS at 12:16

## 2020-11-16 RX ADMIN — WHITE PETROLATUM 57.7 %-MINERAL OIL 31.9 % EYE OINTMENT: at 21:43

## 2020-11-16 RX ADMIN — ONDANSETRON 4 MG: 2 INJECTION INTRAMUSCULAR; INTRAVENOUS at 14:30

## 2020-11-16 RX ADMIN — FENTANYL CITRATE 50 MCG: 50 INJECTION INTRAMUSCULAR; INTRAVENOUS at 12:20

## 2020-11-16 RX ADMIN — CYCLOBENZAPRINE 5 MG: 10 TABLET, FILM COATED ORAL at 21:36

## 2020-11-16 RX ADMIN — SUGAMMADEX 200 MG: 100 INJECTION, SOLUTION INTRAVENOUS at 13:39

## 2020-11-16 RX ADMIN — OXYCODONE HYDROCHLORIDE 10 MG: 10 TABLET ORAL at 10:26

## 2020-11-16 RX ADMIN — OXYCODONE HYDROCHLORIDE 10 MG: 10 TABLET ORAL at 17:23

## 2020-11-16 ASSESSMENT — PAIN DESCRIPTION - ORIENTATION
ORIENTATION: RIGHT

## 2020-11-16 ASSESSMENT — PAIN SCALES - GENERAL
PAINLEVEL_OUTOF10: 5
PAINLEVEL_OUTOF10: 8
PAINLEVEL_OUTOF10: 0
PAINLEVEL_OUTOF10: 8
PAINLEVEL_OUTOF10: 4
PAINLEVEL_OUTOF10: 10
PAINLEVEL_OUTOF10: 8
PAINLEVEL_OUTOF10: 4
PAINLEVEL_OUTOF10: 7

## 2020-11-16 ASSESSMENT — PULMONARY FUNCTION TESTS
PIF_VALUE: 21
PIF_VALUE: 21
PIF_VALUE: 20
PIF_VALUE: 19
PIF_VALUE: 22
PIF_VALUE: 21
PIF_VALUE: 2
PIF_VALUE: 21
PIF_VALUE: 20
PIF_VALUE: 21
PIF_VALUE: 22
PIF_VALUE: 22
PIF_VALUE: 21
PIF_VALUE: 22
PIF_VALUE: 2
PIF_VALUE: 21
PIF_VALUE: 22
PIF_VALUE: 21
PIF_VALUE: 0
PIF_VALUE: 21
PIF_VALUE: 21
PIF_VALUE: 3
PIF_VALUE: 0
PIF_VALUE: 22
PIF_VALUE: 25
PIF_VALUE: 0
PIF_VALUE: 20
PIF_VALUE: 0
PIF_VALUE: 22
PIF_VALUE: 21
PIF_VALUE: 22
PIF_VALUE: 17
PIF_VALUE: 20
PIF_VALUE: 21
PIF_VALUE: 3
PIF_VALUE: 20
PIF_VALUE: 21
PIF_VALUE: 10
PIF_VALUE: 20
PIF_VALUE: 23
PIF_VALUE: 23
PIF_VALUE: 20
PIF_VALUE: 21
PIF_VALUE: 21
PIF_VALUE: 20
PIF_VALUE: 22
PIF_VALUE: 21
PIF_VALUE: 2
PIF_VALUE: 14
PIF_VALUE: 20
PIF_VALUE: 8
PIF_VALUE: 21
PIF_VALUE: 21
PIF_VALUE: 20
PIF_VALUE: 21
PIF_VALUE: 20
PIF_VALUE: 21
PIF_VALUE: 2
PIF_VALUE: 20
PIF_VALUE: 19
PIF_VALUE: 10
PIF_VALUE: 20
PIF_VALUE: 21
PIF_VALUE: 20
PIF_VALUE: 3
PIF_VALUE: 21
PIF_VALUE: 20
PIF_VALUE: 20
PIF_VALUE: 0
PIF_VALUE: 21
PIF_VALUE: 21
PIF_VALUE: 22
PIF_VALUE: 3
PIF_VALUE: 3
PIF_VALUE: 21
PIF_VALUE: 20
PIF_VALUE: 6
PIF_VALUE: 21
PIF_VALUE: 5
PIF_VALUE: 2
PIF_VALUE: 21
PIF_VALUE: 21
PIF_VALUE: 2
PIF_VALUE: 21
PIF_VALUE: 21
PIF_VALUE: 5
PIF_VALUE: 20
PIF_VALUE: 2
PIF_VALUE: 2
PIF_VALUE: 3
PIF_VALUE: 21
PIF_VALUE: 22
PIF_VALUE: 21
PIF_VALUE: 20
PIF_VALUE: 21
PIF_VALUE: 21
PIF_VALUE: 20
PIF_VALUE: 22
PIF_VALUE: 23

## 2020-11-16 ASSESSMENT — LIFESTYLE VARIABLES: SMOKING_STATUS: 1

## 2020-11-16 ASSESSMENT — PAIN DESCRIPTION - LOCATION
LOCATION: HIP

## 2020-11-16 ASSESSMENT — PAIN DESCRIPTION - DESCRIPTORS
DESCRIPTORS: ACHING
DESCRIPTORS: ACHING;DULL
DESCRIPTORS: ACHING

## 2020-11-16 ASSESSMENT — PAIN - FUNCTIONAL ASSESSMENT
PAIN_FUNCTIONAL_ASSESSMENT: 0-10
PAIN_FUNCTIONAL_ASSESSMENT: PREVENTS OR INTERFERES WITH MANY ACTIVE NOT PASSIVE ACTIVITIES
PAIN_FUNCTIONAL_ASSESSMENT: PREVENTS OR INTERFERES SOME ACTIVE ACTIVITIES AND ADLS

## 2020-11-16 ASSESSMENT — PAIN DESCRIPTION - PROGRESSION
CLINICAL_PROGRESSION: GRADUALLY WORSENING
CLINICAL_PROGRESSION: NOT CHANGED
CLINICAL_PROGRESSION: NOT CHANGED

## 2020-11-16 ASSESSMENT — PAIN DESCRIPTION - ONSET
ONSET: ON-GOING

## 2020-11-16 ASSESSMENT — PAIN DESCRIPTION - PAIN TYPE
TYPE: SURGICAL PAIN
TYPE: SURGICAL PAIN
TYPE: CHRONIC PAIN
TYPE: SURGICAL PAIN

## 2020-11-16 ASSESSMENT — PAIN DESCRIPTION - FREQUENCY
FREQUENCY: CONTINUOUS

## 2020-11-16 ASSESSMENT — ENCOUNTER SYMPTOMS: SHORTNESS OF BREATH: 0

## 2020-11-16 NOTE — PROGRESS NOTES
Physical Therapy    Facility/Department: [facility]  Initial Assessment    NAME: Satish Perez  : 1965  MRN: [id number]    Date of Service: 2020    Assessment / Discharge Recommendations:  -anticipate discharge to home with family assist and Home PT (going to her daughter's home)  -will see in AM to assess readiness for home and to practice steps as needed for home entry   -she is familiar with post op process from prior THR in 2016  -will need rolling walker for home use    Body structures, Functions, Activity limitations: Decreased functional mobility ; Decreased ADL status; Decreased strength  Prognosis: Good  Decision Making: Medium Complexity  REQUIRES PT FOLLOW UP: Yes  Activity Tolerance  Activity Tolerance: Patient limited by pain       Patient Diagnosis(es): Diagnoses of Arthritis of right hip and Pain of both hip joints were pertinent to this visit. has a past medical history of Anemia, Anesthesia complication, Anxiety and depression, Chronic back pain, Condyloma acuminatum, Headache(784.0), Hyperlipidemia, Hypertension, Hypothyroidism, IBS (irritable bowel syndrome), Metrorrhagia, Neuropathy, Osteoarthritis, Urgency of urination, and Wears glasses. has a past surgical history that includes [place] tooth extraction; Total hip arthroplasty (Left, 2016); and LEEP. Restrictions  Restrictions/Precautions  Restrictions/Precautions: Fall Risk  Position Activity Restriction  Hip Precautions: No hip flexion > 90 degrees, No hip extension, No hip external rotation  Other position/activity restrictions: WBAT  Vision/Hearing  Vision: Impaired  Vision Exceptions: Wears glasses at all times  Hearing: Within functional limits     Subjective  General  Chart Reviewed:  Yes  Additional Pertinent Hx: here for elective Right THR    (had left THR 2016)  Response To Previous Treatment: Not applicable  Family / Caregiver Present: No  Follows Commands: Within Functional Limits  Subjective  Subjective: arrived to room to patient sitting to EOB with assist of nursing staff - c/o severe pain -agreeable to attempt up to walker to ambulate to bathroom  Pain Screening  Patient Currently in Pain: Yes  Social/Functional History  Social/Functional History  Lives With: Alone  Type of Home: House  Home Layout: One level, Laundry in basement, Able to Live on Main level with bedroom/bathroom  Home Access: Stairs to enter without rails  Entrance Stairs - Number of Steps: 2  Bathroom Shower/Tub: Tub/Shower unit  Bathroom Toilet: Standard  Bathroom Equipment: Grab bars in shower, Toilet raiser  Bathroom Accessibility: Accessible  Home Equipment: Rolling walker, Cane, Sock aid, Long-handled shoehorn  ADL Assistance: Independent  Homemaking Assistance: Independent  Ambulation Assistance: Independent(with cane)  Transfer Assistance: Independent  Active : Yes  Additional Comments: per FE AYALA plan is home o dtr's home. Uncertain if above is patient's or dtr's home. PMHx of L THR 8-.   Cognition   Cognition  Overall Cognitive Status: WFL  Objective  ROM and strength non-surgical limbs grossly wfl  Motor Control  Gross Motor?: WFL  Sensation  Overall Sensation Status: (states diminished feeling in her feet)  Bed mobility  Supine to Sit: Minimal assistance  Sit to Supine: Minimal assistance  Transfers  Sit to Stand: Minimal Assistance  Stand to sit: Minimal Assistance  Ambulation  Ambulation?: (not able to advance forward but able to take a few lateral steps to the left to sit to recliner)     Balance  Comments: midline in sitting at the EOB and in static stance on the walker   -dynamic not assessed at this session  Exercises  Ankle Pumps: 30 per hour in easy pace  Comments: encouraged practice with the spirometer as instructed by RT     Plan   Plan  Times per week: 1-4 sessions  Current Treatment Recommendations: Transfer Training, Patient/Caregiver Education & Training, ADL/Self-care

## 2020-11-16 NOTE — BRIEF OP NOTE
risk.  All material risks, benefits, and reasonable alternatives including postponing the procedure were discussed. The patient does wish to proceed with the procedure at this time.           Electronically signed by Quique Jacobson MD on 11/16/2020 at 1:36 PM

## 2020-11-16 NOTE — PROGRESS NOTES
Met with patient at bedside, patient is up in chair having pain but falls asleep easily. discussed role of nurse navigator and gave contact information. Reviewed reasons to call with questions or concerns, importance of TEDS, Incentive spirometer, pain medication, and physical and occupational therapy. 2/4 bed rails up, bed in lowest position, fall precautions in place, call light within reach. Pulses present bilaterally +2 pedal, no odor noted at surgical dressing right hip. dry Dressing intact with mall amount of sanguinous drainage. Ice in place. Dominic and scds on BLEs. Neurovascular checks performed and WNLs with exception of chronic numbness. DC Plan: to daughter's home with [facility]. Daughter shanice to transport patient. DME needs:needs rolling walker. Will inform evans with arti Velazco  Orthopedic Nurse Navigator  Phone number: [phone]    Future Appointments   Date Time Provider Rima Lopez   11/30/2020 10:15 AM MD Sena Hui [facility]     Electronically signed by Janie Worrell RN on 11/16/2020 at 4:45 PM

## 2020-11-16 NOTE — PROGRESS NOTES
Patient arrived to PACU. Anxious, c/o cant breathe. Breathe sounds clear. Right hip dressing intact with ice.

## 2020-11-16 NOTE — H&P
length about the risks of miah Covid-19 during their perioperative period and any recovery window from their procedure. The patient was made aware that miah Covid-19  may worsen their prognosis for recovering from their procedure  and lend to a higher morbidity and/or mortality risk. All material risks, benefits, and reasonable alternatives including postponing the procedure were discussed. The patient does wish to proceed with the procedure at this time.           Mike Norwood MD    Electronically signed by Quique Jacobson MD on 11/16/2020 at 11:43 AM

## 2020-11-16 NOTE — PROGRESS NOTES
Educated patient on purpose of 4 eyes skin assessment and asked patient if allowed to perform, patient verbalized understanding and agreed to assessment. 4 Eyes Skin Assessment     The patient is being assess for  Post-Op Surgical    I agree that 2 RN's have performed a thorough Head to Toe Skin Assessment on the patient. ALL assessment sites listed below have been assessed. Areas assessed by both nurses: ilene and alvaro  [x]   Head, Face, and Ears   [x]   Shoulders, Back, and Chest  [x]   Arms, Elbows, and Hands   [x]   Coccyx, Sacrum, and IschIum  [x]   Legs, Feet, and Heels        Does the Patient have Skin Breakdown?   No         [place] Prevention initiated:  Yes   Wound Care Orders initiated:  NA      [facility] nurse consulted for Pressure Injury (Stage 3,4, Unstageable, DTI, NWPT, and Complex wounds), New and Established Ostomies:  NA      Nurse 1 eSignature: Electronically signed by Shannon Cuenca RN on 11/16/20 at 4:45 PM EST    **SHARE this note so that the co-signing nurse is able to place an eSignature    Nurse 2 eSignature: Electronically signed by Kandi Springer RN on 11/16/20 at 5:29 PM EST

## 2020-11-16 NOTE — ANESTHESIA POSTPROCEDURE EVALUATION
Department of Anesthesiology  Postprocedure Note    Patient: Debbi Hodge  MRN: [id number]  YOB: 1965  Date of evaluation: 11/16/2020  Time:  3:08 PM     Procedure Summary     Date:  11/16/20 Room / Location:  [facility]    Anesthesia Start:  1216 Anesthesia Stop:  1400    Procedure:  RIGHT LATERAL TOTAL HIP REPLACEMENT (Right Hip) Diagnosis:       Arthritis of right hip      (RIGHT HIP ARTHRITIS)    Surgeon:  Amadou Irvin MD Responsible Provider:  Curly Castillo MD    Anesthesia Type:  general ASA Status:  3          Anesthesia Type: general    Jacob Phase I: Jacob Score: 10    Jacob Phase II:      Last vitals: Reviewed and per EMR flowsheets.        Anesthesia Post Evaluation    Patient location during evaluation: PACU  Patient participation: complete - patient participated  Level of consciousness: awake and alert  Pain score: 0  Airway patency: patent  Nausea & Vomiting: no nausea and no vomiting  Complications: no  Cardiovascular status: blood pressure returned to baseline  Respiratory status: acceptable  Hydration status: euvolemic

## 2020-11-16 NOTE — PROGRESS NOTES
PRN  oxyCODONE (ROXICODONE) immediate release tablet 5 mg, 5 mg, Oral, PRN **OR** oxyCODONE HCl (OXY-IR) immediate release tablet 10 mg, 10 mg, Oral, PRN  ondansetron (ZOFRAN) injection 4 mg, 4 mg, Intravenous, Once PRN  meperidine (DEMEROL) injection 12.5 mg, 12.5 mg, Intravenous, Q5 Min PRN  povidone-iodine (BETADINE) 10 % 120 mL in sodium chloride 0.9 % 1,000 mL irrigation, , , Continuous PRN  tranexamic acid (CYKLOKAPRON) 3 g in sodium chloride 0.9 % 70 mL IVPB, , , Continuous PRN  tranexamic acid (CYKLOKAPRON) 1,000 mg in sodium chloride 0.9 % 50 mL IVPB, 1,000 mg, Intravenous, Once    ASSESSMENT AND PLAN      Post right MARY, stable exam  DVT prophylaxis ordered, Lovenox as inpt then switch to Eliquis bid for 30 total days after discharge from hospital for DVT prophylaxis.  Pt is \"high\" risk with BMI>40  PT OT for ADL's and ambulation as tolerated  SS for DC planning, Home with home care tomorrow  IV or PO pain med as ordered    Sedrick Jerry 89  11/16/2020  2:19 PM

## 2020-11-16 NOTE — ANESTHESIA PRE PROCEDURE
Department of Anesthesiology  Preprocedure Note       Name:  Wayne Moraes   Age:  60 y.o.  :  1965                                          MRN:  [id number]         Date:  2020      Surgeon: Vera Corrales):  Didi Nielsen MD    Procedure: Procedure(s):  RIGHT LATERAL TOTAL HIP REPLACEMENT    Medications prior to admission:   Prior to Admission medications    Medication Sig Start Date End Date Taking?  Authorizing Provider   simvastatin (ZOCOR) 20 MG tablet TAKE ONE TABLET BY MOUTH ONCE NIGHTLY 10/30/20  Yes Paresh Stevens DO   hydroxychloroquine (PLAQUENIL) 200 MG tablet TAKE ONE TABLET BY MOUTH TWICE A DAY 10/5/20  Yes Fatoumata Tong MD   triamterene-hydroCHLOROthiazide (DYAZIDE) 37.5-25 MG per capsule TAKE ONE CAPSULE BY MOUTH DAILY 20  Yes Paresh Stevens DO   oxybutynin (DITROPAN-XL) 10 MG extended release tablet TAKE ONE TABLET BY MOUTH DAILY 20  Yes Paresh Stevens DO   meloxicam (MOBIC) 15 MG tablet TAKE ONE TABLET BY MOUTH DAILY 20  Yes Paresh Stevens DO   levothyroxine (SYNTHROID) 300 MCG tablet TAKE ONE TABLET BY MOUTH DAILY 20  Yes Paresh Stevens DO   folic acid (FOLVITE) 198 MCG tablet Take 400 mcg by mouth daily   Yes Historical Provider, MD   vitamin D (CHOLECALCIFEROL) 1000 UNIT TABS tablet Take 1,000 Units by mouth daily   Yes Historical Provider, MD   vitamin B-12 (CYANOCOBALAMIN) 1000 MCG tablet Take 1,000 mcg by mouth daily   Yes Historical Provider, MD   Biotin 63681 MCG TABS Take by mouth   Yes Historical Provider, MD       Current medications:    Current Facility-Administered Medications   Medication Dose Route Frequency Provider Last Rate Last Dose    0.9 % sodium chloride infusion   Intravenous Continuous Felicitas Pelaez MD        sodium chloride flush 0.9 % injection 10 mL  10 mL Intravenous 2 times per day Felicitas Pelaez MD        sodium chloride flush 0.9 % injection 10 mL  10 mL Intravenous PRN Felicitas Pelaez MD        ceFAZolin (ANCEF) 2 g in dextrose 5 % 100 mL IVPB  2 g Intravenous Once Gavin Judge MD           Allergies:     Allergies   Allergen Reactions    Glucosamine Chondroitin Complx [Glucosamine-Chondroitin] Swelling     \"Due to sulfa allergy\" per pt    Sulfa Antibiotics Itching, Swelling and Other (See Comments)     Pt states gets really hot like she is on fire,and swelling of face,hands, and feet    Erythromycin Swelling       Problem List:    Patient Active Problem List   Diagnosis Code    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    Primary osteoarthritis of both knees M17.0    Arthritis of left hip M16.12    Class 2 severe obesity due to excess calories with serious comorbidity and body mass index (BMI) of 37.0 to 37.9 in adult ([facility] [address] E66.01, Z68.37    Osteoarthritis of multiple joints M15.9    Migraine without aura G43.009    Metrorrhagia N92.1    Mammographic microcalcification R92.0    Lumbago M54.5    Hypothyroidism E03.9    Conjunctivitis H10.9    Condyloma acuminatum A63.0    Abnormal mammogram R92.8    Skin ulcer, limited to breakdown of skin ([facility] [address] L98.491       Past Medical History:        Diagnosis Date    Anemia     Anesthesia complication     severe headache after woke up after wisdom teeth    Anxiety and depression     Chronic back pain     Condyloma acuminatum     Headache(784.0)     Hyperlipidemia     Hypertension     Hypothyroidism     IBS (irritable bowel syndrome)     Metrorrhagia     Neuropathy     Osteoarthritis     Urgency of urination     Wears glasses     driving at night/reading       Past Surgical History:        Procedure Laterality Date    LEEP      TOTAL HIP ARTHROPLASTY Left 08/16/2016    WISDOM TOOTH EXTRACTION         Social History:    Social History     Tobacco Use    Smoking status: Current Every Day Smoker     Packs/day: 1.00     Years: 30.00     Pack years: 30.00     Types: Cigarettes    Smokeless tobacco: Never Used    Tobacco comment: encouraged to stop smoking Applicable):   Lab Results   Component Value Date    PREGTESTUR Negative 07/12/2017        ABGs: No results found for: PHART, PO2ART, IGK1XBB, UHW9DQE, BEART, B3PIEFQI     Type & Screen (If Applicable):  No results found for: LABABO, LABRH    Drug/Infectious Status (If Applicable):  No results found for: HIV, HEPCAB    COVID-19 Screening (If Applicable):   Lab Results   Component Value Date    COVID19 Not Detected 11/10/2020         Anesthesia Evaluation  Patient summary reviewed and Nursing notes reviewed no history of anesthetic complications:   Airway: Mallampati: II  TM distance: >3 FB   Neck ROM: full  Mouth opening: > = 3 FB Dental: normal exam         Pulmonary: breath sounds clear to auscultation  (+) current smoker    (-) pneumonia, COPD, asthma, shortness of breath, recent URI and sleep apnea          Patient smoked on day of surgery. ROS comment: 30 pack year   Cardiovascular:    (+) hypertension:,     (-) pacemaker, valvular problems/murmurs, past MI, CAD, CABG/stent, dysrhythmias,  angina,  CHF, orthopnea, PND and  MAJOR      Rhythm: regular                      Neuro/Psych:   (+) headaches:, psychiatric history:   (-) seizures, neuromuscular disease, TIA, CVA and depression/anxiety            GI/Hepatic/Renal:   (+) morbid obesity     (-) hiatal hernia, GERD, PUD, hepatitis, liver disease, no renal disease and bowel prep       Endo/Other:    (+) hypothyroidism: arthritis: OA., .                 Abdominal:           Vascular:     - PVD, DVT and PE. Anesthesia Plan      general     ASA 3       Induction: intravenous. MIPS: Postoperative opioids intended and Prophylactic antiemetics administered. Anesthetic plan and risks discussed with patient and child/children. Plan discussed with CRNA.                 Samantha Yates MD   11/16/2020        This pre-anesthesia assessment may be used as a history and physical.    DOS STAFF ADDENDUM:    Pt seen and examined, chart reviewed (including anesthesia, drug and allergy history). No interval changes to history and physical examination. Anesthetic plan, risks, benefits, alternatives, and personnel involved discussed with patient. Patient verbalized an understanding and agrees to proceed.       Lovely Holcomb MD  November 16, 2020  10:50 AM

## 2020-11-17 LAB
ESTIMATED AVERAGE GLUCOSE: 139.9 MG/DL
GLUCOSE BLD-MCNC: 164 MG/DL (ref 70–99)
GLUCOSE BLD-MCNC: 167 MG/DL (ref 70–99)
GLUCOSE BLD-MCNC: 96 MG/DL (ref 70–99)
HBA1C MFR BLD: 6.5 %
HCT VFR BLD CALC: 42.1 % (ref 36–48)
HEMOGLOBIN: 14.2 G/DL (ref 12–16)
PERFORMED ON: ABNORMAL
PERFORMED ON: ABNORMAL
PERFORMED ON: NORMAL

## 2020-11-17 PROCEDURE — 6370000000 HC RX 637 (ALT 250 FOR IP): Performed by: ORTHOPAEDIC SURGERY

## 2020-11-17 PROCEDURE — 97116 GAIT TRAINING THERAPY: CPT

## 2020-11-17 PROCEDURE — 97530 THERAPEUTIC ACTIVITIES: CPT

## 2020-11-17 PROCEDURE — 97166 OT EVAL MOD COMPLEX 45 MIN: CPT

## 2020-11-17 PROCEDURE — 6360000002 HC RX W HCPCS: Performed by: ORTHOPAEDIC SURGERY

## 2020-11-17 PROCEDURE — 85014 HEMATOCRIT: CPT

## 2020-11-17 PROCEDURE — 85018 HEMOGLOBIN: CPT

## 2020-11-17 PROCEDURE — 83036 HEMOGLOBIN GLYCOSYLATED A1C: CPT

## 2020-11-17 PROCEDURE — 36415 COLL VENOUS BLD VENIPUNCTURE: CPT

## 2020-11-17 PROCEDURE — 6360000002 HC RX W HCPCS: Performed by: NURSE PRACTITIONER

## 2020-11-17 PROCEDURE — 1200000000 HC SEMI PRIVATE

## 2020-11-17 PROCEDURE — 97535 SELF CARE MNGMENT TRAINING: CPT

## 2020-11-17 PROCEDURE — 6370000000 HC RX 637 (ALT 250 FOR IP): Performed by: NURSE PRACTITIONER

## 2020-11-17 PROCEDURE — 2580000003 HC RX 258: Performed by: ORTHOPAEDIC SURGERY

## 2020-11-17 RX ORDER — CYCLOBENZAPRINE HCL 5 MG
5 TABLET ORAL 3 TIMES DAILY PRN
Qty: 15 TABLET | Refills: 0 | Status: SHIPPED | OUTPATIENT
Start: 2020-11-17 | End: 2020-11-17 | Stop reason: HOSPADM

## 2020-11-17 RX ORDER — DIAZEPAM 2 MG/1
2 TABLET ORAL EVERY 8 HOURS PRN
Qty: 15 TABLET | Refills: 0 | Status: SHIPPED | OUTPATIENT
Start: 2020-11-17 | End: 2020-11-18

## 2020-11-17 RX ORDER — DIAZEPAM 2 MG/1
2 TABLET ORAL EVERY 8 HOURS PRN
Status: DISCONTINUED | OUTPATIENT
Start: 2020-11-17 | End: 2020-11-18 | Stop reason: HOSPADM

## 2020-11-17 RX ORDER — NICOTINE 21 MG/24HR
1 PATCH, TRANSDERMAL 24 HOURS TRANSDERMAL DAILY
Status: DISCONTINUED | OUTPATIENT
Start: 2020-11-17 | End: 2020-11-18 | Stop reason: HOSPADM

## 2020-11-17 RX ORDER — LORAZEPAM 2 MG/ML
1 INJECTION INTRAMUSCULAR ONCE
Status: COMPLETED | OUTPATIENT
Start: 2020-11-17 | End: 2020-11-17

## 2020-11-17 RX ORDER — CYCLOBENZAPRINE HCL 10 MG
5 TABLET ORAL 3 TIMES DAILY PRN
Status: DISCONTINUED | OUTPATIENT
Start: 2020-11-17 | End: 2020-11-17 | Stop reason: SDUPTHER

## 2020-11-17 RX ADMIN — INSULIN LISPRO 1 UNITS: 100 INJECTION, SOLUTION INTRAVENOUS; SUBCUTANEOUS at 21:09

## 2020-11-17 RX ADMIN — HYDROXYCHLOROQUINE SULFATE 200 MG: 200 TABLET ORAL at 21:09

## 2020-11-17 RX ADMIN — MORPHINE SULFATE 2 MG: 2 INJECTION, SOLUTION INTRAMUSCULAR; INTRAVENOUS at 16:35

## 2020-11-17 RX ADMIN — CYCLOBENZAPRINE 5 MG: 10 TABLET, FILM COATED ORAL at 07:58

## 2020-11-17 RX ADMIN — CEFAZOLIN SODIUM 2 G: 10 INJECTION, POWDER, FOR SOLUTION INTRAVENOUS at 03:56

## 2020-11-17 RX ADMIN — MORPHINE SULFATE 2 MG: 2 INJECTION, SOLUTION INTRAMUSCULAR; INTRAVENOUS at 03:56

## 2020-11-17 RX ADMIN — HYDROXYCHLOROQUINE SULFATE 200 MG: 200 TABLET ORAL at 09:31

## 2020-11-17 RX ADMIN — LORAZEPAM 1 MG: 2 INJECTION INTRAMUSCULAR; INTRAVENOUS at 21:10

## 2020-11-17 RX ADMIN — MORPHINE SULFATE 2 MG: 2 INJECTION, SOLUTION INTRAMUSCULAR; INTRAVENOUS at 07:58

## 2020-11-17 RX ADMIN — ENOXAPARIN SODIUM 30 MG: 30 INJECTION SUBCUTANEOUS at 09:33

## 2020-11-17 RX ADMIN — LEVOTHYROXINE SODIUM 300 MCG: 0.1 TABLET ORAL at 05:10

## 2020-11-17 RX ADMIN — Medication 10 ML: at 09:33

## 2020-11-17 RX ADMIN — OXYCODONE HYDROCHLORIDE 10 MG: 10 TABLET ORAL at 09:31

## 2020-11-17 RX ADMIN — INSULIN LISPRO 1 UNITS: 100 INJECTION, SOLUTION INTRAVENOUS; SUBCUTANEOUS at 09:34

## 2020-11-17 RX ADMIN — DIAZEPAM 2 MG: 2 TABLET ORAL at 11:16

## 2020-11-17 RX ADMIN — OXYCODONE HYDROCHLORIDE 10 MG: 10 TABLET ORAL at 19:58

## 2020-11-17 RX ADMIN — OXYCODONE HYDROCHLORIDE 10 MG: 10 TABLET ORAL at 05:10

## 2020-11-17 RX ADMIN — TRIAMTERENE AND HYDROCHLOROTHIAZIDE 1 TABLET: 75; 50 TABLET ORAL at 09:32

## 2020-11-17 RX ADMIN — WHITE PETROLATUM 57.7 %-MINERAL OIL 31.9 % EYE OINTMENT: at 09:33

## 2020-11-17 RX ADMIN — DOCUSATE SODIUM 50 MG AND SENNOSIDES 8.6 MG 1 TABLET: 8.6; 5 TABLET, FILM COATED ORAL at 09:31

## 2020-11-17 RX ADMIN — DOCUSATE SODIUM 50 MG AND SENNOSIDES 8.6 MG 1 TABLET: 8.6; 5 TABLET, FILM COATED ORAL at 21:09

## 2020-11-17 RX ADMIN — SODIUM CHLORIDE, PRESERVATIVE FREE 10 ML: 5 INJECTION INTRAVENOUS at 21:09

## 2020-11-17 RX ADMIN — ENOXAPARIN SODIUM 30 MG: 30 INJECTION SUBCUTANEOUS at 21:09

## 2020-11-17 RX ADMIN — SODIUM CHLORIDE, PRESERVATIVE FREE 10 ML: 5 INJECTION INTRAVENOUS at 07:58

## 2020-11-17 RX ADMIN — OXYBUTYNIN CHLORIDE 10 MG: 10 TABLET, EXTENDED RELEASE ORAL at 09:31

## 2020-11-17 RX ADMIN — ONDANSETRON 4 MG: 2 INJECTION INTRAMUSCULAR; INTRAVENOUS at 01:20

## 2020-11-17 RX ADMIN — INSULIN LISPRO 9 UNITS: 100 INJECTION, SOLUTION INTRAVENOUS; SUBCUTANEOUS at 09:36

## 2020-11-17 RX ADMIN — ONDANSETRON 4 MG: 2 INJECTION INTRAMUSCULAR; INTRAVENOUS at 16:39

## 2020-11-17 RX ADMIN — OXYCODONE HYDROCHLORIDE 10 MG: 10 TABLET ORAL at 14:12

## 2020-11-17 RX ADMIN — Medication 10 ML: at 16:37

## 2020-11-17 ASSESSMENT — PAIN - FUNCTIONAL ASSESSMENT
PAIN_FUNCTIONAL_ASSESSMENT: PREVENTS OR INTERFERES SOME ACTIVE ACTIVITIES AND ADLS

## 2020-11-17 ASSESSMENT — PAIN DESCRIPTION - ONSET
ONSET: ON-GOING

## 2020-11-17 ASSESSMENT — PAIN DESCRIPTION - LOCATION
LOCATION: HIP

## 2020-11-17 ASSESSMENT — PAIN DESCRIPTION - ORIENTATION
ORIENTATION: RIGHT

## 2020-11-17 ASSESSMENT — PAIN DESCRIPTION - DESCRIPTORS
DESCRIPTORS: ACHING

## 2020-11-17 ASSESSMENT — PAIN SCALES - GENERAL
PAINLEVEL_OUTOF10: 5
PAINLEVEL_OUTOF10: 10
PAINLEVEL_OUTOF10: 6
PAINLEVEL_OUTOF10: 7
PAINLEVEL_OUTOF10: 0
PAINLEVEL_OUTOF10: 10
PAINLEVEL_OUTOF10: 9
PAINLEVEL_OUTOF10: 8
PAINLEVEL_OUTOF10: 10
PAINLEVEL_OUTOF10: 6

## 2020-11-17 ASSESSMENT — PAIN DESCRIPTION - PROGRESSION
CLINICAL_PROGRESSION: NOT CHANGED
CLINICAL_PROGRESSION: GRADUALLY IMPROVING
CLINICAL_PROGRESSION: NOT CHANGED
CLINICAL_PROGRESSION: GRADUALLY IMPROVING
CLINICAL_PROGRESSION: NOT CHANGED
CLINICAL_PROGRESSION: NOT CHANGED
CLINICAL_PROGRESSION: GRADUALLY WORSENING
CLINICAL_PROGRESSION: GRADUALLY WORSENING
CLINICAL_PROGRESSION: RAPIDLY WORSENING
CLINICAL_PROGRESSION: GRADUALLY IMPROVING
CLINICAL_PROGRESSION: GRADUALLY IMPROVING
CLINICAL_PROGRESSION: RAPIDLY WORSENING
CLINICAL_PROGRESSION: GRADUALLY IMPROVING

## 2020-11-17 ASSESSMENT — PAIN DESCRIPTION - FREQUENCY
FREQUENCY: CONTINUOUS

## 2020-11-17 ASSESSMENT — PAIN DESCRIPTION - PAIN TYPE
TYPE: SURGICAL PAIN

## 2020-11-17 NOTE — PROGRESS NOTES
Patient in bed, eyes closed, no distress noted.  Bed in lowest position, call light in reach, bed alarm set, with monitor

## 2020-11-17 NOTE — PROGRESS NOTES
[facility] Orthopedic Surgery   Progress Note      S/P :  SUBJECTIVE  Up in chair. States pain in right anterior and lateral thigh mostly Did not have this with other hip surgery she says. Pain is   described in right groin and with the intensity of mild. Pain is described as aching. Pain in right thigh is tender, aching and severe. She took pain med and muscle relaxer this AM.       OBJECTIVE              Physical                      VITALS:  /79   Pulse 98   Temp 99.4 °F (37.4 °C) (Oral)   Resp 16   Ht 5' 7\" (1.702 m)   Wt 262 lb (118.8 kg)   LMP 01/27/2015   SpO2 91%   BMI 41.04 kg/m²                     MUSCULOSKELETAL:  right foot NVI. Wiggles toes to command. Pedal pulses are palpable. NEUROLOGIC:                                  Sensory:  Touch:  Right Lower Extremity:  normal                                                 Surgical wound appears clean and dry right hip with Prineo dressing. Ice to right lateral hip and thigh.      Data       CBC:   Lab Results   Component Value Date    WBC 9.5 11/09/2020    RBC 4.92 11/09/2020    HGB 14.2 11/17/2020    HCT 42.1 11/17/2020    MCV 92.4 11/09/2020    MCH 31.2 11/09/2020    MCHC 33.8 11/09/2020    RDW 15.0 11/09/2020     11/09/2020    MPV 10.3 11/09/2020        WBC:    Lab Results   Component Value Date    WBC 9.5 11/09/2020        Hemoglobin/Hematocrit:    Lab Results   Component Value Date    HGB 14.2 11/17/2020    HCT 42.1 11/17/2020        PT/INR:    Lab Results   Component Value Date    PROTIME 11.9 11/09/2020    INR 1.03 11/09/2020              Current Inpatient Medications             Current Facility-Administered Medications: hydroxychloroquine (PLAQUENIL) tablet 200 mg, 200 mg, Oral, BID  levothyroxine (SYNTHROID) tablet 300 mcg, 300 mcg, Oral, QAM AC  oxybutynin (DITROPAN-XL) extended release tablet 10 mg, 10 mg, Oral, Daily  simvastatin (ZOCOR) tablet 20 mg, 20 mg, Oral, Nightly  triamterene-hydroCHLOROthiazide (MAXZIDE) 75-50 MG per tablet 1 tablet, 1 tablet, Oral, Daily  0.45 % sodium chloride infusion, , Intravenous, Continuous  sodium chloride flush 0.9 % injection 10 mL, 10 mL, Intravenous, 2 times per day  sodium chloride flush 0.9 % injection 10 mL, 10 mL, Intravenous, PRN  acetaminophen (TYLENOL) tablet 650 mg, 650 mg, Oral, Q4H PRN  oxyCODONE (ROXICODONE) immediate release tablet 5 mg, 5 mg, Oral, Q4H PRN **OR** oxyCODONE HCl (OXY-IR) immediate release tablet 10 mg, 10 mg, Oral, Q4H PRN  morphine (PF) injection 2 mg, 2 mg, Intravenous, Q3H PRN  sennosides-docusate sodium (SENOKOT-S) 8.6-50 MG tablet 1 tablet, 1 tablet, Oral, BID  magnesium hydroxide (MILK OF MAGNESIA) 400 MG/5ML suspension 30 mL, 30 mL, Oral, Daily PRN  ondansetron (ZOFRAN) injection 4 mg, 4 mg, Intravenous, Q6H PRN  enoxaparin (LOVENOX) injection 30 mg, 30 mg, Subcutaneous, BID  insulin glargine (LANTUS) injection vial 29 Units, 0.25 Units/kg, Subcutaneous, Nightly  insulin lispro (HUMALOG) injection vial 9 Units, 0.08 Units/kg, Subcutaneous, TID WC  insulin lispro (HUMALOG) injection vial 0-6 Units, 0-6 Units, Subcutaneous, TID WC  insulin lispro (HUMALOG) injection vial 0-3 Units, 0-3 Units, Subcutaneous, Nightly  glucose (GLUTOSE) 40 % oral gel 15 g, 15 g, Oral, PRN  dextrose 50 % IV solution, 12.5 g, Intravenous, PRN  glucagon (rDNA) injection 1 mg, 1 mg, Intramuscular, PRN  dextrose 5 % solution, 100 mL/hr, Intravenous, PRN  lubrifresh P.M. (artificial tears) ophthalmic ointment, , Both Eyes, PRN  cyclobenzaprine (FLEXERIL) tablet 5 mg, 5 mg, Oral, TID PRN    ASSESSMENT AND PLAN      Post rightTHA, stable exam  DVT prophylaxis ordered, Lovenox as inpt then Eliquis bid for 30 total days after discharge from hospital for DVT prophylaxis  Reviewed with patient importance of SHELBIE hose on daily/ off at  Night for 2 weeks as well and continue anticoagulant medication at home as ordered to reduce risk of postoperative DVT or PE clots.  Pt verbalized understanding.   PT OT for ADL's and ambulation as tolerated, anterior hip precautions  SS for DC planning, home with home care today if pain improves  IV or PO pain med as ordered    Felicita Daniel  11/17/2020  9:41 AM

## 2020-11-17 NOTE — PROGRESS NOTES
Medication Reconciliation    List of medications patient is currently taking is complete. Source of information: 1. Conversation with patient at bedside                                      2. EPIC records      Allergies  Glucosamine chondroitin complx [glucosamine-chondroitin]; Sulfa antibiotics; and Erythromycin     Notes regarding home medications:   1. Medications in chart are accurate.       Kike Pena, PharmD Candidate [id number]  11/17/20 7:59AM

## 2020-11-17 NOTE — PROGRESS NOTES
Physical Therapy    Facility/Department: [facility] 3 ORTHOPEDICS    Treatments notes    NAME: Ruth Rivas  : 1965  MRN: [id number]    Date of Service: 2020    Assessment/ Discharge Recommendations:  -continues to struggle with her mobility on the walker  -pain rating to 10/10 (nursing aware and providing pain medications timely)  -cold packs (large) to right anterior thigh  -not safe to attempt trial on steps as needed to enter home  -recommend to defer discharge to home today and reassess in AM    Body structures, Functions, Activity limitations: Decreased functional mobility ; Decreased ADL status; Decreased strength  Prognosis: Good  Activity Tolerance  Activity Tolerance: Patient limited by pain       Patient Diagnosis(es): Diagnoses of Arthritis of right hip and Pain of both hip joints were pertinent to this visit. has a past medical history of Anemia, Anesthesia complication, Anxiety and depression, Chronic back pain, Condyloma acuminatum, Headache(784.0), Hyperlipidemia, Hypertension, Hypothyroidism, IBS (irritable bowel syndrome), Metrorrhagia, Neuropathy, Osteoarthritis, Urgency of urination, and Wears glasses. has a past surgical history that includes [place] tooth extraction; Total hip arthroplasty (Left, 2016); LEEP; and Total hip arthroplasty (Right, 2020). Restrictions  Restrictions/Precautions  Restrictions/Precautions: Fall Risk  Position Activity Restriction  Hip Precautions: No hip flexion > 90 degrees, No hip extension, No hip external rotation  Other position/activity restrictions: WBAT  Subjective  General  Chart Reviewed: Yes  Patient assessed for rehabilitation services?: Yes  Additional Pertinent Hx: here for elective Right THR    (had left THR )  Response To Previous Treatment: Patient with no complaints from previous session.   Family / Caregiver Present: (daughter here after the initial session this morning)  Follows Commands: Within Functional

## 2020-11-17 NOTE — OP NOTE
[address] [facility]                                OPERATIVE REPORT    PATIENT NAME: Ewelina Hoff                       :        1965  MED REC NO:   [id number]                          ROOM:  ACCOUNT NO:   [de-identified]                           ADMIT DATE: 2020  PROVIDER:     Raymundo Bhat. Gustabo Wilkes MD    DATE OF PROCEDURE:  2020    PREOPERATIVE DIAGNOSES:  Severe degenerative osteoarthritis of the right  hip with a high class III morbid obesity. POSTOPERATIVE DIAGNOSES:  Severe degenerative osteoarthritis of the  right hip with a high class III morbid obesity. OPERATION PERFORMED:  Right total hip arthroplasty. SURGEON:  Raymundo Bhat. Gustabo Wilkes MD    ASSISTANTS:  ESTEFANY Melendez; surgeon also present, Dr. Katie Quevedo. BLOOD LOSS: 300 mL  INDICATIONS:  The patient is a 49-year-old female who has severe  end-stage degenerative osteoarthritis. She is about five years postop  contralateral total hip performed by myself, which appears to be doing  reasonably well. She comes for right total hip arthroplasty. This  patient secondary to her severe degenerative disease and her class III  morbid obesity, BMI of 40.72, this surgical procedure took 45 minutes  longer to perform and all of this time was spent in the actual  performance of the arthroplasty and somewhat prolonged closure of the  wound. OPERATIVE PROCEDURE:  The patient was brought to the operating room. Once anesthetic was obtained and intravenous antibiotics delivered, the  patient was placed in a lateral decubitus position. Her right leg and  foot were prepped and draped in a sterile fashion. Direct lateral approach was performed. Skin and subcutaneous tissue  were divided down to the iliotibial band. This was split along the  lines of the incision, exposing the greater trochanter.   Direct lateral  approach was performed and the severe  degenerative disease, this surgical procedure took us 45 minutes longer  to perform and all of this time was spent in the performance of the  arthroplasty and closure of the wound. NATHANIEL Oakley MD    D: 11/16/2020 13:48:49       T: 11/16/2020 15:34:38     [id number]  Job#: [id number]     Doc#: [id number]    CC:

## 2020-11-17 NOTE — PLAN OF CARE
Problem: Cardiac:  Goal: Ability to maintain vital signs within normal range will improve  Description: Ability to maintain vital signs within normal range will improve  Outcome: Met This Shift  Note: Vital signs stable, respiratory rate normal, heart rate is WNLs and regular. +2 pulses and less than 3 second cap refill noted in all extremities, body color appropriate for ethnicity and temperature warm, Will continue to monitor and reassess. Problem: Discharge Planning:  Goal: Knowledge of discharge instructions  Description: Knowledge of discharge instructions  Outcome: Ongoing  Note: She was unable to discharge today and after visit summary has not been done, she did do the JET class and is able to understand teaching. Problem: Infection - Surgical Site:  Goal: Signs of wound healing will improve  Description: Signs of wound healing will improve  Outcome: Met This Shift  Note: Dressing to the right hip is clean, dry, intact, the incision is well approximated      Problem: Mobility - Impaired:  Goal: Achieve maximum mobility level  Description: Achieve maximum mobility level  Outcome: Met This Shift  Note: Patient has gotten up several times to go to the bathroom, and she has worked with physical and occupational therapies this shift. Problem: Pain - Acute:  Goal: Pain level will decrease  Description: Pain level will decrease  Outcome: Ongoing  Note: Patient has been give pain medication as appropriate however she has pain issues this shift. Problem: Pain:  Goal: Pain level will decrease  Description: Pain level will decrease  Outcome: Ongoing  Note: Patient has been give pain medication as appropriate however she has pain issues this shift.    Goal: Control of acute pain  Description: Control of acute pain  Outcome: Ongoing  Note: Patient has been assessed for pain on a regular bases, patient has been given pain medication as appropriate, patient has been reassessed for pain after medication has been administered  Goal: Control of chronic pain  Description: Control of chronic pain  Outcome: Ongoing  Note: Patient is alert and oriented X4. Patient is able to identify the sensation of pain and communicate the need for pain medication appropriately. Problem: Falls - Risk of:  Goal: Will remain free from falls  Description: Will remain free from falls  Outcome: Met This Shift  Note: No falls noted this shift. Patient ambulates with x2 staff assist and contact guard with a walker. Bed kept in low position. Safe environment maintained. Bedside table & call light in reach. Uses call light appropriately when needing assistance. Goal: Absence of physical injury  Description: Absence of physical injury  Outcome: Met This Shift  Note: No physical injury noted this shift. Patient has been assessed for fall risk, fall precautions are in place, environment has been cleared of obstacles and reassessed during rounding, bed is in lowest position with the wheels locked, call light is within reach.  ID band has been verified, appropriate transfer methods have been utilized and patient has been educated on safety, bed and chair alarms utilized      Problem: Skin Integrity:  Goal: Will show no infection signs and symptoms  Description: Will show no infection signs and symptoms  Outcome: Met This Shift  Goal: Absence of new skin breakdown  Description: Absence of new skin breakdown  Outcome: Met This Shift

## 2020-11-17 NOTE — CARE COORDINATION
Pari/Eitan received referral from [address] [place] [address] for [employer]. Will need DME Orders. Will verify patient's insurance and follow up with patient to deliver the ordered item(s) prior to discharge.     Thank you for the referral.  Electronically signed by Rogelio Ortiz on 11/17/2020 at 8:59 AM  Cell ph# [phone]

## 2020-11-17 NOTE — PLAN OF CARE
Problem: Cardiac:  Goal: Ability to maintain vital signs within normal range will improve  Description: Ability to maintain vital signs within normal range will improve  Outcome: Ongoing  Note: Monitoring vitals. Problem: Discharge Planning:  Goal: Knowledge of discharge instructions  Description: Knowledge of discharge instructions  Outcome: Ongoing  Note: Patient will be educated on discharge instructions       Problem: Infection - Surgical Site:  Goal: Signs of wound healing will improve  Description: Signs of wound healing will improve  Outcome: Ongoing  Note: Pt is free of signs and symptoms of infection. Incision and dressing are clean, dry and intact. Vital signs stable. Will monitor. Problem: Mobility - Impaired:  Goal: Achieve maximum mobility level  Description: Achieve maximum mobility level  Outcome: Ongoing  Note: Patients mobility will improve       Problem: Pain - Acute:  Goal: Pain level will decrease  Description: Pain level will decrease  Outcome: Ongoing  Note: Pt assessed for pain. Pt in pain and assessed with 0-10 pain rating scale. Pt given prescribed analgesic for pain. (See eMar) Pt satisfied with pain relief thus far. Will reassess and continue to monitor. Problem: Pain:  Goal: Pain level will decrease  Description: Pain level will decrease  Outcome: Ongoing  Note: Pt assessed for pain. Pt in pain and assessed with 0-10 pain rating scale. Pt given prescribed analgesic for pain. (See eMar) Pt satisfied with pain relief thus far. Will reassess and continue to monitor. Goal: Control of acute pain  Description: Control of acute pain  Outcome: Ongoing  Note: Pt assessed for pain. Pt in pain and assessed with 0-10 pain rating scale. Pt given prescribed analgesic for pain. (See eMar) Pt satisfied with pain relief thus far. Will reassess and continue to monitor.      Goal: Control of chronic pain  Description: Control of chronic pain  Outcome: Ongoing     Problem: Falls - Risk of:  Goal: Will remain free from falls  Description: Will remain free from falls  Outcome: Ongoing  Note: Fall risk assessment completed. Fall precautions in place. Call light within reach. Pt educated on calling for assistance before getting up. Walkway free of clutter. Will continue to monitor. Goal: Absence of physical injury  Description: Absence of physical injury  Outcome: Ongoing  Note: Pt is free of injury. No injury noted. Fall precautions in place. Call light within reach. Will monitor. Problem: Skin Integrity:  Goal: Will show no infection signs and symptoms  Description: Will show no infection signs and symptoms  Outcome: Ongoing  Note: Pt is free of signs and symptoms of infection. Incision and dressing are clean, dry and intact. Vital signs stable. Will monitor.      Goal: Absence of new skin breakdown  Description: Absence of new skin breakdown  Outcome: Ongoing  Note: Patient will be absent of new skin breakdown     Electronically signed by Mian Bruno RN on 11/16/2020 at 11:00 PM

## 2020-11-17 NOTE — PROGRESS NOTES
Patient calling out to go to the bathroom. RN got to room, patient was shaking and crying. Patient was unable to stand up at this time due to pain. PRN morphine was given per order(see MAR). Patient clamed down and was able to get onto the bedside commode with a two person assist. Patient walked back to the bed with the walker at  her request. Will continue to monitor.    Electronically signed by Kimberly Baeza RN on 11/17/2020 at 4:02 AM

## 2020-11-17 NOTE — PROGRESS NOTES
Patient is resting in bed. Alert and oriented X4. Complaining of 10/10 pain, but snoring during assessment. Medication given (see MAR). Patients PO intake is adequate, no N/V. Patient able to ambulate to the bathroom X1 with a walker, patient tolerated well. Right hip dressing remains clean, dry, and intact. ICE, SHELBIE hose, and SCD applied. Peripheral vascular check WDL. Assessment complete. All patient needs are met at this time. Fall precautions are in place. Call light is in reach. Will continue to monitor.     Electronically signed by Kezia Cool RN on 11/16/2020 at 11:03 PM

## 2020-11-17 NOTE — PROGRESS NOTES
Clinical Pharmacy Note  Medication Counseling    Reviewed new medications started during hospital admission: Eliquis, oxycodone, cyclobenzaprine. Indications and side effects were emphasized during counseling. All medication-related questions addressed. Patient verbalized understanding of education. Should the patient express any additional questions or concerns regarding their medications, please do not hesitate to contact the pharmacy department. Patient/caregiver aware they may refuse medications during hospital stay. 10 minutes spent educating patient regarding medications.       Reyna Norton, PharmD Candidate [id number]  11/17/20 8:00AM

## 2020-11-17 NOTE — CARE COORDINATION
11/17 met with patient at bedside to discuss dc plan. As per JET - patient plans to stay with her daughter Katy Hernandez for a short while where her daughter can assist with her care. Daughter - Naomie Bernal  [address] [facility] [address]  Ph # [phone]  Back up ph # [phone]    Agreeable to home care --prefers [address] with [facility] notified of referral.  Thomas Martin with [employer] aware of need for walker.   Daughter to transport home at [place] signed by Mary Ann Jhaveri on 11/17/2020 at 9:55 AM

## 2020-11-17 NOTE — CARE COORDINATION
[facility]  Received referral from case management    Faxed orders to [address] care to see patient by   11/19/2020  Wendy Mancilla LPN    [facility] CTN Cell [phone], Fax [phone]

## 2020-11-17 NOTE — PROGRESS NOTES
Huddle performed this morning including Nurse navigator Ry Anguiano, Physical therapist erick, and Occupational therapist jace. Discussed plan of care, discharge plan, and dme needs if applicable for orthopedic total joint patient.   Electronically signed by Miller Downs RN on 11/17/2020 at 10:09 AM

## 2020-11-17 NOTE — PROGRESS NOTES
Occupational Therapy   Occupational Therapy Initial Assessment  Date: 2020   Patient Name: Climmie Kehr  MRN: [id number]     : 1965    Date of Service: 2020     Assessment: Pt is a 59 yo F admitted s/p right MARY, now WBAT with anterior hip precautions. PTA, pt lives alone, reports she was independent in self-care, fxl transfers and mobility. Pt currently functioning below her baseline secondary to the above deficits. Pt also limited by anxious behavior this session and difficult to redirect at times. Pt required min Ax2 for initial sit>stand from lower surface. Pt then able to complete fxl mobility, toilet transfer with CGA/min A using RW but required cues for RW safety throughout. Pt required CGA for LB dressing, mod A for toileting, CGA for standing grooming. Pt's dtr present and assisting with some transfers, reports she and her fiancee will be able to assist the pt upon d/c and feel comfortable taking her home. Pt will require 24/7 supervision/assist upon d/c, and recommend [facility] OT L1 to ensure pt's safety and continued progress. They have a BSC over the commode at home, and grab bars in the tub. Recommend TTB in order for pt to safely complete shower transfers and dtr reports she will purchase one. Discharge Recommendations:  Patient would benefit from continued therapy after discharge, 24 hour supervision or assist, S Level 1, Home with Home health OT  OT Equipment Recommendations  Equipment Needed: Yes  Mobility Devices: ADL Assistive Devices  ADL Assistive Devices: Transfer [address] scored a 18/24 on the [address] form. Current research shows that an AM-PAC score of 18 or greater is typically associated with a discharge to the patient's home setting. Based on the patient's AM-PAC score, and their current ADL deficits, it is recommended that the patient have 2-3 sessions per week of Occupational Therapy at d/c to increase the patient's independence.   At this History  Social/Functional History  Lives With: Alone  Type of Home: House  Home Layout: One level, Laundry in basement, Able to Live on Main level with bedroom/bathroom  Home Access: Stairs to enter without rails  Entrance Stairs - Number of Steps: 2  Bathroom Shower/Tub: Tub/Shower unit  Bathroom Toilet: Standard  Bathroom Equipment: Grab bars in shower, Toilet raiser  Bathroom Accessibility: Accessible  Home Equipment: Rolling walker, Cane, Sock aid, Long-handled shoehorn  ADL Assistance: Independent  Homemaking Assistance: Independent  Ambulation Assistance: Independent(with cane)  Transfer Assistance: Independent  Active : Yes  Additional Comments: per LUCY, DC plan is home o dtr's home. Uncertain if above is patient's or dtr's home. PMHx of L THR 8-. Objective        Orientation  Overall Orientation Status: Within Functional Limits     Balance  Sitting Balance: Stand by assistance  Standing Balance: Contact guard assistance  Functional Mobility  Functional - Mobility Device: Rolling Walker  Activity: Other  Assist Level: Minimal assistance  Functional Mobility Comments: pt completed fxl mobility from arm chair > commode > bed CGA/min A using RW. significantly extra time, cues for RW safety throughout  Toilet Transfers  Toilet - Technique: Ambulating  Equipment Used: Raised toilet seat with rails  Toilet Transfer: Contact guard assistance  Toilet Transfers Comments: cues for RW safety, pt able to push up from rails of BSC with CGA  ADL  Grooming: Contact guard assistance(pt washed hands in stance at the sink)  LE Dressing: Contact guard assistance; Increased time to complete;Verbal cueing(pt practiced using reacher to don hospital pants after educated in tech. to don R LE first. pt reports she has been doing it a certain way for 4 yrs, able to don by threading LLE first, then used reacher for R LE while maintaining precautions)  Toileting:  Moderate assistance(pt required assist for pants up/down, pt performed hygiene with CGA for balance)  Additional Comments: anticipate mod I for feeding, setup for UB bathing and dressing, mod A for LB bathing based on ROM, strength, endurance this session        Bed mobility  Sit to Supine: Minimal assistance  Transfers  Sit to stand: Minimal assistance;2 Person assistance  Stand to sit: Minimal assistance  Transfer Comments: pt required min Ax2 for sit>stand from high back arm chair (lower surface) pt demo unsafe technique pushing down on RW to stand but refused to attempt any other way. required multiple trials, pt pushing back and appeared very anxious     Cognition  Overall Cognitive Status: Exceptions  Cognition Comment: pt states several times she is having a panic attack - RN in to administer meds.  pt is resistant to changing ways and some unsafe strategies for fxl transfers and ADLs                 LUE AROM (degrees)  LUE AROM : WFL  Left Hand AROM (degrees)  Left Hand AROM: WFL  RUE AROM (degrees)  RUE AROM : WFL  Right Hand AROM (degrees)  Right Hand AROM: WFL  LUE Strength  Gross LUE Strength: WFL  RUE Strength  Gross RUE Strength: WFL                   Plan   Plan  Times per week: 3-5  Times per day: Daily  Current Treatment Recommendations: Strengthening, Functional Mobility Training, Endurance Training, ROM, Balance Training, Safety Education & Training, Self-Care / ADL, Equipment Evaluation, Education, & procurement, Patient/Caregiver Education & Training    AM-PAC Score        AM-[facility] Inpatient Daily Activity Raw Score: 18 (11/17/20 1201)  AM-PAC Inpatient ADL T-Scale Score : 38.66 (11/17/20 1201)  ADL Inpatient CMS 0-100% Score: 46.65 (11/17/20 1201)  ADL Inpatient CMS G-Code Modifier : CK (11/17/20 1201)    Goals  Short term goals  Time Frame for Short term goals: prior to d/c  Short term goal 1: Pt will complete bed mobility SBA  Short term goal 2: Pt will complete fxl transfers SBA  Short term goal 3: Pt will toilet SBA  Short term goal 4: Pt will groom in stance at sink SBA  Short term goal 5: Pt will dress SBA while maintaining hip precautions  Patient Goals   Patient goals : to go home       Therapy Time   Individual Concurrent Group Co-treatment   Time In [address]         Time Out 1145         Minutes [address] [place], OTR/L [address]

## 2020-11-17 NOTE — PROGRESS NOTES
Data- discharge order received, patient verbalized agreement to discharge, disposition to previous residence, needs noted for HHC/DME and informed Jaimie Silva NP. Action- discharge instructions prepared and given to patient, patient verbalized understanding. Medication information packet given r/t NEW and/or CHANGED prescriptions emphasizing name/purpose/side effects, pt verbalized understanding. Discharge instruction summary: Diet- general, Activity- wbat, Primary Care Physician as followsMattchucky Ardon -[phone]. f/u appointment with orthopedic office noted below, immunizations reviewed and discussed with patient, prescription medications to be filled by retail pharmacy and then delivered. Inpatient surgical procedure precautions reviewed: anterior hip precautions. Neurovascular check performed and patient is WNLs, denies numbness/tingling in extremties. Incision site  prineo glue dressing assessed and is  clean,dry, and intact, no signs of redness, drainage, or odor noted. patient's bedside RN stephanie notified of patient completing discharge instructions. Response- Medications to be delivered to patient via meds to bed program. Disposition is home with Larry Guillory (DME delivered to patient by Rhiannon Wall with [employer]), to be transported with family, no complications.      Future Appointments   Date Time Provider Rima Lopez   11/30/2020 10:15 AM MD Ellen Bansal MMA           Electronically signed by Tea Johnson RN on 11/18/2020 at 9:55 AM

## 2020-11-17 NOTE — CARE COORDINATION
[facility] delivered requested 2-Wheeled [place] to patient and reviewed insurance coverage and equipment set up with patient. Notified RN.     Thank you for the referral.  Electronically signed by Torrey Oro on 11/17/2020 at 11:04 AM Cell ph# [phone]

## 2020-11-18 VITALS
HEART RATE: 118 BPM | TEMPERATURE: 98.3 F | WEIGHT: 275.35 LBS | SYSTOLIC BLOOD PRESSURE: 134 MMHG | RESPIRATION RATE: 16 BRPM | HEIGHT: 67 IN | BODY MASS INDEX: 43.22 KG/M2 | DIASTOLIC BLOOD PRESSURE: 69 MMHG | OXYGEN SATURATION: 92 %

## 2020-11-18 LAB
GLUCOSE BLD-MCNC: 165 MG/DL (ref 70–99)
PERFORMED ON: ABNORMAL

## 2020-11-18 PROCEDURE — 97116 GAIT TRAINING THERAPY: CPT

## 2020-11-18 PROCEDURE — 97530 THERAPEUTIC ACTIVITIES: CPT

## 2020-11-18 PROCEDURE — 94760 N-INVAS EAR/PLS OXIMETRY 1: CPT

## 2020-11-18 PROCEDURE — 6360000002 HC RX W HCPCS: Performed by: ORTHOPAEDIC SURGERY

## 2020-11-18 PROCEDURE — 6370000000 HC RX 637 (ALT 250 FOR IP): Performed by: NURSE PRACTITIONER

## 2020-11-18 PROCEDURE — 97110 THERAPEUTIC EXERCISES: CPT

## 2020-11-18 PROCEDURE — 6370000000 HC RX 637 (ALT 250 FOR IP): Performed by: ORTHOPAEDIC SURGERY

## 2020-11-18 PROCEDURE — 97535 SELF CARE MNGMENT TRAINING: CPT

## 2020-11-18 RX ORDER — DIAZEPAM 2 MG/1
2 TABLET ORAL EVERY 8 HOURS PRN
Qty: 15 TABLET | Refills: 0 | Status: SHIPPED | OUTPATIENT
Start: 2020-11-18 | End: 2020-11-23

## 2020-11-18 RX ADMIN — INSULIN LISPRO 1 UNITS: 100 INJECTION, SOLUTION INTRAVENOUS; SUBCUTANEOUS at 08:39

## 2020-11-18 RX ADMIN — LEVOTHYROXINE SODIUM 300 MCG: 0.1 TABLET ORAL at 08:38

## 2020-11-18 RX ADMIN — OXYCODONE HYDROCHLORIDE 10 MG: 10 TABLET ORAL at 08:33

## 2020-11-18 RX ADMIN — DOCUSATE SODIUM 50 MG AND SENNOSIDES 8.6 MG 1 TABLET: 8.6; 5 TABLET, FILM COATED ORAL at 08:33

## 2020-11-18 RX ADMIN — ENOXAPARIN SODIUM 30 MG: 30 INJECTION SUBCUTANEOUS at 08:33

## 2020-11-18 RX ADMIN — OXYBUTYNIN CHLORIDE 10 MG: 10 TABLET, EXTENDED RELEASE ORAL at 08:33

## 2020-11-18 RX ADMIN — HYDROXYCHLOROQUINE SULFATE 200 MG: 200 TABLET ORAL at 08:33

## 2020-11-18 RX ADMIN — TRIAMTERENE AND HYDROCHLOROTHIAZIDE 1 TABLET: 75; 50 TABLET ORAL at 08:33

## 2020-11-18 ASSESSMENT — PAIN SCALES - GENERAL
PAINLEVEL_OUTOF10: 7
PAINLEVEL_OUTOF10: 7

## 2020-11-18 ASSESSMENT — PAIN DESCRIPTION - FREQUENCY: FREQUENCY: CONTINUOUS

## 2020-11-18 ASSESSMENT — PAIN DESCRIPTION - LOCATION: LOCATION: HIP

## 2020-11-18 ASSESSMENT — PAIN DESCRIPTION - ORIENTATION: ORIENTATION: RIGHT

## 2020-11-18 ASSESSMENT — PAIN DESCRIPTION - PROGRESSION: CLINICAL_PROGRESSION: NOT CHANGED

## 2020-11-18 ASSESSMENT — PAIN DESCRIPTION - ONSET: ONSET: ON-GOING

## 2020-11-18 ASSESSMENT — PAIN - FUNCTIONAL ASSESSMENT: PAIN_FUNCTIONAL_ASSESSMENT: PREVENTS OR INTERFERES SOME ACTIVE ACTIVITIES AND ADLS

## 2020-11-18 ASSESSMENT — PAIN DESCRIPTION - DESCRIPTORS: DESCRIPTORS: ACHING

## 2020-11-18 ASSESSMENT — PAIN DESCRIPTION - PAIN TYPE: TYPE: SURGICAL PAIN

## 2020-11-18 NOTE — PROGRESS NOTES
Pt resting in bed. A&Ox4. Pt c/o right hip pain overnight, pain meds given per orders. No other complaints at this time. Fall precautions in place, call light and bedside table within reach.

## 2020-11-18 NOTE — PLAN OF CARE
Problem: Cardiac:  Goal: Ability to maintain vital signs within normal range will improve  Description: Ability to maintain vital signs within normal range will improve  11/18/2020 1051 by Doreen Leyva RN  Outcome: Completed  11/18/2020 0710 by Doreen Leyva RN  Outcome: Ongoing  Note: Ability to maintain vital signs within normal range will improve. Electronically signed by Doreen Leyva RN on 11/18/2020 at 7:11 AM    11/18/2020 0209 by Kaden Maria RN  Outcome: Ongoing     Problem: Discharge Planning:  Goal: Knowledge of discharge instructions  Description: Knowledge of discharge instructions  11/18/2020 1051 by Doreen Leyva RN  Outcome: Completed  11/18/2020 0710 by Doreen Leyva RN  Outcome: Ongoing  Note: Knowledge of discharge instructions. Electronically signed by Doreen Leyva RN on 11/18/2020 at 7:11 AM    11/18/2020 0209 by Kaden Maria RN  Outcome: Ongoing     Problem: Infection - Surgical Site:  Goal: Signs of wound healing will improve  Description: Signs of wound healing will improve  11/18/2020 1051 by Doreen Leyva RN  Outcome: Completed  11/18/2020 0710 by Doreen Leyva RN  Outcome: Ongoing  Note: Signs of wound healing will improve. Electronically signed by Doreen Leyva RN on 11/18/2020 at 7:11 AM    11/18/2020 0209 by Kaden Maria RN  Outcome: Ongoing     Problem: Mobility - Impaired:  Goal: Achieve maximum mobility level  Description: Achieve maximum mobility level  11/18/2020 1051 by Doreen Leyva RN  Outcome: Completed  11/18/2020 0710 by Doreen Leyva RN  Outcome: Ongoing  Note: Achieve maximum mobility level.   Electronically signed by Doreen Leyva RN on 11/18/2020 at 7:12 AM    11/18/2020 0209 by Kaden Maria RN  Outcome: Ongoing     Problem: Pain - Acute:  Goal: Pain level will decrease  Description: Pain level will decrease  11/18/2020 1051 by Doreen Leyva RN  Outcome: Completed  11/18/2020 0710 by Doreen Leyva RN  Outcome: Ongoing  Note: Pain level will decrease. Electronically signed by Angela Charles RN on 11/18/2020 at 7:12 AM    11/18/2020 0209 by Raleigh Grimes RN  Outcome: Ongoing     Problem: Pain:  Goal: Pain level will decrease  Description: Pain level will decrease  11/18/2020 1051 by Angela Charles RN  Outcome: Completed  11/18/2020 0710 by Angela Charles RN  Outcome: Ongoing  Note: Pain level will decrease. Electronically signed by Angela Charles RN on 11/18/2020 at 7:12 AM    11/18/2020 0209 by Raleigh Grimes RN  Outcome: Ongoing  Goal: Control of acute pain  Description: Control of acute pain  11/18/2020 1051 by Angela Charles RN  Outcome: Completed  11/18/2020 0710 by Angela Charles RN  Outcome: Ongoing  Note: Control of acute pain. Electronically signed by Angela Charles RN on 11/18/2020 at 7:12 AM    11/18/2020 0209 by Raliegh Grimes RN  Outcome: Ongoing  Goal: Control of chronic pain  Description: Control of chronic pain  11/18/2020 1051 by Angela Charles RN  Outcome: Completed  11/18/2020 0710 by Angela Charles RN  Outcome: Ongoing  Note: Control of chronic pain. Electronically signed by Angela Charles RN on 11/18/2020 at 7:12 AM    11/18/2020 0209 by Raleigh Grimes RN  Outcome: Ongoing     Problem: Falls - Risk of:  Goal: Will remain free from falls  Description: Will remain free from falls  11/18/2020 1051 by Angela Charles RN  Outcome: Completed  11/18/2020 0710 by Angela Charles RN  Outcome: Ongoing  Note: Will remain free from falls. Electronically signed by Angela Charles RN on 11/18/2020 at 7:12 AM    11/18/2020 0209 by Raleigh Grimes RN  Outcome: Ongoing  Goal: Absence of physical injury  Description: Absence of physical injury  11/18/2020 1051 by Angela Charles RN  Outcome: Completed  11/18/2020 0710 by Angela Charles RN  Outcome: Ongoing  Note: Absence of physical injury.   Electronically signed by Angela Charles RN on 11/18/2020 at 7:13 AM    11/18/2020 0209 by Raleigh Grimes, RN  Outcome: Ongoing     Problem: Skin Integrity:  Goal: Will show no infection signs and symptoms  Description: Will show no infection signs and symptoms  11/18/2020 1051 by Drea Copeland RN  Outcome: Completed  11/18/2020 0710 by Drea Copeland RN  Outcome: Ongoing  Note: Will show no signs/symptoms infection. Electronically signed by Drea Copeland RN on 11/18/2020 at 7:13 AM    11/18/2020 0209 by Akosua Aguillon RN  Outcome: Ongoing  Goal: Absence of new skin breakdown  Description: Absence of new skin breakdown  11/18/2020 1051 by Drea Copeland RN  Outcome: Completed  11/18/2020 0710 by Drea Copeland RN  Outcome: Ongoing  Note: Absence of new skin breakdown.   Electronically signed by Drea Copeland RN on 11/18/2020 at 7:13 AM    11/18/2020 0209 by Akosua Aguillon RN  Outcome: Ongoing

## 2020-11-18 NOTE — PLAN OF CARE
however she has pain issues this shift. Problem: Pain:  Goal: Pain level will decrease  Description: Pain level will decrease  11/18/2020 0209 by Marquise Stone RN  Outcome: Ongoing  11/17/2020 1755 by Lobito Montoya RN  Outcome: Ongoing  Note: Patient has been give pain medication as appropriate however she has pain issues this shift. Goal: Control of acute pain  Description: Control of acute pain  11/18/2020 0209 by Marquise Stone RN  Outcome: Ongoing  11/17/2020 1755 by Lobito Montoya RN  Outcome: Ongoing  Note: Patient has been assessed for pain on a regular bases, patient has been given pain medication as appropriate, patient has been reassessed for pain after medication has been administered  Goal: Control of chronic pain  Description: Control of chronic pain  11/18/2020 0209 by Marquise Stone RN  Outcome: Ongoing  11/17/2020 1755 by Lobito Montoya RN  Outcome: Ongoing  Note: Patient is alert and oriented X4. Patient is able to identify the sensation of pain and communicate the need for pain medication appropriately. Problem: Falls - Risk of:  Goal: Will remain free from falls  Description: Will remain free from falls  11/18/2020 0209 by Marquise Stone RN  Outcome: Ongoing  11/17/2020 1755 by Lobito Montoya RN  Outcome: Met This Shift  Note: No falls noted this shift. Patient ambulates with x2 staff assist and contact guard with a walker. Bed kept in low position. Safe environment maintained. Bedside table & call light in reach. Uses call light appropriately when needing assistance. Goal: Absence of physical injury  Description: Absence of physical injury  11/18/2020 0209 by Marquise Stone RN  Outcome: Ongoing  11/17/2020 1755 by Lobito Montoya RN  Outcome: Met This Shift  Note: No physical injury noted this shift.  Patient has been assessed for fall risk, fall precautions are in place, environment has been cleared of obstacles and reassessed during rounding, bed is in lowest position with the wheels locked, call light is within reach.  ID band has been verified, appropriate transfer methods have been utilized and patient has been educated on safety, bed and chair alarms utilized      Problem: Skin Integrity:  Goal: Will show no infection signs and symptoms  Description: Will show no infection signs and symptoms  11/18/2020 0209 by Alice Randhawa RN  Outcome: Ongoing  11/17/2020 1755 by Alisia Bowers RN  Outcome: Met This Shift  Goal: Absence of new skin breakdown  Description: Absence of new skin breakdown  11/18/2020 0209 by Alice Randhawa RN  Outcome: Ongoing  11/17/2020 1755 by Alisia Bowers RN  Outcome: Met This Shift

## 2020-11-18 NOTE — PROGRESS NOTES
Huddle performed this morning including Nurse navigator Rafaela Art, Physical therapist erick, and Occupational therapist everette. Discussed plan of care, discharge plan, and dme needs if applicable for orthopedic total joint patient.

## 2020-11-18 NOTE — PROGRESS NOTES
Pt assisted to bathroom not tolerating well, therapy now in room and assisting with ambulating back to chair, pt rates pain 7/10 oxy given at this time, ice pack placed on hip, call light in reach.   Electronically signed by Myles Vicente RN on 11/18/2020 at 10:47 AM

## 2020-11-18 NOTE — PROGRESS NOTES
Physical Therapy    Facility/Department: [facility]  Treatment note    NAME: Rosendo Brooke  : 1965  MRN: [id number]    Date of Service: 2020    Assessment / Discharge Recommendations:  -managing her mobility with great difficulty  -her son in law here to observe her mobility with transfers and ambulation  -as not able to attempt the 2 steps as needed to enter home he will obtain a wheelchair and with assist of 2 others 'bump' her up the 2 steps at home  -reviewed and reinforced need to follow hip precautions and she verbalized understanding  -recommend high frequency of session for Home PT initially  -at conclusion, patient and her son in law indicate they are able to manage her transport home and into the house    Body structures, Functions, Activity limitations: Decreased functional mobility ; Decreased ADL status; Decreased balance;Decreased ROM; Decreased strength;Decreased endurance  Activity Tolerance  Activity Tolerance: Patient limited by fatigue;Patient limited by pain       Patient Diagnosis(es): Diagnoses of Arthritis of right hip and Pain of both hip joints were pertinent to this visit. has a past medical history of Anemia, Anesthesia complication, Anxiety and depression, Chronic back pain, Condyloma acuminatum, Headache(784.0), Hyperlipidemia, Hypertension, Hypothyroidism, IBS (irritable bowel syndrome), Metrorrhagia, Neuropathy, Osteoarthritis, Urgency of urination, and Wears glasses. has a past surgical history that includes [place] tooth extraction; Total hip arthroplasty (Left, 2016); LEEP; and Total hip arthroplasty (Right, 2020).     Restrictions  Restrictions/Precautions  Restrictions/Precautions: Fall Risk  Position Activity Restriction  Hip Precautions: No hip flexion > 90 degrees, No hip extension, No hip external rotation  Other position/activity restrictions: WBAT  Social/Functional History  Social/Functional History  Lives With: Alone  Type of Home: House  Home Layout: One level, Laundry in basement, Able to Live on Main level with bedroom/bathroom  Home Access: Stairs to enter without rails  Entrance Stairs - Number of Steps: 2  Bathroom Shower/Tub: Tub/Shower unit  Bathroom Toilet: Standard  Bathroom Equipment: Grab bars in shower, Toilet raiser  Bathroom Accessibility: Accessible  Home Equipment: Rolling walker, Cane, Sock aid, Long-handled shoehorn  ADL Assistance: Independent  Homemaking Assistance: Independent  Ambulation Assistance: Independent(with cane)  Transfer Assistance: Independent  Active : Yes  Additional Comments: cash AYALA DC plan is home o dtr's home. Uncertain if above is patient's or dtr's home. PMHx of L THR 8-. Bed mobility  Supine to Sit: Minimal assistance  Sit to Supine: Minimal assistance  Transfers  Sit to Stand: Minimal Assistance; Moderate Assistance  Stand to sit: Minimal Assistance  Ambulation  Ambulation?: Yes  Ambulation 1  Surface: level tile  Device: Rolling Walker  Assistance: Minimal assistance  Quality of Gait: remains poor with difficulty advancing the right LE and bearing weight  Distance: she is tolerant for ambulating about 10-15 feet at this time  Stairs/Curb  Stairs? : (attempted but she states not able to do this today)  Balance  Comments: she transfers in unorthodox manner but no elie LOB - recommended she have an assist to stabilize her walker  Exercises  Ankle Pumps: 30 per hour in easy pace  Comments: deferred additional exercises to the [facility]  Times per week: 1-4 sessions  Current Treatment Recommendations: Transfer Training, Patient/Caregiver Education & Training, ADL/Self-care Training, Modalities, Gait Training, Positioning, Stair training  Safety Devices  Type of devices:  All fall risk precautions in place, Bed alarm in place, Call light within reach, Left in bed, Nurse notified(at last session she concluded to supine in bed)    Goals  Short term goals  Time Frame for Short

## 2020-11-18 NOTE — PROGRESS NOTES
extraction; Total hip arthroplasty (Left, 08/16/2016); LEEP; and Total hip arthroplasty (Right, 11/16/2020). Restrictions  Restrictions/Precautions  Restrictions/Precautions: Fall Risk  Position Activity Restriction  Hip Precautions: No hip flexion > 90 degrees, No hip extension, No hip external rotation  Other position/activity restrictions: WBAT  Subjective   General  Chart Reviewed: Yes  Patient assessed for rehabilitation services?: Yes  Additional Pertinent Hx: Pt is a 58yo F admitted with Severe degenerative osteoarthritis of the right hip with a high class III morbid obesity. s/p right hip arthroplasty 11/16/2020 with anterior hip precautions  Response to previous treatment: Patient with no complaints from previous session  Family / Caregiver Present: Yes(son in law)  Referring Practitioner: Ana Maria Guthrie  Diagnosis: Severe degenerative osteoarthritis of the right hip  Subjective  Subjective: Patient seated in recliner chair upon arrival to room. Patient agreeable to therapy.  Reports pain 5/10, ice placed at end of session      Orientation  Orientation  Overall Orientation Status: Within Functional Limits  Objective    ADL  Grooming: Setup(seated in recliner chair to brush hair, declined further grooming tasks)  UE Dressing: None(declined changing top)  LE Dressing: Contact guard assistance(CGA with use of reacher to thread clothing over feet, SBA for balance for clothing over hips)        Balance  Sitting Balance: Supervision  Standing Balance  Activity: Static standing with RW  Functional Mobility  Functional - Mobility Device: Rolling Walker  Activity: Other  Assist Level: Contact guard assistance  Functional Mobility Comments: Functional mobility short distance in room with CGA, no overt LOB noted  Bed mobility  Supine to Sit: Unable to assess  Sit to Supine: Unable to assess  Comment: up in chair at start and of session  Transfers  Sit to stand: Stand by assistance  Stand to sit: Stand by assistance  Transfer Comments: SBA for sit<> stand to RW from recliner chair to recliner chair per patients unique tech, no LOB noted        Cognition  Overall Cognitive Status: Exceptions  Following Commands: Follows all commands without difficulty  Attention Span: Attends with cues to redirect  Safety Judgement: Decreased awareness of need for safety  Cognition Comment: pt is resistant to changing ways and some unsafe strategies for fxl transfers and ADLs         Assessment   Performance deficits / Impairments: Decreased functional mobility ; Decreased ADL status; Decreased safe awareness;Decreased balance;Decreased high-level IADLs  Assessment: Discussed with OTR am pac score is 20. Anticipate that patient will be safe to return home with family to provide 24 hour assist and home OT level 3. Patient able to complete sit<>stand from recliner chair to RW to recliner chair per patients unique tech. Functional mobility with RW short distance with CGA. Dressed lower body with CGA. Plan is for discharge to home today. OT Education: OT Role;Plan of Care;ADL Adaptive Strategies;Transfer Training;Equipment; Energy Conservation  REQUIRES OT FOLLOW UP: Yes  Activity Tolerance  Activity Tolerance: Patient limited by pain  Safety Devices  Safety Devices in place: Yes  Type of devices: Call light within reach; Left in chair;Nurse notified         Plan   Plan  Times per week: plan is for discharge to home today  Times per day: Daily  Current Treatment Recommendations: Strengthening, Functional Mobility Training, Endurance Training, ROM, Balance Training, Safety Education & Training, Self-Care / ADL, Equipment Evaluation, Education, & procurement, Patient/Caregiver Education & Training    AM-PAC Score        AM-PAC Inpatient Daily Activity Raw Score: 20 (11/18/20 1032)  AM-PAC Inpatient ADL T-Scale Score : 42.03 (11/18/20 1032)  ADL Inpatient CMS 0-100% Score: 38.32 (11/18/20 1032)  ADL Inpatient CMS G-Code Modifier : Stefany Crutch (11/18/20 1032)    Goals  Short term goals  Time Frame for Short term goals: prior to d/c: all goals ongoing  Short term goal 1: Pt will complete bed mobility SBA  Short term goal 2: Pt will complete fxl transfers SBA  Short term goal 3: Pt will toilet SBA  Short term goal 4: Pt will groom in stance at sink SBA  Short term goal 5: Pt will dress SBA while maintaining hip precautions  Patient Goals   Patient goals : to go home       Therapy Time   Individual Concurrent Group Co-treatment   Time In 1010         Time Out 1048         Minutes 38                 Electronically signed by BROOK [id number] on 11/18/2020 at 10:47 AM

## 2020-11-18 NOTE — PROGRESS NOTES
[facility] Orthopedic Surgery   Progress Note      S/P :  SUBJECTIVE  Up in chair. Alert and oriented. States pain less severe in right hip and thigh than yesterday. States slept well last night. . Pain is   described in right hip and with the intensity of moderate. Pain is described as aching. OBJECTIVE              Physical                      VITALS:  /69   Pulse 118   Temp 98.3 °F (36.8 °C) (Oral)   Resp 16   Ht 5' 7\" (1.702 m)   Wt 275 lb 5.7 oz (124.9 kg)   LMP 01/27/2015   SpO2 92%   BMI 43.13 kg/m²                     MUSCULOSKELETAL:  right foot NVI. Wiggles toes to command. Pedal pulses are palpable. NEUROLOGIC:                                  Sensory:  Touch:  Right Lower Extremity:  normal                                                 Surgical wound appears clean and dry right hip with Prineo. SHELBIE hose on. Ice pack on.      Data       CBC:   Lab Results   Component Value Date    WBC 9.5 11/09/2020    RBC 4.92 11/09/2020    HGB 14.2 11/17/2020    HCT 42.1 11/17/2020    MCV 92.4 11/09/2020    MCH 31.2 11/09/2020    MCHC 33.8 11/09/2020    RDW 15.0 11/09/2020     11/09/2020    MPV 10.3 11/09/2020        WBC:    Lab Results   Component Value Date    WBC 9.5 11/09/2020        Hemoglobin/Hematocrit:    Lab Results   Component Value Date    HGB 14.2 11/17/2020    HCT 42.1 11/17/2020        PT/INR:    Lab Results   Component Value Date    PROTIME 11.9 11/09/2020    INR 1.03 11/09/2020              Current Inpatient Medications             Current Facility-Administered Medications: nicotine (NICODERM CQ) 21 MG/24HR 1 patch, 1 patch, Transdermal, Daily  diazePAM (VALIUM) tablet 2 mg, 2 mg, Oral, Q8H PRN  hydroxychloroquine (PLAQUENIL) tablet 200 mg, 200 mg, Oral, BID  levothyroxine (SYNTHROID) tablet 300 mcg, 300 mcg, Oral, QAM AC  oxybutynin (DITROPAN-XL) extended release tablet 10 mg, 10 mg, Oral, Daily  simvastatin (ZOCOR) tablet 20 mg, 20 mg, Oral, Nightly  triamterene-hydroCHLOROthiazide (MAXZIDE) 75-50 MG per tablet 1 tablet, 1 tablet, Oral, Daily  0.45 % sodium chloride infusion, , Intravenous, Continuous  sodium chloride flush 0.9 % injection 10 mL, 10 mL, Intravenous, 2 times per day  sodium chloride flush 0.9 % injection 10 mL, 10 mL, Intravenous, PRN  acetaminophen (TYLENOL) tablet 650 mg, 650 mg, Oral, Q4H PRN  oxyCODONE (ROXICODONE) immediate release tablet 5 mg, 5 mg, Oral, Q4H PRN **OR** oxyCODONE HCl (OXY-IR) immediate release tablet 10 mg, 10 mg, Oral, Q4H PRN  sennosides-docusate sodium (SENOKOT-S) 8.6-50 MG tablet 1 tablet, 1 tablet, Oral, BID  magnesium hydroxide (MILK OF MAGNESIA) 400 MG/5ML suspension 30 mL, 30 mL, Oral, Daily PRN  ondansetron (ZOFRAN) injection 4 mg, 4 mg, Intravenous, Q6H PRN  enoxaparin (LOVENOX) injection 30 mg, 30 mg, Subcutaneous, BID  insulin lispro (HUMALOG) injection vial 0-6 Units, 0-6 Units, Subcutaneous, TID WC  insulin lispro (HUMALOG) injection vial 0-3 Units, 0-3 Units, Subcutaneous, Nightly  glucose (GLUTOSE) 40 % oral gel 15 g, 15 g, Oral, PRN  dextrose 50 % IV solution, 12.5 g, Intravenous, PRN  glucagon (rDNA) injection 1 mg, 1 mg, Intramuscular, PRN  dextrose 5 % solution, 100 mL/hr, Intravenous, PRN  lubrifresh P.M. (artificial tears) ophthalmic ointment, , Both Eyes, PRN  cyclobenzaprine (FLEXERIL) tablet 5 mg, 5 mg, Oral, TID PRN    ASSESSMENT AND PLAN      Post righ tTHA, stable exam  DVT prophylaxis ordered, Lovenox as inpt then switch to Eliquis bid for 30 total days after discharge from hospital for DVT prophylaxis  Reviewed with patient importance of SHELBIE hose on daily/ off at  Night for 2 weeks as well and continue anticoagulant medication at home as ordered to reduce risk of postoperative DVT or PE clots. Pt verbalized understanding.   PT OT for ADL's and ambulation as tolerated, anterior hip precautions  SS for DC planning, home with home care today  IV or PO pain med as ordered    Kylie Mansfield

## 2020-11-18 NOTE — PLAN OF CARE
Problem: Cardiac:  Goal: Ability to maintain vital signs within normal range will improve  Description: Ability to maintain vital signs within normal range will improve  11/18/2020 0710 by Dasha Peralta RN  Outcome: Ongoing  Note: Ability to maintain vital signs within normal range will improve. Electronically signed by Dasha Peralta RN on 11/18/2020 at 7:11 AM    11/18/2020 0209 by Jenna Kaminski RN  Outcome: Ongoing  11/17/2020 1755 by Kristyn Mercer RN  Outcome: Met This Shift  Note: Vital signs stable, respiratory rate normal, heart rate is WNLs and regular. +2 pulses and less than 3 second cap refill noted in all extremities, body color appropriate for ethnicity and temperature warm, Will continue to monitor and reassess. Problem: Discharge Planning:  Goal: Knowledge of discharge instructions  Description: Knowledge of discharge instructions  11/18/2020 0710 by Dasha Peralta RN  Outcome: Ongoing  Note: Knowledge of discharge instructions. Electronically signed by Dasha Prealta RN on 11/18/2020 at 7:11 AM    11/18/2020 0209 by Jenna Kaminski RN  Outcome: Ongoing  11/17/2020 1755 by Kristyn Mercer RN  Outcome: Ongoing  Note: She was unable to discharge today and after visit summary has not been done, she did do the JET class and is able to understand teaching. Problem: Infection - Surgical Site:  Goal: Signs of wound healing will improve  Description: Signs of wound healing will improve  11/18/2020 0710 by Dasha Peralta RN  Outcome: Ongoing  Note: Signs of wound healing will improve.   Electronically signed by Dasha Peralta RN on 11/18/2020 at 7:11 AM    11/18/2020 0209 by Jenna Kaminski RN  Outcome: Ongoing  11/17/2020 1755 by Kristyn Mercer RN  Outcome: Met This Shift  Note: Dressing to the right hip is clean, dry, intact, the incision is well approximated      Problem: Mobility - Impaired:  Goal: Achieve maximum mobility level  Description: Achieve maximum mobility level  11/18/2020 0710 by Delmer Le RN  Outcome: Ongoing  Note: Achieve maximum mobility level. Electronically signed by Delmer Le RN on 11/18/2020 at 7:12 AM    11/18/2020 0209 by Felicity Song RN  Outcome: Ongoing  11/17/2020 1755 by Charly Hernandez RN  Outcome: Met This Shift  Note: Patient has gotten up several times to go to the bathroom, and she has worked with physical and occupational therapies this shift. Problem: Pain - Acute:  Goal: Pain level will decrease  Description: Pain level will decrease  11/18/2020 0710 by Delmer Le RN  Outcome: Ongoing  Note: Pain level will decrease. Electronically signed by Delmer Le RN on 11/18/2020 at 7:12 AM    11/18/2020 0209 by Felicity Song RN  Outcome: Ongoing  11/17/2020 1755 by Charly Hernandez RN  Outcome: Ongoing  Note: Patient has been give pain medication as appropriate however she has pain issues this shift. Problem: Pain:  Goal: Pain level will decrease  Description: Pain level will decrease  11/18/2020 0710 by Delmer Le RN  Outcome: Ongoing  Note: Pain level will decrease. Electronically signed by Delmer Le RN on 11/18/2020 at 7:12 AM    11/18/2020 0209 by Felicity oSng RN  Outcome: Ongoing  11/17/2020 1755 by Charly Hernandez RN  Outcome: Ongoing  Note: Patient has been give pain medication as appropriate however she has pain issues this shift. Goal: Control of acute pain  Description: Control of acute pain  11/18/2020 0710 by Delmer Le RN  Outcome: Ongoing  Note: Control of acute pain.   Electronically signed by Delmer Le RN on 11/18/2020 at 7:12 AM    11/18/2020 0209 by Felicity Song RN  Outcome: Ongoing  11/17/2020 1755 by Charly Hernandez RN  Outcome: Ongoing  Note: Patient has been assessed for pain on a regular bases, patient has been given pain medication as appropriate, patient has been reassessed for pain after medication has been administered  Goal: Control of chronic pain  Description: Control of chronic pain  11/18/2020 0710 by Edward Soria RN  Outcome: Ongoing  Note: Control of chronic pain. Electronically signed by Edward Soria RN on 11/18/2020 at 7:12 AM    11/18/2020 0209 by Rivka Neumann RN  Outcome: Ongoing  11/17/2020 1755 by Carol Chun RN  Outcome: Ongoing  Note: Patient is alert and oriented X4. Patient is able to identify the sensation of pain and communicate the need for pain medication appropriately. Problem: Falls - Risk of:  Goal: Will remain free from falls  Description: Will remain free from falls  11/18/2020 0710 by Edward Soria RN  Outcome: Ongoing  Note: Will remain free from falls. Electronically signed by Edward Soria RN on 11/18/2020 at 7:12 AM    11/18/2020 0209 by Rivka Neumann RN  Outcome: Ongoing  11/17/2020 1755 by Carol Chun RN  Outcome: Met This Shift  Note: No falls noted this shift. Patient ambulates with x2 staff assist and contact guard with a walker. Bed kept in low position. Safe environment maintained. Bedside table & call light in reach. Uses call light appropriately when needing assistance. Goal: Absence of physical injury  Description: Absence of physical injury  11/18/2020 0710 by Edward Soria RN  Outcome: Ongoing  Note: Absence of physical injury. Electronically signed by Edward Sroia RN on 11/18/2020 at 7:13 AM    11/18/2020 0209 by Rivka Neumann RN  Outcome: Ongoing  11/17/2020 1755 by Carol Chun RN  Outcome: Met This Shift  Note: No physical injury noted this shift. Patient has been assessed for fall risk, fall precautions are in place, environment has been cleared of obstacles and reassessed during rounding, bed is in lowest position with the wheels locked, call light is within reach.  ID band has been verified, appropriate transfer methods have been utilized and patient has been educated on safety, bed and chair alarms utilized      Problem: Skin Integrity:  Goal: Will show no infection signs and symptoms  Description: Will show no infection signs and symptoms  11/18/2020 0710 by Eve La RN  Outcome: Ongoing  Note: Will show no signs/symptoms infection. Electronically signed by Eve La RN on 11/18/2020 at 7:13 AM    11/18/2020 0209 by Alice Randhawa RN  Outcome: Ongoing  11/17/2020 1755 by Alisia Bowers RN  Outcome: Met This Shift  Goal: Absence of new skin breakdown  Description: Absence of new skin breakdown  11/18/2020 0710 by Eve La RN  Outcome: Ongoing  Note: Absence of new skin breakdown.   Electronically signed by Eve La RN on 11/18/2020 at 7:13 AM    11/18/2020 0209 by Alice Randhawa RN  Outcome: Ongoing  11/17/2020 1755 by Alisia Bowers RN  Outcome: Met This Shift

## 2020-11-19 ENCOUNTER — CARE COORDINATION (OUTPATIENT)
Dept: CASE MANAGEMENT | Age: 55
End: 2020-11-19

## 2020-11-19 NOTE — CARE COORDINATION
[facility] Initial Outreach    Call within 2 business days of discharge: Yes    Patient: Rosendo Brooke Patient : 1965   MRN: <[id number]>  Discharge Date: 20   RARS: Readmission Risk Score: 7      Last Discharge [address] [address]       Complaint Diagnosis Description Type Department Provider    20  Arthritis of right hip . .. Admission (Discharged) Mian Willams MD           Spoke with: Rosendo Brooke     Non-face-to-face services provided:  Education of patient/family/caregiver/guardian to support self-management-s/s to report; when to call [facility].    Challenges to be reviewed by the provider   Additional needs identified to be addressed with provider No    COVID-19 testing not done during this admission. COVID-19 last tested on 11/10/2020 and was Not Detected. Patient informed of results, if available? Yes       Method of communication with provider : none    Advance Care Planning:   Does patient have an Advance Directive:  decision maker updated. Was this a readmission? No  Patient stated reason for admission: s/p L MARY  Patients top risk factors for readmission: functional physical ability and medical condition    Care Transition Nurse (CTN) contacted the patient by telephone to perform post hospital discharge assessment. Verified name and  with patient as identifiers. Provided introduction to self, and explanation of the CTN role. CTN reviewed discharge instructions, medical action plan and red flags with patient who verbalized understanding. Patient given an opportunity to ask questions and does not have any further questions or concerns at this time. Were discharge instructions available to patient? Yes. Reviewed appropriate site of care based on symptoms and resources available to patient including: PCP, Specialist, Home health and [employer]. The patient agrees to contact the PCP office for questions related to their healthcare.      Medication reconciliation was performed with patient, who verbalizes understanding of administration of home medications. Covid Risk Education    Patient has following COVID-19 risk factors: obesity. Education provided regarding infection prevention, and signs and symptoms of COVID-19 and when to seek medical attention with patient who verbalized understanding. Discussed exposure protocols and quarantine From CDC: Are you at higher risk for severe illness?   and given an opportunity for questions and concerns. The patient agrees to contact the COVID-19 hotline [phone] or PCP office for questions related to COVID-19. Symptoms reviewed with patient who verbalized the following symptoms: loss of taste or smell, no new symptoms and no worsening symptoms. Due to no new or worsening symptoms encounter was not routed to provider for escalation. Patient/family/caregiver given information for [facility] and agrees to enroll no  Patient's preferred e-mail: declines  Patient's preferred phone number: declines    Discussed follow-up appointments. If no appointment was previously scheduled, appointment scheduling offered: scheduled as below. Is follow up appointment scheduled within 7 days of discharge? No  Non-[facility] follow up appointment(s): NA    Plan for follow-up call in 5-7 days based on severity of symptoms and risk factors. Plan for next call: symptom management-     PT just left. Said that she has a lot of edema and is unable to use her LE. States the PT is going to call Dr Ela Steward with update to plan. Will be back twice next week. OT to visit later today. Family present, icing at this time. They are able to asisst her with ADLs. She is taking Eliquis as instructed. Reports her pain controlled well. No BM yet but states she is not eating much yet. Recommended stool softener to prevent constipation. Reports no appetite, but drinking fluids. States she has no taste.  Has chills \"all the time\" - no fever during PT visit just now. Denies cough. Denies known exposure to COVID-19. Reviewed s/s COVID-19, local [facility]  COVID-19 testing site, [facility] hotline number. She denies needs at this time. CTN provided contact information for future needs.     Follow Up  Future Appointments   Date Time Provider Rima Lopez   11/30/2020 10:15 AM Mara Rizzo MD  [facility]       Rima Bush, RN  Care Transition Nurse  [phone] mobile

## 2020-11-20 ENCOUNTER — TELEPHONE (OUTPATIENT)
Dept: ORTHOPEDIC SURGERY | Age: 55
End: 2020-11-20

## 2020-11-20 NOTE — TELEPHONE ENCOUNTER
Please call asap for swelling in right leg from hip to knee, on distal side very warm to touch.     Heart rate 106

## 2020-11-20 NOTE — TELEPHONE ENCOUNTER
I called and gave verbal order for PT and called the patient and she is having significant pain.   I am having PT go out over the weekend and the patient is to be seen Monday if not better

## 2020-11-23 ENCOUNTER — TELEPHONE (OUTPATIENT)
Dept: ORTHOPEDIC SURGERY | Age: 55
End: 2020-11-23

## 2020-11-23 NOTE — TELEPHONE ENCOUNTER
Other Patient would like a call back. Patient thought her post op appointment was on friday with the doctor. I let the patient know when her post op appointment was and offered her to see dr Garfield Pyle and patient would like a call from the office.  ph [phone]

## 2020-11-25 ENCOUNTER — CARE COORDINATION (OUTPATIENT)
Dept: CASE MANAGEMENT | Age: 55
End: 2020-11-25

## 2020-11-25 NOTE — CARE COORDINATION
Date/Time:  11/25/2020 4:04 PM  Attempted to reach patient by telephone. Left HIPPA compliant message stating this was final call and to call MDs for any medical needs.

## 2020-11-27 ENCOUNTER — TELEPHONE (OUTPATIENT)
Dept: ORTHOPEDIC SURGERY | Age: 55
End: 2020-11-27

## 2020-11-27 NOTE — TELEPHONE ENCOUNTER
Medical Facility Question     Facility Name: [facility]  Contact Name: Chadd Lock Number: [id number]  Request or Information: ADVISED PATIENT IS BEING DISCHARGED EARLY BECAUSE GOAL WAS EXCEEDED

## 2020-12-03 ENCOUNTER — OFFICE VISIT (OUTPATIENT)
Dept: ORTHOPEDIC SURGERY | Age: 55
End: 2020-12-03

## 2020-12-03 VITALS — TEMPERATURE: 97.8 F

## 2020-12-03 PROCEDURE — 99024 POSTOP FOLLOW-UP VISIT: CPT | Performed by: ORTHOPAEDIC SURGERY

## 2020-12-03 NOTE — PROGRESS NOTES
This dictation was done with [employer] dictation and may contain mechanical errors related to translation. Temperature 97.8 °F (36.6 °C), temperature source Temporal, last menstrual period 01/27/2015, not currently breastfeeding.

## 2020-12-06 NOTE — PROGRESS NOTES
Patient is a 75-year-old female status post right total hip arthroplasty performed through a direct lateral approach on November 16, 2020. She is here for her first postoperative visit. She is doing reasonably well rates her pain at 5 out of 10. Physical examination 75-year-old female oriented x3 temperature is 97.8. Examination of her right hip shows a healing lateral wound. No obvious drainage or signs of deep sepsis. Decreased range of motion of the hip joint but no obvious signs of instability. Distal neurovascular examinations intact of the right foot and ankle. X-rays obtained AP pelvis AP lateral of the right hip show status post uncemented right total hip arthroplasty. Stable overall orientation and alignment with no obvious signs of loosening instability subsidence or failure. Contralateral left total hip arthroplasty uncemented in variety is noted and appears that well ingrown acetabular and femoral components are seen. No other obvious fractures tumors or dislocations are noted on these x-rays. Impression 75-year-old female with class III morbid obesity BMI of 43.1 stable status post direct lateral right uncemented total hip arthroplasty. Plan continue physical therapy follow-up in 4 to 6 weeks or as needed.

## 2020-12-09 ENCOUNTER — HOSPITAL ENCOUNTER (OUTPATIENT)
Dept: PHYSICAL THERAPY | Age: 55
Setting detail: THERAPIES SERIES
Discharge: HOME | End: 2020-12-09
Payer: MEDICARE

## 2020-12-09 PROCEDURE — 97140 MANUAL THERAPY 1/> REGIONS: CPT

## 2020-12-09 PROCEDURE — 97530 THERAPEUTIC ACTIVITIES: CPT

## 2020-12-09 PROCEDURE — 97110 THERAPEUTIC EXERCISES: CPT

## 2020-12-09 PROCEDURE — 97161 PT EVAL LOW COMPLEX 20 MIN: CPT

## 2020-12-09 NOTE — FLOWSHEET NOTE
[facility] - Outpatient Rehabilitation & Therapy  [address] [facility]. [address] [place] [address]  Phone: [phone]   Fax:     [phone]      Physical Therapy Treatment Note/ Progress Report:     Date:  2020    Patient Name:  Sharlene Billy    :  1965  MRN: [id number]    Pertinent Medical History:Additional Pertinent Hx: anxiety, chronic back pain, depression, OA    Medical/Treatment Diagnosis Information:  ·  History of right jamey  · Treatment Diagnosis: decreased abilty to ambulate and function    Insurance/Certification information:  PT Insurance Information: Medicare  Physician Information:  Referring Practitioner: Dr. Vini Rucker of care signed (Y/N):     Date of Patient follow up with Physician:      Progress Report: []  Yes  [x]  No     Date Range for reporting period:  Beginnin2020  Ending:      Progress report due (10 Rx/or 30 days whichever is less): 68     Recertification due (POC duration/ or 90 days whichever is less):      Visit # POC/Insurance Allowable Auth Needed   1 medicare []Yes   []No     Latex Allergy:  [x]NO      []YES  Preferred Language for Healthcare:   [x]English       []Other:    Functional Scale:      Date assessed: at eval  Test: LEFS-18  Score:    Pain level:  10/10     History of Injury: Pt is a 61 y/o female with a hx of right hip pain for a few years with a resultant right jamey on 20  . She was seen at home for a few weeks. She now c/o constant aching pain in her hip and le which is sharper on occasion. She also started having  sharper pain in her lb today and down her right le to her foot which is a 10 /10 today. She has tried walking without the cane or walker recently. She is mainly walking with a cane. She wakes due to pain. She hopes to decrease pain and resume normal activities. She also has a left tka from 4 years ago.  Her goal is to walk normal    SUBJECTIVE:  Pt states, \" I want to walk normal \"    OBJECTIVE:     ROM LEFT RIGHT   HIP Flex wfl 100   HIP Abd   20   HIP Ext   0   HIP IR   0   HIP ER   20   Knee ext   120   Knee Flex   0   Ankle PF   40   Ankle DF   8   Ankle In       Ankle Ev       Strength  LEFT RIGHT   HIP Flexors   3 unable to do slr   HIP Abductors   4--   HIP Ext   4-   Hip ER   4-   Knee EXT (quad)   4-   Knee Flex (HS)   4-   Ankle DF   5   Ankle PF   5   Ankle Inv   5   Ankle EV   5                                Abdominals-2/5    RESTRICTIONS/PRECAUTIONS: follow precautions for jamey    Exercises/Interventions:     Therapeutic Ex (79170)   Min: Reps/Resistance Notes/CUES                                      Manual Intervention (07577)  Min:     Knee mobs/PROM     Tib/Fem Mobs     Patella Mobs     Ankle mobs     Mfr to gluts, piriformis, itb, add, prom for all hip motions per protocol 15 min          NMR re-education (34764)  Min:  CUES NEEDED             Therapeutic Activity (60469)  Min:     Went over gait with cane and walker, discussed ns posture and the need for it to walk normal, discussed ns to help protect her back and prevent back pain 15 min          Modalities  Min:     IFC with      CP after exercises     MH after exercises            Other Therapeutic Activities: Pt was educated on PT POC, Diagnosis, Prognosis, pathomechanics as well as frequency and duration of scheduling future physical therapy appointments. Time was also taken on this day to answer all patient questions and participation in PT. Reviewed appointment policy in detail with patient and patient verbalized understanding. Home Exercise Program: Patient was instructed in the following for HEP:     . Patient verbalized/demonstrated understanding and was issued written handout.   HEP- Seated hamstring Stretch 30 sec x 5  Seated Calf Stretch           30 sec x 5  Seated Flexion Stretch      30 sec x 5  Seated Heel Slide  x30  Long Arc Quad      x 30  Quad Set              x 30  Prone Flexion modulating pain, promoting relaxation,  increasing ROM, reducing/eliminating soft tissue swelling/inflammation/restriction, improving soft tissue extensibility and allowing for proper ROM for normal function with self care, mobility, lifting and ambulation. If BWC Please Indicate Time In/Out  CPT Code Time in Time out                         Charges:  Timed Code Treatment Minutes: 45   Total Treatment Minutes: 65      [x] EVAL (LOW) 52085 (typically 20 minutes face-to-face)  [] EVAL (MOD) 38398 (typically 30 minutes face-to-face)  [] EVAL (HIGH) 97490 (typically 45 minutes face-to-face)  [] RE-EVAL     [x] OD(08488) x 1    [] Dry needle 1 or 2 Muscles (39266)  [] NMR (78320) x     [] Dry needle 3+ Muscles (39119)  [x] Manual (56050) x 1    [] Ultrasound (80870) x  [x] TA (16463) x 1    [] Mech Traction (51576)  [] ES(attended) (43580)     [] ES (un) (36534):   [] Vasopump (24839) [] Ionto (46483)   [] Other:    GOALS:GOALS:  Patient stated goal: \" I want to be able to walk normal \"  []? Progressing: []? Met: []? Not Met: []? Adjusted     Therapist goals for Patient:   Short Term Goals: To be achieved in: 4weeks  1. Independent in HEP and progression per patient tolerance, in order to prevent re-injury. []? Progressing: []? Met: []? Not Met: []? Adjusted  2. Patient will have a decrease in pain to facilitate improvement in movement, function, and ADLs as indicated by Functional Deficits. []? Progressing: []? Met: []? Not Met: []? Adjusted     Long Term Goals: To be achieved in: 8 weeks     []? Progressing: []? Met: []? Not Met: []? Adjusted  2. Patient will demonstrate increased AROM to normal for jamey to allow for proper joint functioning as indicated by patients Functional Deficits. []? Progressing: []? Met: []? Not Met: []? Adjusted  3.  Patient will demonstrate an increase in Strength to good proximal hip strength and control, within 5lb HHD in LE to allow for proper functional mobility as indicated by patients Functional Deficits. []? Progressing: []? Met: []? Not Met: []? Adjusted  4. Patient will return to 75%functional activities without increased symptoms or restriction. []? Progressing: []? Met: []? Not Met: []? Adjusted  5. (patient specific functional goal)    []? Progressing: []? Met: []? Not Met: []? Adjusted      ASSESSMENT:  See eval    Treatment/Activity Tolerance:  [x] Patient tolerated treatment well [] Patient limited by fatique  [] Patient limited by pain  [] Patient limited by other medical complications  [] Other:     Overall Progression Towards Functional goals/ Treatment Progress Update:  [] Patient is progressing as expected towards functional goals listed. [] Progression is slowed due to complexities/Impairments listed. [] Progression has been slowed due to co-morbidities. [x] Plan just implemented, too soon to assess goals progression <30days   [] Goals require adjustment due to lack of progress  [] Patient is not progressing as expected and requires additional follow up with physician  [] Other    Prognosis for POC: [x] Good [] Fair  [] Poor                                      Medicare    Patient requires continued skilled intervention: [x] Yes  [] No        PLAN: LE arom, prom  strength, proprioception, gait, balance, functional activities. Mfr, joint mobs, mods as needed, hep. Progress as tolerated, follow ant jamey precautions, work on ns posture core stab and gait    [] Continue per plan of care [] Alter current plan (see comments)  [x] Plan of care initiated [] Hold pending MD visit [] Discharge    Electronically signed by: Chilango Blanco PT    Note: If patient does not return for scheduled/recommended follow up visits, this note will serve as a discharge from care along with the most recent update on progress.

## 2020-12-17 ENCOUNTER — HOSPITAL ENCOUNTER (OUTPATIENT)
Dept: PHYSICAL THERAPY | Age: 55
Setting detail: THERAPIES SERIES
Discharge: HOME | End: 2020-12-17
Payer: MEDICARE

## 2020-12-17 NOTE — FLOWSHEET NOTE
Physical Therapy  Cancellation/No-show Note  Patient Name:  Nico Powell  :  1965   Date:  2020  Cancelled visits to date: 1  No-shows to date: 0    For today's appointment patient:  []  Cancelled  []  Rescheduled appointment  []  No-show     Reason given by patient:  [x]  Patient ill  []  Conflicting appointment  []  No transportation    []  Conflict with work  []  No reason given  []  Other:     Comments:      Electronically signed by:  Nikky Hough PT

## 2020-12-30 ENCOUNTER — HOSPITAL ENCOUNTER (OUTPATIENT)
Dept: PHYSICAL THERAPY | Age: 55
Setting detail: THERAPIES SERIES
Discharge: HOME | End: 2020-12-30
Payer: MEDICARE

## 2020-12-30 ENCOUNTER — TELEPHONE (OUTPATIENT)
Dept: ORTHOPEDIC SURGERY | Age: 55
End: 2020-12-30

## 2020-12-30 NOTE — FLOWSHEET NOTE
Physical Therapy  Cancellation/No-show Note  Patient Name:  Celine Singleton  :  1965   Date:  2020  Cancelled visits to date: 1  No-shows to date:1            DC PT due to attendance policy    For today's appointment patient:  []  Cancelled  []  Rescheduled appointment  [x]  No-show     Reason given by patient:  []  Patient ill  []  Conflicting appointment  []  No transportation    []  Conflict with work  []  No reason given  []  Other:     Comments:      Electronically signed by:  Kumar English, PT

## 2020-12-30 NOTE — FLOWSHEET NOTE
[facility] - Outpatient Rehabilitation & Therapy  [address] [facility] [place]  Phone: [phone]   Fax:     [phone]      Physical Therapy Treatment Note/ Progress Report:     Date:  2020    Patient Name:  Carol Pardo    :  1965  MRN: [id number]    Pertinent Medical History:     Medical/Treatment Diagnosis Information:  ·  History of right jamey  · Treatment Diagnosis: decreased abilty to ambulate and function    Insurance/Certification information:  PT Insurance Information: Medicare  Physician Information:  Referring Practitioner: Dr. Markie Garza of care signed (Y/N):     Date of Patient follow up with Physician:      Progress Report: []  Yes  [x]  No     Date Range for reporting period:  Beginnin2020  Ending:      Progress report due (10 Rx/or 30 days whichever is less): 3/2/16     Recertification due (POC duration/ or 90 days whichever is less):      Visit # POC/Insurance Allowable Auth Needed   2 medicare []Yes   []No     Latex Allergy:  [x]NO      []YES  Preferred Language for Healthcare:   [x]English       []Other:    Functional Scale:      Date assessed: at eval  Test: LEFS-18  Score:    Pain level:  10/10     History of Injury: Pt is a 59 y/o female with a hx of right hip pain for a few years with a resultant right jamey on 20  . She was seen at home for a few weeks. She now c/o constant aching pain in her hip and le which is sharper on occasion. She also started having  sharper pain in her lb today and down her right le to her foot which is a 10 /10 today. She has tried walking without the cane or walker recently. She is mainly walking with a cane. She wakes due to pain. She hopes to decrease pain and resume normal activities. She also has a left tka from 4 years ago.  Her goal is to walk normal    SUBJECTIVE:  Pt states, \" I want to walk normal \"  20  Pt states, \"     OBJECTIVE:     ROM LEFT RIGHT   HIP Flex wfl 100   HIP Abd   20   HIP Ext   0   HIP IR   0   HIP ER   20   Knee ext   120   Knee Flex   0   Ankle PF   40   Ankle DF   8   Ankle In       Ankle Ev       Strength  LEFT RIGHT   HIP Flexors   3 unable to do slr   HIP Abductors   4--   HIP Ext   4-   Hip ER   4-   Knee EXT (quad)   4-   Knee Flex (HS)   4-   Ankle DF   5   Ankle PF   5   Ankle Inv   5   Ankle EV   5                                Abdominals-2/5    RESTRICTIONS/PRECAUTIONS: follow precautions for jamey    Exercises/Interventions:     Therapeutic Ex (48664)   Min: Reps/Resistance Notes/CUES                                      Manual Intervention (94409)  Min:     Knee mobs/PROM     Tib/Fem Mobs     Patella Mobs     Ankle mobs     Mfr to gluts, piriformis, itb, add, prom for all hip motions per protocol 15 min          NMR re-education (34431)  Min:  CUES NEEDED             Therapeutic Activity (98137)  Min:     Went over gait with cane and walker, discussed ns posture and the need for it to walk normal, discussed ns to help protect her back and prevent back pain 15 min          Modalities  Min:     IFC with      CP after exercises     MH after exercises            Other Therapeutic Activities: Pt was educated on PT POC, Diagnosis, Prognosis, pathomechanics as well as frequency and duration of scheduling future physical therapy appointments. Time was also taken on this day to answer all patient questions and participation in PT. Reviewed appointment policy in detail with patient and patient verbalized understanding. Home Exercise Program: Patient was instructed in the following for HEP:     . Patient verbalized/demonstrated understanding and was issued written handout.   HEP- Seated hamstring Stretch 30 sec x 5  Seated Calf Stretch           30 sec x 5  Seated Flexion Stretch      30 sec x 5  Seated Heel Slide  x30  Long Arc Quad      x 30  Quad Set              x 30  Prone Flexion       x 20  Straight Leg Raise   x 20  Glut Set                  x 20  Ankle Pump          x 20  Semi Squat         x 20  Ppt x 20  str- x 2 min      Therapeutic Exercise and NMR EXR  [] (91981) Provided verbal/tactile cueing for activities related to strengthening, flexibility, endurance, ROM for improvements in LE, proximal hip, and core control with self care, mobility, lifting, ambulation.  [] (12890) Provided verbal/tactile cueing for activities related to improving balance, coordination, kinesthetic sense, posture, motor skill, proprioception  to assist with LE, proximal hip, and core control in self care, mobility, lifting, ambulation and eccentric single leg control.      NMR and Therapeutic Activities:    [] (11805 or 62867) Provided verbal/tactile cueing for activities related to improving balance, coordination, kinesthetic sense, posture, motor skill, proprioception and motor activation to allow for proper function of core, proximal hip and LE with self care and ADLs and functional mobility.   [] (06161) Gait Re-education- Provided training and instruction to the patient for proper LE, core and proximal hip recruitment and positioning and eccentric body weight control with ambulation re-education including up and down stairs     Home Exercise Program:    [x] (79508) Reviewed/Progressed HEP activities related to strengthening, flexibility, endurance, ROM of core, proximal hip and LE for functional self-care, mobility, lifting and ambulation/stair navigation   [] (43283)Reviewed/Progressed HEP activities related to improving balance, coordination, kinesthetic sense, posture, motor skill, proprioception of core, proximal hip and LE for self care, mobility, lifting, and ambulation/stair navigation      Manual Treatments:  PROM / STM / Oscillations-Mobs:  G-I, II, III, IV (PA's, Inf., Post.)  [] (39218) Provided manual therapy to mobilize LE, proximal hip and/or LS spine soft tissue/joints for the purpose of modulating pain, promoting relaxation,  increasing ROM, reducing/eliminating soft tissue swelling/inflammation/restriction, improving soft tissue extensibility and allowing for proper ROM for normal function with self care, mobility, lifting and ambulation. If BWC Please Indicate Time In/Out  CPT Code Time in Time out                         Charges:  Timed Code Treatment Minutes: 45   Total Treatment Minutes: 65      [x] EVAL (LOW) 09970 (typically 20 minutes face-to-face)  [] EVAL (MOD) 36074 (typically 30 minutes face-to-face)  [] EVAL (HIGH) 76678 (typically 45 minutes face-to-face)  [] RE-EVAL     [x] GG(41505) x 1    [] Dry needle 1 or 2 Muscles (63277)  [] NMR (30349) x     [] Dry needle 3+ Muscles (03187)  [x] Manual (31262) x 1    [] Ultrasound (57071) x  [x] TA (76836) x 1    [] Mech Traction (86533)  [] ES(attended) (19692)     [] ES (un) (11094):   [] Vasopump (13907) [] Ionto (96896)   [] Other:    GOALS:GOALS:  Patient stated goal: \" I want to be able to walk normal \"  []? Progressing: []? Met: []? Not Met: []? Adjusted     Therapist goals for Patient:   Short Term Goals: To be achieved in: 4weeks  1. Independent in HEP and progression per patient tolerance, in order to prevent re-injury. []? Progressing: []? Met: []? Not Met: []? Adjusted  2. Patient will have a decrease in pain to facilitate improvement in movement, function, and ADLs as indicated by Functional Deficits. []? Progressing: []? Met: []? Not Met: []? Adjusted     Long Term Goals: To be achieved in: 8 weeks     []? Progressing: []? Met: []? Not Met: []? Adjusted  2. Patient will demonstrate increased AROM to normal for jamey to allow for proper joint functioning as indicated by patients Functional Deficits. []? Progressing: []? Met: []? Not Met: []? Adjusted  3. Patient will demonstrate an increase in Strength to good proximal hip strength and control, within 5lb HHD in LE to allow for proper functional mobility as indicated by patients Functional Deficits. []? Progressing: []? Met: []? Not Met: []? Adjusted  4. Patient will return to 75%functional activities without increased symptoms or restriction. []? Progressing: []? Met: []? Not Met: []? Adjusted  5. (patient specific functional goal)    []? Progressing: []? Met: []? Not Met: []? Adjusted      ASSESSMENT:  See eval    Treatment/Activity Tolerance:  [x] Patient tolerated treatment well [] Patient limited by fatique  [] Patient limited by pain  [] Patient limited by other medical complications  [] Other:     Overall Progression Towards Functional goals/ Treatment Progress Update:  [] Patient is progressing as expected towards functional goals listed. [] Progression is slowed due to complexities/Impairments listed. [] Progression has been slowed due to co-morbidities. [x] Plan just implemented, too soon to assess goals progression <30days   [] Goals require adjustment due to lack of progress  [] Patient is not progressing as expected and requires additional follow up with physician  [] Other    Prognosis for POC: [x] Good [] Fair  [] Poor                                      Medicare    Patient requires continued skilled intervention: [x] Yes  [] No        PLAN: LE arom, prom  strength, proprioception, gait, balance, functional activities. Mfr, joint mobs, mods as needed, hep. Progress as tolerated, follow ant jamey precautions, work on ns posture core stab and gait    [] Continue per plan of care [] Alter current plan (see comments)  [x] Plan of care initiated [] Hold pending MD visit [] Discharge    Electronically signed by: Marcial Sims PT    Note: If patient does not return for scheduled/recommended follow up visits, this note will serve as a discharge from care along with the most recent update on progress.

## 2020-12-30 NOTE — TELEPHONE ENCOUNTER
Pt had MARY on 21-36 and now has clicking in that hip. Says if she goes to grocery or walking on concrete it seems to happen more. There is no pain associated with it, but has concerns.     Please call at [phone]      Told not sure if it will be thurs or Monday till she gets a return call

## 2021-01-07 ENCOUNTER — OFFICE VISIT (OUTPATIENT)
Dept: ORTHOPEDIC SURGERY | Age: 56
End: 2021-01-07

## 2021-01-07 VITALS — TEMPERATURE: 97.5 F

## 2021-01-07 DIAGNOSIS — Z96.641 HISTORY OF TOTAL HIP REPLACEMENT, RIGHT: Primary | ICD-10-CM

## 2021-01-07 PROCEDURE — 99024 POSTOP FOLLOW-UP VISIT: CPT | Performed by: PHYSICIAN ASSISTANT

## 2021-01-07 NOTE — PROGRESS NOTES
Subjective:      Patient ID: Nano Ponce is a 60 y.o.  female. Chief Complaint   Patient presents with    Post-Op Check     Right MARY 11.16.2020        HPI:  Here for post op visit. S/P right hip arthroplasty. The date of procedure- 11/16/2020. Surgeon: Dr Michael Mathur  Pain: 0/10. She states she is only been through 1 physical therapy session since surgery. She has been doing a home exercise program on her own. She is still concerned about limp and weakness in her right hip. Review of Systems:   Negative for fever or chills.      Past Medical History:   Diagnosis Date    Anemia     Anesthesia complication     severe headache after woke up after wisdom teeth    Anxiety and depression     Chronic back pain     Condyloma acuminatum     Headache(784.0)     Hyperlipidemia     Hypertension     Hypothyroidism     IBS (irritable bowel syndrome)     Metrorrhagia     Neuropathy     Osteoarthritis     Urgency of urination     Wears glasses     driving at night/reading       Family History   Problem Relation Age of Onset    Diabetes Other     High Blood Pressure Other     Stroke Other     Cancer Other         Bone    Alcohol Abuse Other     Heart Disease Mother         cardiac arrhythmia    High Blood Pressure Mother     Thyroid Disease Mother     Arthritis Father     Heart Disease Father         cabg    Diabetes Father     Stroke Father     Cancer Sister         breast cancer    Cancer Brother         lung       Past Surgical History:   Procedure Laterality Date    LEEP      TOTAL HIP ARTHROPLASTY Left 08/16/2016    TOTAL HIP ARTHROPLASTY Right 11/16/2020    RIGHT LATERAL TOTAL HIP REPLACEMENT performed by Wojciech Agrawal MD at [address] History     Occupational History    Occupation:    Tobacco Use    Smoking status: Current Every Day Smoker     Packs/day: 1.00     Years: 30.00     Pack years: 30.00     Types: Cigarettes  Smokeless tobacco: Never Used    Tobacco comment: encouraged to stop smoking    Substance and Sexual Activity    Alcohol use: Yes     Alcohol/week: 0.0 standard drinks     Comment: rare    Drug use: Never    Sexual activity: Not Currently       Current Outpatient Medications   Medication Sig Dispense Refill    meloxicam (MOBIC) 15 MG tablet HOLD for 14 days after hip surgery 30 tablet 2    apixaban (ELIQUIS) 2.5 MG TABS tablet Take 1 tablet by mouth 2 times daily Begin day after discharge from hospital and continue for 30 total days 60 tablet 0    simvastatin (ZOCOR) 20 MG tablet TAKE ONE TABLET BY MOUTH ONCE NIGHTLY 30 tablet 1    hydroxychloroquine (PLAQUENIL) 200 MG tablet TAKE ONE TABLET BY MOUTH TWICE A DAY 60 tablet 5    triamterene-hydroCHLOROthiazide (DYAZIDE) 37.5-25 MG per capsule TAKE ONE CAPSULE BY MOUTH DAILY 30 capsule 2    oxybutynin (DITROPAN-XL) 10 MG extended release tablet TAKE ONE TABLET BY MOUTH DAILY 30 tablet 2    levothyroxine (SYNTHROID) 300 MCG tablet TAKE ONE TABLET BY MOUTH DAILY 30 tablet 2    folic acid (FOLVITE) 080 MCG tablet Take 400 mcg by mouth daily      vitamin D (CHOLECALCIFEROL) 1000 UNIT TABS tablet Take 1,000 Units by mouth daily      vitamin B-12 (CYANOCOBALAMIN) 1000 MCG tablet Take 1,000 mcg by mouth daily      Biotin 15092 MCG TABS Take by mouth       No current facility-administered medications for this visit. Objective:   She is alert, oriented x 3, pleasant, well nourished, developed and in no acute distress. Temp 97.5 °F (36.4 °C) (Temporal)   LMP 01/27/2015      HIP EXAM:  Examination of the right hip shows: Incision is healing, no evidence of infection or active drainage. No pain with active or passive range of motion. Still has a Trendelenburg gait. X Rays: was performed in the office today:   Prior x-rays reviewed demonstrate stable right hip arthroplasty. Diagnosis:        ICD-10-CM    1.  History of total hip replacement, right  J27.[facility] Outpatient Physical Therapy - [place]    11/16/2020          Assessment/plan:     Assessment:  Clinically and radiographically stable right total hip arthroplasty. 15 minutes was the total time spent on today's visit  including reviewing test results, obtaining or reviewing history, physical exam, time spent on documentation or ordering prescriptions, tests and procedures after the visit. Plan:  Medications-Tylenol    PT-Rx provided today for strengthening exercises. Trendelenburg gait. Further Imaging-repeat x-rays next visit in 6 weeks. Follow up- 6 weeks. Call or return to clinic if these symptoms worsen or fail to improve as anticipated.

## 2021-01-13 ENCOUNTER — HOSPITAL ENCOUNTER (OUTPATIENT)
Dept: PHYSICAL THERAPY | Age: 56
Setting detail: THERAPIES SERIES
Discharge: HOME | End: 2021-01-13
Payer: MEDICARE

## 2021-01-13 PROCEDURE — 97110 THERAPEUTIC EXERCISES: CPT

## 2021-01-13 PROCEDURE — 97112 NEUROMUSCULAR REEDUCATION: CPT

## 2021-01-13 PROCEDURE — 97530 THERAPEUTIC ACTIVITIES: CPT

## 2021-01-13 NOTE — FLOWSHEET NOTE
[facility]  Outpatient Rehabilitation & Therapy  [address]. [place]  Phone: [phone]   Fax:     [phone]      Physical Therapy Treatment Note/ Progress Report:     Date:  2021    Patient Name:  Abiodun Nunn    :  1965  MRN: [id number]    Pertinent Medical History:     Medical/Treatment Diagnosis Information:   Disregard D/C she was ill. She said the md says she is long on the left. ·  History of right jamey  · Treatment Diagnosis: decreased abilty to ambulate and function    Insurance/Certification information:  PT Insurance Information: Medicare  Physician Information:  Referring Practitioner: Dr. Velia Mccann of care signed (Y/N):     Date of Patient follow up with Physician:      Progress Report: []  Yes  [x]  No     Date Range for reporting period:  Beginnin2021  Ending:      Progress report due (10 Rx/or 30 days whichever is less):      Recertification due (POC duration/ or 90 days whichever is less):      Visit # POC/Insurance Allowable Auth Needed   2 medicare []Yes   []No     Latex Allergy:  [x]NO      []YES  Preferred Language for Healthcare:   [x]English       []Other:    Functional Scale:      Date assessed: at eval  Test: LEFS-18  Score:    Pain level:  10/10     History of Injury: Pt is a 59 y/o female with a hx of right hip pain for a few years with a resultant right jamey on 20  . She was seen at home for a few weeks. She now c/o constant aching pain in her hip and le which is sharper on occasion. She also started having  sharper pain in her lb today and down her right le to her foot which is a 10 /10 today. She has tried walking without the cane or walker recently. She is mainly walking with a cane. She wakes due to pain. She hopes to decrease pain and resume normal activities. She also has a left tka from 4 years ago.  Her goal is to walk normal SUBJECTIVE:  Pt states, \" I want to walk normal \"  12/30/20  Pt states,Pt not seen this day. 1/13/21  Pt states, \" I was sick, still not walking right and my right leg is shorter \"    OBJECTIVE:1/13/21  Hip rom- flex-90, abd-20, ir-20, er-20, strength- 3+/5 hip flex, 4-/5 the rest of right le     ROM LEFT RIGHT   HIP Flex wfl 100   HIP Abd   20   HIP Ext   0   HIP IR   0   HIP ER   20   Knee ext   120   Knee Flex   0   Ankle PF   40   Ankle DF   8   Ankle In       Ankle Ev       Strength  LEFT RIGHT   HIP Flexors   3 unable to do slr   HIP Abductors   4--   HIP Ext   4-   Hip ER   4-   Knee EXT (quad)   4-   Knee Flex (HS)   4-   Ankle DF   5   Ankle PF   5   Ankle Inv   5   Ankle EV   5                              ?  Abdominals-2/5    RESTRICTIONS/PRECAUTIONS: follow precautions for jamey    Exercises/Interventions:     Therapeutic Ex (36752)   Min: Reps/Resistance Notes/CUES   Nu step L3 x 6 min    Leg press 80# x 30    slr X 20 ea    Sl abd X 30 ea                                            Manual Intervention (53175)  Min:     Knee mobs/PROM     Tib/Fem Mobs     Patella Mobs     Ankle mobs     Mfr to gluts, piriformis, itb, add, prom for all hip motions per protocol 15 min          NMR re-education (22919)  Min:  CUES NEEDED   wobble X 3 min ea    Standing march X 5 min    sls X 3 min holding on                        Therapeutic Activity (73159)  Min:     Went over gait with cane and walker, discussed ns posture and the need for it to walk normal, discussed ns to help protect her back and prevent back pain 15 min Assessed gait and tried to correct her gait, no arm swing, throwing her left foot forward.  Decreased lumbar motion, arm swing, 30 min         Modalities  Min:     IFC with      CP after exercises     MH after exercises Other Therapeutic Activities: Pt was educated on PT POC, Diagnosis, Prognosis, pathomechanics as well as frequency and duration of scheduling future physical therapy appointments. Time was also taken on this day to answer all patient questions and participation in PT. Reviewed appointment policy in detail with patient and patient verbalized understanding. Home Exercise Program: Patient was instructed in the following for HEP:     . Patient verbalized/demonstrated understanding and was issued written handout. HEP- Seated hamstring Stretch 30 sec x 5  Seated Calf Stretch           30 sec x 5  Seated Flexion Stretch      30 sec x 5  Seated Heel Slide  x30  Long Arc Quad      x 30  Quad Set              x 30  Prone Flexion       x 20  Straight Leg Raise   x 20  Glut Set                  x 20  Ankle Pump          x 20  Semi Squat         x 20  Ppt x 20  str- x 2 min      Therapeutic Exercise and NMR EXR  [] (77368) Provided verbal/tactile cueing for activities related to strengthening, flexibility, endurance, ROM for improvements in LE, proximal hip, and core control with self care, mobility, lifting, ambulation.  [] (52423) Provided verbal/tactile cueing for activities related to improving balance, coordination, kinesthetic sense, posture, motor skill, proprioception  to assist with LE, proximal hip, and core control in self care, mobility, lifting, ambulation and eccentric single leg control. NMR and Therapeutic Activities:    [] (33186 or 84532) Provided verbal/tactile cueing for activities related to improving balance, coordination, kinesthetic sense, posture, motor skill, proprioception and motor activation to allow for proper function of core, proximal hip and LE with self care and ADLs and functional mobility. [] (32415) Gait Re-education- Provided training and instruction to the patient for proper LE, core and proximal hip recruitment and positioning and eccentric body weight control with ambulation re-education including up and down stairs     Home Exercise Program:    [x] (53782) Reviewed/Progressed HEP activities related to strengthening, flexibility, endurance, ROM of core, proximal hip and LE for functional self-care, mobility, lifting and ambulation/stair navigation   [] (36060)Reviewed/Progressed HEP activities related to improving balance, coordination, kinesthetic sense, posture, motor skill, proprioception of core, proximal hip and LE for self care, mobility, lifting, and ambulation/stair navigation      Manual Treatments:  PROM / STM / Oscillations-Mobs:  G-I, II, III, IV (PA's, Inf., Post.)  [] (50135) Provided manual therapy to mobilize LE, proximal hip and/or LS spine soft tissue/joints for the purpose of modulating pain, promoting relaxation,  increasing ROM, reducing/eliminating soft tissue swelling/inflammation/restriction, improving soft tissue extensibility and allowing for proper ROM for normal function with self care, mobility, lifting and ambulation.      If [facility] Please Indicate Time In/Out  CPT Code Time in Time out                         Charges:  Timed Code Treatment Minutes: 45   Total Treatment Minutes: 45      [x] EVAL (LOW) 66106 (typically 20 minutes face-to-face)  [] EVAL (MOD) 96543 (typically 30 minutes face-to-face)  [] EVAL (HIGH) 86899 (typically 45 minutes face-to-face)  [] RE-EVAL     [x] FK(85224) x 1    [] Dry needle 1 or 2 Muscles (67082)  [] NMR (52805) x     [] Dry needle 3+ Muscles (74842)  [x] Manual (19143) x 1    [] Ultrasound (23998) x  [x] TA (15199) x 1    [] Mech Traction (41717)  [] ES(attended) (96370)     [] ES (un) (90761):   [] Vasopump (31256) [] Ionto (83251)   [] Other:    GOALS:GOALS:  Patient stated goal: \" I want to be able to walk normal \" []? Progressing: []? Met: []? Not Met: []? Adjusted     Therapist goals for Patient:   Short Term Goals: To be achieved in: 4weeks  1. Independent in HEP and progression per patient tolerance, in order to prevent re-injury. []? Progressing: []? Met: []? Not Met: []? Adjusted  2. Patient will have a decrease in pain to facilitate improvement in movement, function, and ADLs as indicated by Functional Deficits. []? Progressing: []? Met: []? Not Met: []? Adjusted     Long Term Goals: To be achieved in: 8 weeks     []? Progressing: []? Met: []? Not Met: []? Adjusted  2. Patient will demonstrate increased AROM to normal for jamey to allow for proper joint functioning as indicated by patients Functional Deficits. []? Progressing: []? Met: []? Not Met: []? Adjusted  3. Patient will demonstrate an increase in Strength to good proximal hip strength and control, within 5lb HHD in LE to allow for proper functional mobility as indicated by patients Functional Deficits. []? Progressing: []? Met: []? Not Met: []? Adjusted  4. Patient will return to 75%functional activities without increased symptoms or restriction. []? Progressing: []? Met: []? Not Met: []? Adjusted  5. (patient specific functional goal)    []? Progressing: []? Met: []? Not Met: []? Adjusted      ASSESSMENT:  See eval    Treatment/Activity Tolerance:  [x] Patient tolerated treatment well [] Patient limited by fatique  [] Patient limited by pain  [] Patient limited by other medical complications  [] Other:     Overall Progression Towards Functional goals/ Treatment Progress Update:  [] Patient is progressing as expected towards functional goals listed. [] Progression is slowed due to complexities/Impairments listed. [] Progression has been slowed due to co-morbidities.   [x] Plan just implemented, too soon to assess goals progression <30days   [] Goals require adjustment due to lack of progress [] Patient is not progressing as expected and requires additional follow up with physician  [] Other    Prognosis for POC: [x] Good [] Fair  [] Poor                                      Medicare    Patient requires continued skilled intervention: [x] Yes  [] No        PLAN: LE arom, prom  strength, proprioception, gait, balance, functional activities. Mfr, joint mobs, mods as needed, hep. Progress as tolerated, follow ant jamey precautions, work on ns posture core stab and gait. Pt came for 2 rx and then got ill and had to take care of her grandchilderen. She is still walking poorly and has decreased rom, strength, and function. Resume previous plan. Work on getting weight on to 1 leg at a time. She is unable to stand on 1 leg alone which is effecting her gait. She is also weak in left hip flex . Work on bilat hip strength, proprio, gait and balance. Work a lot on gait, her gait is poor    [] Continue per plan of care [] Alter current plan (see comments)  [x] Plan of care initiated [] Hold pending MD visit [] Discharge    Electronically signed by: Linda Yang PT    Note: If patient does not return for scheduled/recommended follow up visits, this note will serve as a discharge from care along with the most recent update on progress.

## 2021-01-20 ENCOUNTER — HOSPITAL ENCOUNTER (OUTPATIENT)
Dept: PHYSICAL THERAPY | Age: 56
Setting detail: THERAPIES SERIES
Discharge: HOME | End: 2021-01-20
Payer: MEDICARE

## 2021-01-20 PROCEDURE — 97112 NEUROMUSCULAR REEDUCATION: CPT

## 2021-01-20 PROCEDURE — 97140 MANUAL THERAPY 1/> REGIONS: CPT

## 2021-01-20 PROCEDURE — 97110 THERAPEUTIC EXERCISES: CPT

## 2021-01-20 NOTE — FLOWSHEET NOTE
(Y/N): Date of Patient follow up with Physician:      Progress Report: []  Yes  [x]  No     Date Range for reporting period:  Beginnin2021  Ending:      Progress report due (10 Rx/or 30 days whichever is less): 3/1/24     Recertification due (POC duration/ or 90 days whichever is less):      Visit # POC/Insurance Allowable Auth Needed   3 medicare []Yes   []No     Latex Allergy:  [x]NO      []YES  Preferred Language for Healthcare:   [x]English       []Other:    Functional Scale:      Date assessed: at eval  Test: LEFS-51  Score:    Pain level: 5/10     History of Injury: Pt is a 61 y/o female with a hx of right hip pain for a few years with a resultant right jamey on 20  . She was seen at home for a few weeks. She now c/o constant aching pain in her hip and le which is sharper on occasion. She also started having  sharper pain in her lb today and down her right le to her foot which is a 10 /10 today. She has tried walking without the cane or walker recently. She is mainly walking with a cane. She wakes due to pain. She hopes to decrease pain and resume normal activities. She also has a left tka from 4 years ago. Her goal is to walk normal    SUBJECTIVE:  Pt states, \" I want to walk normal \"  20  Pt states,Pt not seen this day.   21  Pt states, \" I was sick, still not walking right and my right leg is shorter \"   21  Pt states, \" I'm walking better \"    OBJECTIVE:21  Hip rom- flex-90, abd-20, ir-20, er-20, strength- 3+/5 hip flex, 4-/5 the rest of right le     ROM LEFT RIGHT   HIP Flex wfl 100   HIP Abd   20   HIP Ext   0   HIP IR   0   HIP ER   20   Knee ext   120   Knee Flex   0   Ankle PF   40   Ankle DF   8   Ankle In       Ankle Ev       Strength  LEFT RIGHT   HIP Flexors   3 unable to do slr   HIP Abductors   4--   HIP Ext   4-   Hip ER   4-   Knee EXT (quad)   4-   Knee Flex (HS)   4-   Ankle DF   5   Ankle PF   5   Ankle Inv   5   Ankle EV   5                               Abdominals-2/5    RESTRICTIONS/PRECAUTIONS: follow precautions for jamey    Exercises/Interventions:     Therapeutic Ex (71890)   Min: Reps/Resistance Notes/CUES   Nu step L3 x 6 min    Leg press 80# x 30    slr X 20 ea    Sl abd X 30 ea    clams X 30    Partial bridge X 30    Ppt with ball X 30    Ns wall squat X 20                        Manual Intervention (37889)  Min:     Knee mobs/PROM     Tib/Fem Mobs     Patella Mobs     Ankle mobs     Mfr to gluts, piriformis, itb, add, prom for all hip motions per protocol 15 min 15 min         NMR re-education (25408)  Min:  CUES NEEDED   wobble X 3 min ea    Standing march X 5 min    sls X 3 min holding on    Step thru X 3 min                   Therapeutic Activity (49957)  Min:     Went over gait with cane and walker, discussed ns posture and the need for it to walk normal, discussed ns to help protect her back and prevent back pain  Assessed gait and tried to correct her gait, no arm swing, throwing her left foot forward. Decreased lumbar motion, arm swing, 15 min         Modalities  Min:     IFC with      CP after exercises     MH after exercises            Other Therapeutic Activities: Pt was educated on PT POC, Diagnosis, Prognosis, pathomechanics as well as frequency and duration of scheduling future physical therapy appointments. Time was also taken on this day to answer all patient questions and participation in PT. Reviewed appointment policy in detail with patient and patient verbalized understanding. Home Exercise Program: Patient was instructed in the following for HEP:     . Patient verbalized/demonstrated understanding and was issued written handout.   HEP- Seated hamstring Stretch 30 sec x 5  Seated Calf Stretch           30 sec x 5  Seated Flexion Stretch      30 sec x 5  Seated Heel Slide  x30  Long Arc Quad      x 30  Quad Set              x 30  Prone Flexion       x 20  Straight Leg Raise   x 20  Glut Set                  x 20  Ankle Pump tissue swelling/inflammation/restriction, improving soft tissue extensibility and allowing for proper ROM for normal function with self care, mobility, lifting and ambulation. If [facility] Please Indicate Time In/Out  CPT Code Time in Time out                         Charges:  Timed Code Treatment Minutes: 60   Total Treatment Minutes: 60      [] EVAL (LOW) 37753 (typically 20 minutes face-to-face)  [] EVAL (MOD) 28041 (typically 30 minutes face-to-face)  [] EVAL (HIGH) 88644 (typically 45 minutes face-to-face)  [] RE-EVAL     [x] JU(12029) x 2    [] Dry needle 1 or 2 Muscles (72735)  [x] NMR (83446) x  1   [] Dry needle 3+ Muscles (76578)  [x] Manual (29813) x 1    [] Ultrasound (87365) x  [] TA (43739) x    [] Mech Traction (95431)  [] ES(attended) (77799)     [] ES (un) (34790):   [] Vasopump (47809) [] Ionto (76476)   [] Other:    GOALS:GOALS:  Patient stated goal: \" I want to be able to walk normal \"  []? Progressing: []? Met: []? Not Met: []? Adjusted     Therapist goals for Patient:   Short Term Goals: To be achieved in: 4weeks  1. Independent in HEP and progression per patient tolerance, in order to prevent re-injury. []? Progressing: [x]? Met: []? Not Met: []? Adjusted  2. Patient will have a decrease in pain to facilitate improvement in movement, function, and ADLs as indicated by Functional Deficits. []? Progressing: [x]? Met: []? Not Met: []? Adjusted     Long Term Goals: To be achieved in: 8 weeks     [x]? Progressing: []? Met: []? Not Met: []? Adjusted  2. Patient will demonstrate increased AROM to normal for jamey to allow for proper joint functioning as indicated by patients Functional Deficits. [x]? Progressing: []? Met: []? Not Met: []? Adjusted  3. Patient will demonstrate an increase in Strength to good proximal hip strength and control, within 5lb HHD in LE to allow for proper functional mobility as indicated by patients Functional Deficits. [x]? Progressing: []? Met: []?  Not Met: []? Mac PT    Note: If patient does not return for scheduled/recommended follow up visits, this note will serve as a discharge from care along with the most recent update on progress.

## 2021-01-27 ENCOUNTER — HOSPITAL ENCOUNTER (OUTPATIENT)
Dept: PHYSICAL THERAPY | Age: 56
Setting detail: THERAPIES SERIES
Discharge: HOME | End: 2021-01-27
Payer: MEDICARE

## 2021-01-27 NOTE — FLOWSHEET NOTE
Physical Therapy  Cancellation/No-show Note  Patient Name:  Summer Parrish  :  1965   Date:  2021  Cancelled visits to date: 2  No-shows to date:     For today's appointment patient:  [x]  Cancelled  []  Rescheduled appointment  []  No-show     Reason given by patient:  [x]  Patient ill  []  Conflicting appointment  []  No transportation    []  Conflict with work  []  No reason given  []  Other:     Comments:      Electronically signed by:  Linda Yang PT

## 2021-01-28 RX ORDER — SIMVASTATIN 20 MG
TABLET ORAL
Qty: 30 TABLET | Refills: 0 | Status: SHIPPED | OUTPATIENT
Start: 2021-01-28 | End: 2021-02-27

## 2021-01-28 RX ORDER — LEVOTHYROXINE SODIUM 300 UG/1
TABLET ORAL
Qty: 90 TABLET | Refills: 1 | Status: SHIPPED | OUTPATIENT
Start: 2021-01-28 | End: 2021-02-14

## 2021-02-03 ENCOUNTER — HOSPITAL ENCOUNTER (OUTPATIENT)
Dept: PHYSICAL THERAPY | Age: 56
Setting detail: THERAPIES SERIES
Discharge: HOME | End: 2021-02-03
Payer: MEDICARE

## 2021-02-09 ENCOUNTER — APPOINTMENT (OUTPATIENT)
Dept: GENERAL RADIOLOGY | Age: 56
End: 2021-02-09
Payer: MEDICARE

## 2021-02-09 ENCOUNTER — TELEPHONE (OUTPATIENT)
Dept: RHEUMATOLOGY | Age: 56
End: 2021-02-09

## 2021-02-09 ENCOUNTER — HOSPITAL ENCOUNTER (OUTPATIENT)
Age: 56
Setting detail: OBSERVATION
Discharge: HOME | End: 2021-02-11
Attending: EMERGENCY MEDICINE | Admitting: HOSPITALIST
Payer: MEDICARE

## 2021-02-09 DIAGNOSIS — M54.42 ACUTE LEFT-SIDED LOW BACK PAIN WITH LEFT-SIDED SCIATICA: Primary | ICD-10-CM

## 2021-02-09 DIAGNOSIS — M54.9 INTRACTABLE BACK PAIN: ICD-10-CM

## 2021-02-09 DIAGNOSIS — Z91.81 AT HIGH RISK FOR INJURY RELATED TO FALL: ICD-10-CM

## 2021-02-09 PROCEDURE — 96375 TX/PRO/DX INJ NEW DRUG ADDON: CPT

## 2021-02-09 PROCEDURE — 2580000003 HC RX 258: Performed by: EMERGENCY MEDICINE

## 2021-02-09 PROCEDURE — 99285 EMERGENCY DEPT VISIT HI MDM: CPT

## 2021-02-09 PROCEDURE — 6360000002 HC RX W HCPCS: Performed by: PHYSICIAN ASSISTANT

## 2021-02-09 PROCEDURE — 6360000002 HC RX W HCPCS: Performed by: EMERGENCY MEDICINE

## 2021-02-09 PROCEDURE — 96374 THER/PROPH/DIAG INJ IV PUSH: CPT

## 2021-02-09 PROCEDURE — 96372 THER/PROPH/DIAG INJ SC/IM: CPT

## 2021-02-09 PROCEDURE — 72100 X-RAY EXAM L-S SPINE 2/3 VWS: CPT

## 2021-02-09 PROCEDURE — 6370000000 HC RX 637 (ALT 250 FOR IP): Performed by: PHYSICIAN ASSISTANT

## 2021-02-09 RX ORDER — OXYCODONE HYDROCHLORIDE AND ACETAMINOPHEN 5; 325 MG/1; MG/1
2 TABLET ORAL ONCE
Status: COMPLETED | OUTPATIENT
Start: 2021-02-09 | End: 2021-02-09

## 2021-02-09 RX ORDER — METHOCARBAMOL 750 MG/1
750 TABLET, FILM COATED ORAL ONCE
Status: COMPLETED | OUTPATIENT
Start: 2021-02-09 | End: 2021-02-09

## 2021-02-09 RX ORDER — IBUPROFEN 400 MG/1
800 TABLET ORAL ONCE
Status: COMPLETED | OUTPATIENT
Start: 2021-02-09 | End: 2021-02-09

## 2021-02-09 RX ORDER — 0.9 % SODIUM CHLORIDE 0.9 %
1000 INTRAVENOUS SOLUTION INTRAVENOUS ONCE
Status: COMPLETED | OUTPATIENT
Start: 2021-02-09 | End: 2021-02-10

## 2021-02-09 RX ORDER — FENTANYL CITRATE 50 UG/ML
100 INJECTION, SOLUTION INTRAMUSCULAR; INTRAVENOUS ONCE
Status: COMPLETED | OUTPATIENT
Start: 2021-02-09 | End: 2021-02-09

## 2021-02-09 RX ORDER — LORAZEPAM 2 MG/ML
1 INJECTION INTRAMUSCULAR ONCE
Status: COMPLETED | OUTPATIENT
Start: 2021-02-09 | End: 2021-02-09

## 2021-02-09 RX ADMIN — METHOCARBAMOL TABLETS 750 MG: 750 TABLET, COATED ORAL at 20:15

## 2021-02-09 RX ADMIN — IBUPROFEN 800 MG: 400 TABLET, FILM COATED ORAL at 20:15

## 2021-02-09 RX ADMIN — FENTANYL CITRATE 100 MCG: 50 INJECTION, SOLUTION INTRAMUSCULAR; INTRAVENOUS at 23:57

## 2021-02-09 RX ADMIN — OXYCODONE HYDROCHLORIDE AND ACETAMINOPHEN 2 TABLET: 5; 325 TABLET ORAL at 20:15

## 2021-02-09 RX ADMIN — LORAZEPAM 1 MG: 2 INJECTION INTRAMUSCULAR; INTRAVENOUS at 23:57

## 2021-02-09 RX ADMIN — SODIUM CHLORIDE 1000 ML: 9 INJECTION, SOLUTION INTRAVENOUS at 23:57

## 2021-02-09 RX ADMIN — HYDROMORPHONE HYDROCHLORIDE 1 MG: 1 INJECTION, SOLUTION INTRAMUSCULAR; INTRAVENOUS; SUBCUTANEOUS at 22:47

## 2021-02-09 ASSESSMENT — ENCOUNTER SYMPTOMS
APNEA: 0
NAUSEA: 0
EYE REDNESS: 0
FACIAL SWELLING: 0
SHORTNESS OF BREATH: 0
ABDOMINAL PAIN: 0
EYE DISCHARGE: 0
BACK PAIN: 1
VOMITING: 0
CHOKING: 0
SORE THROAT: 0

## 2021-02-09 ASSESSMENT — PAIN DESCRIPTION - LOCATION: LOCATION: BACK

## 2021-02-09 ASSESSMENT — PAIN SCALES - GENERAL
PAINLEVEL_OUTOF10: 2
PAINLEVEL_OUTOF10: 5

## 2021-02-09 ASSESSMENT — PAIN DESCRIPTION - ONSET: ONSET: ON-GOING

## 2021-02-09 ASSESSMENT — PAIN DESCRIPTION - PROGRESSION: CLINICAL_PROGRESSION: NOT CHANGED

## 2021-02-09 NOTE — TELEPHONE ENCOUNTER
I have not seen her since May 2020.   She needs to make a follow-up appointment to discuss other treatment options

## 2021-02-09 NOTE — ED NOTES
Bed: D-39  Expected date: 2/9/21  Expected time: 6:31 PM  Means of arrival:   Comments:  Darien Marte RN  02/09/21 [id number]

## 2021-02-09 NOTE — TELEPHONE ENCOUNTER
Called patient made appt at [facility] office to be seen in office-states she stopped the MTX-her eye is very blurry. The soonest I could get her in is 2/25/21-patient states that is fine. She will call if gets worse.

## 2021-02-09 NOTE — TELEPHONE ENCOUNTER
Patient stopped taking the hydroxychloroquine 2 weeks ago because her left eye went completely blurry. Patient requests new alternative because she is in a lot of pain.

## 2021-02-10 ENCOUNTER — TELEPHONE (OUTPATIENT)
Dept: FAMILY MEDICINE CLINIC | Age: 56
End: 2021-02-10

## 2021-02-10 DIAGNOSIS — M54.31 BILATERAL SCIATICA: Primary | ICD-10-CM

## 2021-02-10 DIAGNOSIS — M54.32 BILATERAL SCIATICA: Primary | ICD-10-CM

## 2021-02-10 LAB
ANION GAP SERPL CALCULATED.3IONS-SCNC: 13 MMOL/L (ref 3–16)
BASOPHILS ABSOLUTE: 0 K/UL (ref 0–0.2)
BASOPHILS RELATIVE PERCENT: 0.4 %
BUN BLDV-MCNC: 17 MG/DL (ref 7–20)
CALCIUM SERPL-MCNC: 9.5 MG/DL (ref 8.3–10.6)
CHLORIDE BLD-SCNC: 97 MMOL/L (ref 99–110)
CO2: 25 MMOL/L (ref 21–32)
CREAT SERPL-MCNC: 0.9 MG/DL (ref 0.6–1.1)
EOSINOPHILS ABSOLUTE: 0.5 K/UL (ref 0–0.6)
EOSINOPHILS RELATIVE PERCENT: 4.1 %
GFR AFRICAN AMERICAN: >60
GFR NON-AFRICAN AMERICAN: >60
GLUCOSE BLD-MCNC: 104 MG/DL (ref 70–99)
HCT VFR BLD CALC: 46.3 % (ref 36–48)
HEMOGLOBIN: 15.1 G/DL (ref 12–16)
LYMPHOCYTES ABSOLUTE: 3.2 K/UL (ref 1–5.1)
LYMPHOCYTES RELATIVE PERCENT: 26.7 %
MCH RBC QN AUTO: 30 PG (ref 26–34)
MCHC RBC AUTO-ENTMCNC: 32.6 G/DL (ref 31–36)
MCV RBC AUTO: 92.2 FL (ref 80–100)
MONOCYTES ABSOLUTE: 1 K/UL (ref 0–1.3)
MONOCYTES RELATIVE PERCENT: 7.9 %
NEUTROPHILS ABSOLUTE: 7.4 K/UL (ref 1.7–7.7)
NEUTROPHILS RELATIVE PERCENT: 60.9 %
PDW BLD-RTO: 16.6 % (ref 12.4–15.4)
PLATELET # BLD: 174 K/UL (ref 135–450)
PMV BLD AUTO: 10.7 FL (ref 5–10.5)
POTASSIUM REFLEX MAGNESIUM: 4.2 MMOL/L (ref 3.5–5.1)
RBC # BLD: 5.03 M/UL (ref 4–5.2)
SODIUM BLD-SCNC: 135 MMOL/L (ref 136–145)
WBC # BLD: 12.1 K/UL (ref 4–11)

## 2021-02-10 PROCEDURE — 2700000000 HC OXYGEN THERAPY PER DAY

## 2021-02-10 PROCEDURE — 36415 COLL VENOUS BLD VENIPUNCTURE: CPT

## 2021-02-10 PROCEDURE — 6370000000 HC RX 637 (ALT 250 FOR IP): Performed by: STUDENT IN AN ORGANIZED HEALTH CARE EDUCATION/TRAINING PROGRAM

## 2021-02-10 PROCEDURE — G0378 HOSPITAL OBSERVATION PER HR: HCPCS

## 2021-02-10 PROCEDURE — 6370000000 HC RX 637 (ALT 250 FOR IP): Performed by: HOSPITALIST

## 2021-02-10 PROCEDURE — 97165 OT EVAL LOW COMPLEX 30 MIN: CPT

## 2021-02-10 PROCEDURE — 85025 COMPLETE CBC W/AUTO DIFF WBC: CPT

## 2021-02-10 PROCEDURE — 1200000000 HC SEMI PRIVATE

## 2021-02-10 PROCEDURE — 97530 THERAPEUTIC ACTIVITIES: CPT

## 2021-02-10 PROCEDURE — 97161 PT EVAL LOW COMPLEX 20 MIN: CPT

## 2021-02-10 PROCEDURE — 2580000003 HC RX 258: Performed by: STUDENT IN AN ORGANIZED HEALTH CARE EDUCATION/TRAINING PROGRAM

## 2021-02-10 PROCEDURE — 94760 N-INVAS EAR/PLS OXIMETRY 1: CPT

## 2021-02-10 PROCEDURE — 80048 BASIC METABOLIC PNL TOTAL CA: CPT

## 2021-02-10 PROCEDURE — 6360000002 HC RX W HCPCS: Performed by: STUDENT IN AN ORGANIZED HEALTH CARE EDUCATION/TRAINING PROGRAM

## 2021-02-10 RX ORDER — HYDROXYCHLOROQUINE SULFATE 200 MG/1
200 TABLET, FILM COATED ORAL 2 TIMES DAILY
Status: DISCONTINUED | OUTPATIENT
Start: 2021-02-10 | End: 2021-02-11 | Stop reason: HOSPADM

## 2021-02-10 RX ORDER — POLYETHYLENE GLYCOL 3350 17 G/17G
17 POWDER, FOR SOLUTION ORAL DAILY PRN
Status: DISCONTINUED | OUTPATIENT
Start: 2021-02-10 | End: 2021-02-11 | Stop reason: HOSPADM

## 2021-02-10 RX ORDER — NICOTINE 21 MG/24HR
1 PATCH, TRANSDERMAL 24 HOURS TRANSDERMAL DAILY
Status: DISCONTINUED | OUTPATIENT
Start: 2021-02-10 | End: 2021-02-11 | Stop reason: HOSPADM

## 2021-02-10 RX ORDER — OXYCODONE HYDROCHLORIDE AND ACETAMINOPHEN 5; 325 MG/1; MG/1
2 TABLET ORAL EVERY 4 HOURS PRN
Status: DISCONTINUED | OUTPATIENT
Start: 2021-02-10 | End: 2021-02-11 | Stop reason: HOSPADM

## 2021-02-10 RX ORDER — LEVOTHYROXINE SODIUM 0.1 MG/1
300 TABLET ORAL DAILY
Status: DISCONTINUED | OUTPATIENT
Start: 2021-02-10 | End: 2021-02-11 | Stop reason: HOSPADM

## 2021-02-10 RX ORDER — ACETAMINOPHEN 325 MG/1
650 TABLET ORAL EVERY 6 HOURS PRN
Status: DISCONTINUED | OUTPATIENT
Start: 2021-02-10 | End: 2021-02-11 | Stop reason: HOSPADM

## 2021-02-10 RX ORDER — MORPHINE SULFATE 4 MG/ML
4 INJECTION, SOLUTION INTRAMUSCULAR; INTRAVENOUS EVERY 4 HOURS PRN
Status: DISCONTINUED | OUTPATIENT
Start: 2021-02-10 | End: 2021-02-11 | Stop reason: HOSPADM

## 2021-02-10 RX ORDER — LISINOPRIL 10 MG/1
10 TABLET ORAL DAILY
Status: DISCONTINUED | OUTPATIENT
Start: 2021-02-10 | End: 2021-02-11 | Stop reason: HOSPADM

## 2021-02-10 RX ORDER — SODIUM CHLORIDE 0.9 % (FLUSH) 0.9 %
10 SYRINGE (ML) INJECTION EVERY 12 HOURS SCHEDULED
Status: DISCONTINUED | OUTPATIENT
Start: 2021-02-10 | End: 2021-02-11 | Stop reason: HOSPADM

## 2021-02-10 RX ORDER — ACETAMINOPHEN 650 MG/1
650 SUPPOSITORY RECTAL EVERY 6 HOURS PRN
Status: DISCONTINUED | OUTPATIENT
Start: 2021-02-10 | End: 2021-02-11 | Stop reason: HOSPADM

## 2021-02-10 RX ORDER — LIDOCAINE 4 G/G
1 PATCH TOPICAL DAILY
Status: DISCONTINUED | OUTPATIENT
Start: 2021-02-10 | End: 2021-02-11 | Stop reason: HOSPADM

## 2021-02-10 RX ORDER — KETOROLAC TROMETHAMINE 15 MG/ML
15 INJECTION, SOLUTION INTRAMUSCULAR; INTRAVENOUS EVERY 6 HOURS PRN
Status: DISCONTINUED | OUTPATIENT
Start: 2021-02-10 | End: 2021-02-11 | Stop reason: HOSPADM

## 2021-02-10 RX ORDER — ATORVASTATIN CALCIUM 10 MG/1
10 TABLET, FILM COATED ORAL NIGHTLY
Status: DISCONTINUED | OUTPATIENT
Start: 2021-02-10 | End: 2021-02-11 | Stop reason: HOSPADM

## 2021-02-10 RX ORDER — LORAZEPAM 2 MG/ML
1 INJECTION INTRAMUSCULAR ONCE
Status: DISCONTINUED | OUTPATIENT
Start: 2021-02-10 | End: 2021-02-11 | Stop reason: HOSPADM

## 2021-02-10 RX ORDER — ONDANSETRON 2 MG/ML
4 INJECTION INTRAMUSCULAR; INTRAVENOUS EVERY 6 HOURS PRN
Status: DISCONTINUED | OUTPATIENT
Start: 2021-02-10 | End: 2021-02-11 | Stop reason: HOSPADM

## 2021-02-10 RX ORDER — OXYCODONE HYDROCHLORIDE AND ACETAMINOPHEN 5; 325 MG/1; MG/1
1 TABLET ORAL EVERY 4 HOURS PRN
Status: DISCONTINUED | OUTPATIENT
Start: 2021-02-10 | End: 2021-02-11 | Stop reason: HOSPADM

## 2021-02-10 RX ORDER — SODIUM CHLORIDE 0.9 % (FLUSH) 0.9 %
10 SYRINGE (ML) INJECTION PRN
Status: DISCONTINUED | OUTPATIENT
Start: 2021-02-10 | End: 2021-02-11 | Stop reason: HOSPADM

## 2021-02-10 RX ORDER — PROMETHAZINE HYDROCHLORIDE 25 MG/1
12.5 TABLET ORAL EVERY 6 HOURS PRN
Status: DISCONTINUED | OUTPATIENT
Start: 2021-02-10 | End: 2021-02-11 | Stop reason: HOSPADM

## 2021-02-10 RX ADMIN — LISINOPRIL 10 MG: 10 TABLET ORAL at 10:07

## 2021-02-10 RX ADMIN — ENOXAPARIN SODIUM 40 MG: 40 INJECTION SUBCUTANEOUS at 10:08

## 2021-02-10 RX ADMIN — OXYCODONE HYDROCHLORIDE AND ACETAMINOPHEN 2 TABLET: 5; 325 TABLET ORAL at 15:22

## 2021-02-10 RX ADMIN — OXYCODONE HYDROCHLORIDE AND ACETAMINOPHEN 1 TABLET: 5; 325 TABLET ORAL at 22:12

## 2021-02-10 RX ADMIN — MORPHINE SULFATE 4 MG: 4 INJECTION, SOLUTION INTRAMUSCULAR; INTRAVENOUS at 07:53

## 2021-02-10 RX ADMIN — ATORVASTATIN CALCIUM 10 MG: 10 TABLET, FILM COATED ORAL at 22:09

## 2021-02-10 RX ADMIN — KETOROLAC TROMETHAMINE 15 MG: 15 INJECTION, SOLUTION INTRAMUSCULAR; INTRAVENOUS at 03:40

## 2021-02-10 RX ADMIN — SODIUM CHLORIDE, PRESERVATIVE FREE 10 ML: 5 INJECTION INTRAVENOUS at 22:10

## 2021-02-10 RX ADMIN — ONDANSETRON 4 MG: 2 INJECTION INTRAMUSCULAR; INTRAVENOUS at 07:58

## 2021-02-10 RX ADMIN — LEVOTHYROXINE SODIUM 300 MCG: 0.1 TABLET ORAL at 06:31

## 2021-02-10 ASSESSMENT — PAIN DESCRIPTION - FREQUENCY: FREQUENCY: CONTINUOUS

## 2021-02-10 ASSESSMENT — PAIN SCALES - GENERAL
PAINLEVEL_OUTOF10: 0
PAINLEVEL_OUTOF10: 5
PAINLEVEL_OUTOF10: 7

## 2021-02-10 ASSESSMENT — ENCOUNTER SYMPTOMS
COLOR CHANGE: 0
COUGH: 0
BACK PAIN: 1
CONSTIPATION: 0
ABDOMINAL PAIN: 0
VOMITING: 0
EYE DISCHARGE: 0
SHORTNESS OF BREATH: 0
EYE ITCHING: 0

## 2021-02-10 ASSESSMENT — PAIN DESCRIPTION - PAIN TYPE: TYPE: ACUTE PAIN

## 2021-02-10 ASSESSMENT — PAIN DESCRIPTION - ONSET: ONSET: GRADUAL

## 2021-02-10 ASSESSMENT — PAIN DESCRIPTION - PROGRESSION: CLINICAL_PROGRESSION: GRADUALLY IMPROVING

## 2021-02-10 ASSESSMENT — PAIN DESCRIPTION - ORIENTATION: ORIENTATION: LEFT

## 2021-02-10 NOTE — PROGRESS NOTES
Physical Therapy    Facility/Department: [facility] 3W ORTHOPEDICS  Initial Assessment/Discharge Summary    NAME: Gene Lee  : 1965  MRN: [id number]    Date of Service: 2/10/2021    Discharge Recommendations:  Patient would benefit from continued therapy after discharge, Home with assist PRN   PT Equipment Recommendations  Equipment Needed: No    Assessment   Body structures, Functions, Activity limitations: Decreased functional mobility ; Decreased balance  Assessment: Pt is a 60 y.o. F. under observation for L-sided back pain with sciatica. Pt reported severe pain initially, but states it has subsided. She has a hx of chronic (B) hip pain/dysfunction, and moves with antalgic gait, but was able to complete mobility tasks in her room independently without device. She does not want home care as she plans to return to OP PT (had been participating prior to this visit). PT anticipates safe return home with prn assist, and f/u with OP PT. Pt has RW for additional support if needed. Gene Lee scored a 23/24 on the AM-PAC short mobility form. See above for discharge recommendations. If patient discharges prior to next session this note will serve as a discharge summary. Please see below for the latest assessment towards goals. Specific instructions for Next Treatment: Improve strength, balance, endurance  Prognosis: Good  Decision Making: Low Complexity  History: See below  Exam: Strength; ROM; Balance; Ambulation  Clinical Presentation: Stable  PT Education: Goals; General Safety;Gait Training;PT Role;Orientation;Plan of Care;Home Exercise Program;Precautions;Transfer Training;Energy Conservation;Equipment  Barriers to Learning: Pain  REQUIRES PT FOLLOW UP: Yes  Activity Tolerance  Activity Tolerance: Patient Tolerated treatment well;Patient limited by endurance       Patient Diagnosis(es): The primary encounter diagnosis was Acute left-sided low back pain with left-sided sciatica.  A diagnosis of At high risk for injury related to fall was also pertinent to this visit. has a past medical history of Anemia, Anesthesia complication, Anxiety and depression, Chronic back pain, Condyloma acuminatum, Headache(784.0), Hyperlipidemia, Hypertension, Hypothyroidism, IBS (irritable bowel syndrome), Metrorrhagia, Neuropathy, Osteoarthritis, Urgency of urination, and Wears glasses. has a past surgical history that includes [place] tooth extraction; Total hip arthroplasty (Left, 08/16/2016); LEEP; and Total hip arthroplasty (Right, 11/16/2020). Restrictions  Restrictions/Precautions  Restrictions/Precautions: Fall Risk  Position Activity Restriction  Other position/activity restrictions: Hx of back pain     Vision/Hearing  Vision: Impaired  Vision Exceptions: (C/o change in vision after switching medications recently)  Hearing: Within functional limits       Subjective  General  Chart Reviewed: Yes  Patient assessed for rehabilitation services?: Yes  Additional Pertinent Hx: Per Dr. Broderick Ask, \"Shania Monterroso is a 60 y.o. female who presents to the emergency department with back pain. Patient endorses left-sided back pain radiating down the left leg. Started spontaneously today. No trauma. Pain is acute 9/10 sharp and constant in nature. Has been taking OTC medications with minimal relief. Nothing makes symptoms better but movement makes symptoms worse. This has never happened before. No other associated symptoms other than previously mentioned. Patient denies weakness, saddle numbness, loss of bowel or bladder function. \"  Response To Previous Treatment: Not applicable  Referring Practitioner: Dr. Gema Christie  Referral Date : 02/10/21  Diagnosis: Acute L-sided LBP with sciatica; High fall risk  Follows Commands: Within Functional Limits  Subjective  Subjective: Pt denies pain at rest.  Agreeable to assessment. Jake Joshua to go home and return to OP therapy.   Pain Screening  Patient Currently in Pain: Yes    Orientation  Orientation  Overall Orientation Status: Within Functional Limits     Social/Functional History  Social/Functional History  Lives With: Alone  Type of Home: House  Home Layout: One level  Home Access: Stairs to enter without rails  Entrance Stairs - Number of Steps: 2  Bathroom Shower/Tub: Tub/Shower unit  Bathroom Toilet: Standard  Bathroom Equipment: Shower chair, Grab bars in shower, Toilet raiser, 3-in-1 commode  Home Equipment: Rolling walker(Rollator)  ADL Assistance: Independent  Homemaking Assistance: Independent  Ambulation Assistance: Independent((I) without AD)  Transfer Assistance: Independent  Active : Yes  Mode of Transportation: Car  Additional Comments: Denies hx of falls    Objective    AROM RLE (degrees)  RLE AROM: WFL  RLE General AROM: Hip Flex, knee Flex/Ext, and Ankle PF/DF WFL  AROM LLE (degrees)  LLE AROM : WFL  LLE General AROM: Hip Flex, knee Flex/Ext, and Ankle PF/DF WFL  AROM RUE (degrees)  RUE AROM : WFL  RUE General AROM: Shoulder Flex, Elbow Flex/Ext WFL  AROM LUE (degrees)  LUE AROM : WFL  LUE General AROM: Shoulder Flex, Elbow Flex/Ext WFL     Strength RLE  Strength RLE: WFL  Comment: Hip Flex, Knee Flex/Ext, and Ankle PF/DF grossly 4/5  Strength LLE  Strength LLE: WFL  Comment: Hip Flex, Knee Flex/Ext, and Ankle PF/DF grossly 4/5  Strength RUE  Strength RUE: WFL  Comment: Shoulder Flex, Elbow Flex/Ext WFL  Strength LUE  Strength LUE: WFL  Comment: Shoulder Flex, Elbow Flex/Ext WFL     Tone RLE  RLE Tone: Normotonic  Tone LLE  LLE Tone: Normotonic  Motor Control  Gross Motor?: WFL     Bed mobility  Supine to Sit: Independent     Transfers  Sit to Stand: Independent  Stand to sit: Independent  Stand Pivot Transfers: Independent  Comment: Pt able to complete toilet transfer independently.      Ambulation  Ambulation?: Yes  Ambulation 1  Surface: level tile  Device: No Device  Assistance: Independent  Quality of Gait: Pt ambulated stiffly with wide TUTU, decreased stance time on LLE, decreased step length, no LOB. Distance: 48' x 2  Stairs/Curb  Stairs?: No    Plan   Plan  Times per week: 2-3x  Specific instructions for Next Treatment: Improve strength, balance, endurance  Current Treatment Recommendations: Strengthening, ROM, Balance Training, Functional Mobility Training, Transfer Training, Gait Training, Stair training, Positioning, Pain Management, Modalities, Equipment Evaluation, Education, & procurement, Manual Therapy - Joint Manipulation, Patient/Caregiver Education & Training, Safety Education & Training, Manual Therapy - Soft Tissue Mobilization, IADL Training, Neuromuscular Re-education, Home Exercise Program, Integrated Dry Needling, Endurance Training  Safety Devices  Type of devices: All fall risk precautions in place, Call light within reach, Left in chair, Nurse notified, Gait belt  Restraints  Initially in place: No    AM-PAC Score  AM-PAC Inpatient Mobility Raw Score : 23 (02/10/21 1534)  AM-PAC Inpatient T-Scale Score : 56.93 (02/10/21 1534)  Mobility Inpatient CMS 0-100% Score: 11.2 (02/10/21 1534)  Mobility Inpatient CMS G-Code Modifier : CI (02/10/21 1534)          Goals  Short term goals  Time Frame for Short term goals: No acute PT goals  Patient Goals   Patient goals : To return to OP PT       Therapy Time   Individual Concurrent Group Co-treatment   Time In 1346         Time Out 1426         Minutes 40         Timed Code Treatment Minutes: 25 Minutes   Evaluation time: 15 min.      Karina Oscar PT    Electronically signed by Karina Oscar, PT [id number] on 2/10/2021 at 3:38 PM

## 2021-02-10 NOTE — ED PROVIDER NOTES
[address] [facility] ENCOUNTER      Pt Name: Estela Moran  SYU:[id number]  [place] 1965  Date of evaluation: 2/9/2021  Provider: Everardo Downey PA-C     This patient was seen and evaluated by attending physician Dr. Ramiro Oppenheim, MD      Chief Complaint:    Chief Complaint   Patient presents with    Back Pain     NKI       Nursing Notes, Past Medical Hx, Past Surgical Hx, Social Hx, Allergies, and Family Hx were all reviewed and agreed with or any disagreements were addressed in the HPI.    HPI:  (Location, Duration, Timing, Severity, Quality, Assoc Sx, Context, Modifying factors)  This is a  60 y.o. female complain of low back pain. Patient said around 5:00 this afternoon she was getting out of her vehicle and so when she stepped her leg up she got this sharp pain in the left side of her back that went down her left leg. She denies any heavy lifting no falls. Denies abdominal pain. No loss of bowel or urinary control. Denies chest pain or upper back pain. She has had sciatica on the right and feels the same on the left. She has not taken anything for the pain. She can barely move. Denies any back surgery. But she has had bilateral hip replacements this was done by Dr. Ti Zapata.       PastMedical/Surgical History:      Diagnosis Date    Anemia     Anesthesia complication     severe headache after woke up after wisdom teeth    Anxiety and depression     Chronic back pain     Condyloma acuminatum     Headache(784.0)     Hyperlipidemia     Hypertension     Hypothyroidism     IBS (irritable bowel syndrome)     Metrorrhagia     Neuropathy     Osteoarthritis     Urgency of urination     Wears glasses     driving at night/reading         Procedure Laterality Date    LEEP      TOTAL HIP ARTHROPLASTY Left 08/16/2016    TOTAL HIP ARTHROPLASTY Right 11/16/2020    RIGHT LATERAL TOTAL HIP REPLACEMENT performed by Celina Frankel MD at [facility]    WISDOM TOOTH EXTRACTION         Medications:  Previous Medications    APIXABAN (ELIQUIS) 2.5 MG TABS TABLET    Take 1 tablet by mouth 2 times daily Begin day after discharge from hospital and continue for 30 total days    BIOTIN 80019 MCG TABS    Take by mouth    FOLIC ACID (FOLVITE) 859 MCG TABLET    Take 400 mcg by mouth daily    HYDROXYCHLOROQUINE (PLAQUENIL) 200 MG TABLET    TAKE ONE TABLET BY MOUTH TWICE A DAY    LEVOTHYROXINE (SYNTHROID) 300 MCG TABLET    TAKE ONE TABLET BY MOUTH DAILY    MELOXICAM (MOBIC) 15 MG TABLET    HOLD for 14 days after hip surgery    OXYBUTYNIN (DITROPAN-XL) 10 MG EXTENDED RELEASE TABLET    TAKE ONE TABLET BY MOUTH DAILY    SIMVASTATIN (ZOCOR) 20 MG TABLET    TAKE ONE TABLET BY MOUTH ONCE NIGHTLY    TRIAMTERENE-HYDROCHLOROTHIAZIDE (DYAZIDE) 37.5-25 MG PER CAPSULE    TAKE ONE CAPSULE BY MOUTH DAILY    VITAMIN B-12 (CYANOCOBALAMIN) 1000 MCG TABLET    Take 1,000 mcg by mouth daily    VITAMIN D (CHOLECALCIFEROL) 1000 UNIT TABS TABLET    Take 1,000 Units by mouth daily         Review of Systems:  Review of Systems   Constitutional: Negative for chills and fever. HENT: Negative for congestion, facial swelling and sore throat. Eyes: Negative for discharge and redness. Respiratory: Negative for apnea, choking and shortness of breath. Cardiovascular: Negative for chest pain. Gastrointestinal: Negative for abdominal pain, nausea and vomiting. Genitourinary: Negative for dysuria. Musculoskeletal: Positive for back pain. Negative for neck pain and neck stiffness. Neurological: Negative for dizziness, tremors, seizures, weakness and headaches. All other systems reviewed and are negative. Positives and Pertinent negatives as per HPI. Except as noted above in the ROS, problem specific ROS was completed and is negative. Physical Exam:  Physical Exam  Vitals signs and nursing note reviewed. Constitutional:       Appearance: She is well-developed.  She is not diaphoretic. HENT:      Head: Normocephalic and atraumatic. Nose: Nose normal.      Mouth/Throat:      Mouth: Mucous membranes are moist.      Pharynx: Oropharynx is clear. Eyes:      General:         Right eye: No discharge. Left eye: No discharge. Neck:      Musculoskeletal: Normal range of motion and neck supple. Cardiovascular:      Rate and Rhythm: Normal rate and regular rhythm. Heart sounds: Normal heart sounds. No murmur. No friction rub. No gallop. Pulmonary:      Effort: Pulmonary effort is normal. No respiratory distress. Breath sounds: Normal breath sounds. No wheezing or rales. Chest:      Chest wall: No tenderness. Abdominal:      General: Abdomen is flat. Bowel sounds are normal. There is no distension. Palpations: Abdomen is soft. There is no mass. Tenderness: There is no abdominal tenderness. There is no guarding or rebound. Musculoskeletal:      Cervical back: She exhibits normal range of motion, no tenderness and no bony tenderness. Thoracic back: She exhibits normal range of motion, no tenderness and no bony tenderness. Lumbar back: She exhibits decreased range of motion, tenderness and bony tenderness. Back:    Skin:     General: Skin is warm and dry. Neurological:      Mental Status: She is alert and oriented to person, place, and time.    Psychiatric:         Behavior: Behavior normal.         MEDICAL DECISION MAKING    Vitals:    Vitals:    02/09/21 2315 02/09/21 2325 02/09/21 2352 02/09/21 2355   BP:    (!) 142/56   Pulse:  82  71   Resp:    18   Temp:       TempSrc:       SpO2: 94% 97% (!) 88% 99%   Height:           LABS:  Labs Reviewed   CBC WITH AUTO DIFFERENTIAL - Abnormal; Notable for the following components:       Result Value    WBC 12.1 (*)     RDW 16.6 (*)     MPV 10.7 (*)     All other components within normal limits    Narrative:     Performed at:  [facility]  [address] [place] [address] Nancie Diallo [address]   Phone [phone]   BASIC METABOLIC PANEL W/ REFLEX TO MG FOR LOW K        Remainder of labs reviewed and werenegative at this time or not returned at the time of this note. RADIOLOGY:   Non-plain film images such as CT, Ultrasound and MRI are read by the radiologist. Duyen Jones PA-C have directly visualized the radiologic plain film image(s) with the below findings:        Interpretation per the Radiologist below, if available at the time of this note:    XR LUMBAR SPINE (2-3 VIEWS)   Final Result   No acute vertebral body compression fracture or listhesis in the lumbar spine. Multilevel degenerative changes are most pronounced in the lower lumbar spine. No results found. MEDICAL DECISION MAKING / ED COURSE:      PROCEDURES:   Procedures    None    Patient was given:  Medications   ibuprofen (ADVIL;MOTRIN) tablet 800 mg (800 mg Oral Given 2/9/21 2015)   oxyCODONE-acetaminophen (PERCOCET) 5-325 MG per tablet 2 tablet (2 tablets Oral Given 2/9/21 2015)   methocarbamol (ROBAXIN) tablet 750 mg (750 mg Oral Given 2/9/21 2015)   HYDROmorphone (DILAUDID) injection 1 mg (1 mg Intramuscular Given 2/9/21 2247)   LORazepam (ATIVAN) injection 1 mg (1 mg Intravenous Given 2/9/21 2357)   fentaNYL (SUBLIMAZE) injection 100 mcg (100 mcg Intravenous Given 2/9/21 2357)   0.9 % sodium chloride bolus (1,000 mLs Intravenous New Bag 2/9/21 2357)     Emergency room course: Patient on exam cardiovascular regular rhythm, lungs are clear. No wheeze rales or rhonchi noted. No chest wall tenderness with palpation. Abdomen is soft nontender. Normal bowel sounds all 4 quadrant. Pelvic stable anterior lateral compression. Patient has no midline tender cervical thoracic spine she does have some mild lumbar tenderness both right and left side worse on the left side. She has good strength against resisted plantar dorsiflexion.   She does get pain in her back when she raise her leg on the right and left side on the right she is able to get her leg more than 45 degrees before she start having pain on the left. None the left side she can get her leg up about 10 degrees and she still having pain in the left side of her back.  strength 5+ equal bilaterally. No other motor or sensory deficit noted. Alert oriented x4. Patient lumbar x-ray shows no acute vertebral body compression fracture or listhesis in the lumbar spine. Multilevel degenerative changes are more prominent as in the lower lumbar spine. Discussed patient x-ray results with her reevaluation after she was given Percocet, Robaxin and Motrin stated it did not help with the pain at all. Every time she try to move she gets severe pain. Unable to raise her self and get out of bed. At this time I did discuss with the nurse that I will give her Dilaudid. Check her blood pressure make sure she is stable. She was given 1 mg Dilaudid IM. After 20 minutes the nurse says she stated that it did not help at all she cannot move out of bed every time she try to move his severe spasm. At this time I did asked my attending Dr. Paul Jeong to see this patient with me to see if he has any other ideas how we can manage his patient pain. He tells me that he went ahead and asked the nurse to put an IV and order some other medication for her to IV to see if we can get her under control. If this does not help we may have to bring her in for intractable back pain. Reevaluation after patient was given fentanyl and Ativan shows that she was able to move a little bit better but she was unable to stand and take a step. Increased pain left side back going down her leg. She proves to be more of a fall risk if she will be discharged. I will have hospitalist admit for back pain and fall risk. The patient tolerated their visit well. I evaluated the patient. The physician was available for consultation as needed.   The patient and / or the family were informed of the results of any tests, a time was given to answer questions, a plan was proposed and they agreed with plan. CLINICAL IMPRESSION:  1. Acute left-sided low back pain with left-sided sciatica    2. At high risk for injury related to fall        DISPOSITION  DISPOSITION Decision To Admit 02/10/2021 01:08:59 AM          PATIENT REFERRED TO:  No follow-up provider specified.     DISCHARGE MEDICATIONS:  New Prescriptions    No medications on file       DISCONTINUED MEDICATIONS:  Discontinued Medications    No medications on file              (Please note the MDM and HPI sections of this note were completed with a voice recognition program.  Efforts were made to edit the dictations but occasionally words are mis-transcribed.)    Electronically signed, Stevie Haines PA-C,          Stevie Haines PA-C  02/10/21 6913

## 2021-02-10 NOTE — TELEPHONE ENCOUNTER
Pt called to let Dr Eduar Randle know that she was admitted to [facility] last night for Sciatica. She was told to call Dr Eduar Randle to get a order to continue outpatient physical therapy for her hips. She has an appt tomorrow at 2 pm. When she got admitted that canceled out her previous order. She is being discharged today.  Please give pt call [phone]

## 2021-02-10 NOTE — ED TRIAGE NOTES
Patient arrived to ED via [facility]. Patient reports she has left lower back pain that radiates down her left leg. NKI.

## 2021-02-10 NOTE — ED NOTES
Attempted to get pt up and pt unable to walk, able to sit on side of bed, started grimacing and moaning in pain. Cecilia ALLISON aware.       Skip Ruggiero RN  02/10/21 8608

## 2021-02-10 NOTE — PLAN OF CARE
Problem: Falls - Risk of:  Goal: Will remain free from falls  Description: Fall risk assessment completed . Fall precautions in place, bed/ chair alarm on, side rails 2/4 up, call light in reach, educated pt on calling for assistance when needed, room clear of clutter. Pt verbalized understanding. Will remain free from falls  Outcome: Ongoing  Note: Fall risk assessment completed . Fall precautions in place, bed/ chair alarm on, side rails 2/4 up, call light in reach, educated pt on calling for assistance when needed, room clear of clutter. Pt verbalized understanding. Problem: Falls - Risk of:  Goal: Absence of physical injury  Outcome: Ongoing  Note: Patient remains free from physical injury. Patient educated on safety precautions. Will continue to monitor to ensure patient remains free from physical injury throughout remainder of shift. Problem: Pain:  Goal: Pain level will decrease  Description: Pain level will decrease  Outcome: Ongoing  Note: Pt educated to attempt non-phagological method of pain control, but it it becomes too strong use PRN analgesics. Pain and discomfort being managed PRN analgesics per MD orders. Pt able to express presence of pain. Problem: Pain:  Goal: Control of acute pain  Description: Control of acute pain  Outcome: Ongoing  Note: Patient educated on acute pain. Taught patient to use call light to ask for pain medication. PRN pain medication given for acute pain. Will continue to monitor pain per unit protocol.

## 2021-02-10 NOTE — PROGRESS NOTES
Occupational Therapy   Occupational Therapy Initial Assessment and Discharge    Date: 2/10/2021   Patient Name: Puja Pillai  MRN: [id number]     : 1965    Date of Service: 2/10/2021    Discharge Recommendations: Puja Pillai scored a 24/24 on the  [address] form. At this time, no further OT is recommended upon discharge due to anticipate functioning at/close to baseline. Recommend patient returns to prior setting with prior services. Home with assist PRN  OT Equipment Recommendations  Equipment Needed: No  Other: pt with needed AD    Assessment   Assessment: This is a  60 y.o. female complain of low back pain. Patient said around 5:00 this afternoon she was getting out of her vehicle and so when she stepped her leg up she got this sharp pain in the left side of her back that went down her left leg. She denies any heavy lifting no falls. Denies abdominal pain. No loss of bowel or urinary control. Denies chest pain or upper back pain. She has had sciatica on the right and feels the same on the left. She has not taken anything for the pain. She can barely move. Denies any back surgery. But she has had bilateral hip replacements this was done by Dr. Stephanie Mckenna. PTA pt from home alone where she was Ind with mobility and ADLs. Pt currently functioning at/close to baseline although with reported increased pain. Pt completes functional mobility and transfers Ind/supervision with no device. Pt limited due to reported 5/10 pain. Anticipate pt Ind with ADLs with pt reporting having needed AD. Pt reports no concerns returning home as long as pain is controlled. Pt with no ongoing OT needs. D/C from OT.   Prognosis: Good  Decision Making: Low Complexity  Exam: see above  OT Education: OT Role;Plan of Care;Transfer Training  REQUIRES OT FOLLOW UP: No  Activity Tolerance  Activity Tolerance: Patient Tolerated treatment well;Patient limited by pain  Safety Devices  Safety Devices in place: Yes  Type of devices: Call light within reach; Left in bed;Nurse notified           Patient Diagnosis(es): The primary encounter diagnosis was Acute left-sided low back pain with left-sided sciatica. A diagnosis of At high risk for injury related to fall was also pertinent to this visit. has a past medical history of Anemia, Anesthesia complication, Anxiety and depression, Chronic back pain, Condyloma acuminatum, Headache(784.0), Hyperlipidemia, Hypertension, Hypothyroidism, IBS (irritable bowel syndrome), Metrorrhagia, Neuropathy, Osteoarthritis, Urgency of urination, and Wears glasses. has a past surgical history that includes [place] tooth extraction; Total hip arthroplasty (Left, 08/16/2016); LEEP; and Total hip arthroplasty (Right, 11/16/2020). Restrictions  Restrictions/Precautions  Restrictions/Precautions: Fall Risk  Position Activity Restriction  Other position/activity restrictions: Hx of back pain    Subjective   General  Chart Reviewed: Yes  Patient assessed for rehabilitation services?: Yes  Additional Pertinent Hx: per ED note, This is a  60 y.o. female complain of low back pain. Patient said around 5:00 this afternoon she was getting out of her vehicle and so when she stepped her leg up she got this sharp pain in the left side of her back that went down her left leg. She denies any heavy lifting no falls. Denies abdominal pain. No loss of bowel or urinary control. Denies chest pain or upper back pain. She has had sciatica on the right and feels the same on the left. She has not taken anything for the pain. She can barely move. Denies any back surgery. But she has had bilateral hip replacements this was done by Dr. Sophy Parham. Family / Caregiver Present: No  Referring Practitioner: Chhaya Landers MD  Subjective  Subjective: Pt agreeable to OT evaluation. Pt reports increasing back pain with pain in back rated 5/10. RN made aware of request for pain medicine.   General Comment  Comments: Status: WFL        LUE AROM (degrees)  LUE AROM : WFL  RUE AROM (degrees)  RUE AROM : WFL  LUE Strength  Gross LUE Strength: WFL  RUE Strength  Gross RUE Strength: WFL                   Plan   Plan  Times per week: D/C from OT      AM-PAC Score        AM-PAC Inpatient Daily Activity Raw Score: 24 (02/10/21 1524)  AM-PAC Inpatient ADL T-Scale Score : 57.54 (02/10/21 1524)  ADL Inpatient CMS 0-100% Score: 0 (02/10/21 1524)  ADL Inpatient CMS G-Code Modifier : [address] (02/10/21 1524)    Goals  Short term goals  Time Frame for Short term goals: no ongoing OT goals  Short term goal 1: complete ADLs Ind- goal met 2/10  Patient Goals   Patient goals : decrease pain       Therapy Time   Individual Concurrent Group Co-treatment   Time In 1457         Time Out 1521         Minutes 24         Timed Code Treatment Minutes: 9 Minutes(15 minute eval)       RACHEL Solano/L

## 2021-02-10 NOTE — CONSULTS
Patient seen and examined. See fully dictated consult note. [id number]  History of exacerbations of radiculopathy and back pain, usually left side, remote history of right sided pain. She is currently out of pain with inpatient medication management. MRI performed 12/2020 reviewed by me. She has retrolisthesis of L5S1, foraminal stenosis at L45, l5S1, disc degeneration at multiple levels and no significant canal stenosis or elie disc herniation. Surgical decompression and stabilization is a last resort if she fails conservative therapy. Recommend outpatient lumbar epidural steriod injection therapy with dr Guera Brown. No surgery recommended at this time.

## 2021-02-10 NOTE — ED PROVIDER NOTES
[address] [facility]      Pt Name: Jayson Christianson  MRN: [id number]  [place] 1965  Date of evaluation: 2/9/2021  Provider: Cecil Mcfadden MD    CHIEF COMPLAINT       Chief Complaint   Patient presents with    Back Pain     NKI       HISTORY OF PRESENT ILLNESS    Jayson Christianson is a 60 y.o. female who presents to the emergency department with back pain. Patient endorses left-sided back pain radiating down the left leg. Started spontaneously today. No trauma. Pain is acute 9/10 sharp and constant in nature. Has been taking OTC medications with minimal relief. Nothing makes symptoms better but movement makes symptoms worse. This has never happened before. No other associated symptoms other than previously mentioned. Patient denies weakness, saddle numbness, loss of bowel or bladder function. Nursing Notes were reviewed. Including nursing noted for FM, Surgical History, Past Medical History, Social History, vitals, and allergies; agree with all. REVIEW OF SYSTEMS       Review of Systems   Constitutional: Negative for diaphoresis and unexpected weight change. HENT: Negative for congestion and dental problem. Eyes: Negative for discharge and itching. Respiratory: Negative for cough and shortness of breath. Cardiovascular: Negative for chest pain and leg swelling. Gastrointestinal: Negative for abdominal pain, constipation and vomiting. Endocrine: Negative for cold intolerance and heat intolerance. Genitourinary: Negative for vaginal bleeding, vaginal discharge and vaginal pain. Musculoskeletal: Positive for back pain. Negative for neck pain and neck stiffness. Skin: Negative for color change and pallor. Neurological: Negative for tremors and weakness. Psychiatric/Behavioral: Negative for agitation and behavioral problems. Except as noted above the remainder of the review of systems was reviewed and negative.      PAST MEDICAL HISTORY     Past Medical History:   Diagnosis Date    Anemia     Anesthesia complication     severe headache after woke up after wisdom teeth    Anxiety and depression     Chronic back pain     Condyloma acuminatum     Headache(784.0)     Hyperlipidemia     Hypertension     Hypothyroidism     IBS (irritable bowel syndrome)     Metrorrhagia     Neuropathy     Osteoarthritis     Urgency of urination     Wears glasses     driving at night/reading       SURGICAL HISTORY       Past Surgical History:   Procedure Laterality Date    LEEP      TOTAL HIP ARTHROPLASTY Left 08/16/2016    TOTAL HIP ARTHROPLASTY Right 11/16/2020    RIGHT LATERAL TOTAL HIP REPLACEMENT performed by Gaby Orr MD at [facility]       Previous Medications    APIXABAN (ELIQUIS) 2.5 MG TABS TABLET    Take 1 tablet by mouth 2 times daily Begin day after discharge from hospital and continue for 30 total days    BIOTIN 45764 MCG TABS    Take by mouth    FOLIC ACID (FOLVITE) 271 MCG TABLET    Take 400 mcg by mouth daily    HYDROXYCHLOROQUINE (PLAQUENIL) 200 MG TABLET    TAKE ONE TABLET BY MOUTH TWICE A DAY    LEVOTHYROXINE (SYNTHROID) 300 MCG TABLET    TAKE ONE TABLET BY MOUTH DAILY    MELOXICAM (MOBIC) 15 MG TABLET    HOLD for 14 days after hip surgery    OXYBUTYNIN (DITROPAN-XL) 10 MG EXTENDED RELEASE TABLET    TAKE ONE TABLET BY MOUTH DAILY    SIMVASTATIN (ZOCOR) 20 MG TABLET    TAKE ONE TABLET BY MOUTH ONCE NIGHTLY    TRIAMTERENE-HYDROCHLOROTHIAZIDE (DYAZIDE) 37.5-25 MG PER CAPSULE    TAKE ONE CAPSULE BY MOUTH DAILY    VITAMIN B-12 (CYANOCOBALAMIN) 1000 MCG TABLET    Take 1,000 mcg by mouth daily    VITAMIN D (CHOLECALCIFEROL) 1000 UNIT TABS TABLET    Take 1,000 Units by mouth daily       ALLERGIES     Glucosamine chondroitin complx [glucosamine-chondroitin], Sulfa antibiotics, and Erythromycin    FAMILY HISTORY        Family History   Problem Relation Age of Onset    °C) Oral 79 18 96 % 5' 7\" (1.702 m) --       Physical Exam  Vitals signs and nursing note reviewed. Constitutional:       General: She is not in acute distress. Appearance: She is well-developed. She is not ill-appearing, toxic-appearing or diaphoretic. HENT:      Head: Normocephalic and atraumatic. Right Ear: External ear normal.      Left Ear: External ear normal.   Eyes:      General:         Right eye: No discharge. Left eye: No discharge. Conjunctiva/sclera: Conjunctivae normal.      Pupils: Pupils are equal, round, and reactive to light. Neck:      Musculoskeletal: Normal range of motion and neck supple. Cardiovascular:      Rate and Rhythm: Normal rate and regular rhythm. Heart sounds: No murmur. Pulmonary:      Effort: Pulmonary effort is normal. No respiratory distress. Breath sounds: Normal breath sounds. No wheezing or rales. Abdominal:      General: Bowel sounds are normal. There is no distension. Palpations: Abdomen is soft. There is no mass. Tenderness: There is no abdominal tenderness. There is no guarding or rebound. Genitourinary:     Comments: Deferred  Musculoskeletal: Normal range of motion. General: Tenderness present. No deformity. Skin:     General: Skin is warm. Findings: No erythema or rash. Neurological:      Mental Status: She is alert and oriented to person, place, and time. She is not disoriented. Cranial Nerves: No cranial nerve deficit. Motor: No atrophy or abnormal muscle tone. Coordination: Coordination normal.      Comments: 5/5 strength bilaterally    Psychiatric:         Behavior: Behavior normal.         Thought Content:  Thought content normal.         DIAGNOSTIC RESULTS     EKG: All EKG's are interpreted by the Emergency Department Physician who either signs or Co-signs this chart in the absence of acardiologist.    None    RADIOLOGY:   Non-plain film images such as CT, Ultrasoundand MRI are read by the radiologist. Sophy Duffy radiographic images are visualized and preliminarily interpreted by the emergency physician with the below findings:       No acute vertebral body compression fracture or listhesis in the lumbar spine.       Multilevel degenerative changes are most pronounced in the lower lumbar spine. ED BEDSIDE ULTRASOUND:   Performed by ED Physician - none    LABS:  Labs Reviewed   CBC WITH AUTO DIFFERENTIAL - Abnormal; Notable for the following components:       Result Value    WBC 12.1 (*)     RDW 16.6 (*)     MPV 10.7 (*)     All other components within normal limits    Narrative:     Performed at:  [address] [facility]   Phone [phone]   BASIC METABOLIC PANEL W/ REFLEX TO MG FOR LOW K       All other labs were withinnormal range or not returned as of this dictation. EMERGENCY DEPARTMENT COURSE and DIFFERENTIAL DIAGNOSIS/MDM:     PMH, Surgical Hx, FH, Social Hx reviewed by myself (ETOH usage, Tobacco usage, Drug usage reviewed by myself, no pertinent Hx)- No Pertinent Hx     Old records were reviewed by me    20-year-old female with intractable back pain. No cord compression symptoms. Pain is not well controlled admission for further inpatient evaluation. CRITICAL CARE TIME   Total Critical Caretime was 21 minutes, excluding separately reportable procedures. There was a high probability of clinically significant/life threatening deterioration in the patient's condition which required my urgent intervention. PROCEDURES:  Unlessotherwise noted below, none    FINAL IMPRESSION      1. Acute left-sided low back pain with left-sided sciatica    2. At high risk for injury related to fall          DISPOSITION/PLAN   DISPOSITION Decision To Admit 02/10/2021 01:08:59 AM    PATIENT REFERRED TO:  No follow-up provider specified.     DISCHARGE MEDICATIONS:  New Prescriptions    No medications on file          (Please

## 2021-02-10 NOTE — CARE COORDINATION
INITIAL CASE MANAGEMENT ASSESSMENT    Reviewed chart, met with patient to assess possible discharge needs. Explained Case Management role/services. Living Situation: lives at home alone    ADLs: independent     DME: 2WW    PT/OT Recs: not ordered at present     Active Services: none     Transportation: active      Medications: no issues--uses [employer]    PCP: Dr Jun Cook      HD/PD: n/a    PLAN/COMMENTS: patient plans to return home at dc--unsure of dc needs at present    SW/CM provided contact information for patient or family to call with any questions. SW/CM will follow and assist as needed.   Electronically signed by Melanie Johnston on 2/10/2021 at 10:50 #[phone]

## 2021-02-10 NOTE — CONSULTS
[address]                                  CONSULTATION    PATIENT NAME: Lisa Hughes                       :        1965  MED REC NO:   [id number]                          ROOM:       3108  ACCOUNT NO:   [de-identified]                           ADMIT DATE: 2021  PROVIDER:     Breanne Betancur MD    CONSULT DATE:  02/10/2021    REASON FOR CONSULTATION:  Lumbar radiculopathy. HISTORY OF PRESENT ILLNESS:  The patient is a 64-year-old, presented to  the emergency department with back pain. She had left-sided back pain  radiating down the leg. It started spontaneously. She has history of  exacerbations of radicular pain usually on the left side but a remote  history of right-sided pain. She was admitted for pain control. She  had a MRI in end of December to have her episode of radicular pain  evaluated at that time. She is currently not in pain after medication  management after admission. MRI is available for my review. PAST MEDICAL HISTORY:  Anemia, anesthesia complication, anxiety,  depression, chronic back pain, headache, hypertension, hypothyroidism,  IBS, menorrhagia. PAST SURGICAL HISTORY:  Left total hip, right wisdom tooth extraction. MEDICATIONS:  Eliquis, folate, biotin, Plaquenil, Synthroid, Mobic,  Ditropan, Zocor, Dyazide, vitamin D. ALLERGIES:  GLUCOSAMINE, SULFA, ERYTHROMYCIN. FAMILY HISTORY:  Heart disease in mother. SOCIAL HISTORY:  . Smokes daily. PHYSICAL EXAMINATION:  GENERAL:  Awake, alert, not in distress. HEENT:  Atraumatic, normocephalic. Negative. COR:  Regular rate and rhythm. CHEST:  Nonlabored breathing. ABDOMEN:  Soft, nondistended. NEUROLOGIC:  Strength 5/5. Cranial nerves II through XII intact. MRI performed 2020 reviewed by me.   She has retrolisthesis of  L5-S1, foraminal stenosis at L4-5, L5-S1, disk degeneration at multiple  levels and no significant canal stenosis or elie disk herniation. IMPRESSION:  Radiculopathy, degenerative disk disease. RECOMMENDATIONS:  Surgical decompression and stabilization as the last  resort if she fails conservative therapy. Recommend outpatient lumbar  epidural steroid injection therapy with Dr. Eunice Forrest. No surgeries  recommended at this time. I answered her questions and she was in  agreement.         Tina Astorga MD    D: 02/10/2021 13:35:28       T: 02/10/2021 13:42:17     [email or web address]  Job#: [id number]     Doc#: [id number]    CC:

## 2021-02-10 NOTE — ED NOTES
While in room next door, could hear pt stating \"I can't breath. \" This nurse went to room and found pt sitting up on end of bed, hyperventilating and tearful. Pt assisted back into bed and placed on pulse ox and instructed to slow breathing. Bebo Cheng notified and at bedside.       Wicho Reyes RN  02/09/21 7618

## 2021-02-10 NOTE — H&P
Hospitalist  History and Physical    Patient:  Samantha Bianchi  MRN: [id number]  PCP: Maybelle Meckel, DO    CHIEF COMPLAINT: Back Pain      HISTORY OF PRESENT ILLNESS:   The patient Samantha Bianchi is a 60 y. o.female 60years old female with medical history significant for anemia anxiety depression chronic low back pain headache hypertension hypothyroidism irritable bowel syndrome and menorrhagia. Who presented to the emergency room with back pain. Patient reports pain is more on the left side and radiates to the left lower extremity. Patient denies any significant injury and the pain started on its own. Patient has had similar problem in December last year when MRI was done, which showed retrolisthesis on L5-S1 foraminal stenosis at L4-L5 L5-S1 disc degeneration at multiple levels. No significant canal stenosis was noted and there was no elie disc herniation  Past Medical History:        Diagnosis Date    Anemia     Anesthesia complication     severe headache after woke up after wisdom teeth    Anxiety and depression     Chronic back pain     Condyloma acuminatum     Headache(784.0)     Hyperlipidemia     Hypertension     Hypothyroidism     IBS (irritable bowel syndrome)     Metrorrhagia     Neuropathy     Osteoarthritis     Urgency of urination     Wears glasses     driving at night/reading       Past Surgical History:        Procedure Laterality Date    LEEP      TOTAL HIP ARTHROPLASTY Left 08/16/2016    TOTAL HIP ARTHROPLASTY Right 11/16/2020    RIGHT LATERAL TOTAL HIP REPLACEMENT performed by Aaliyah Asher MD at [address]         Medications Prior to Admission:    Prior to Admission medications    Medication Sig Start Date End Date Taking?  Authorizing Provider   levothyroxine (SYNTHROID) 300 MCG tablet TAKE ONE TABLET BY MOUTH DAILY 1/28/21  Yes Paresh Stevens DO   simvastatin (ZOCOR) 20 MG tablet TAKE ONE TABLET BY MOUTH ONCE NIGHTLY 1/28/21  Yes Paresh Rodney,    meloxicam (MOBIC) 15 MG tablet HOLD for 14 days after hip surgery 11/16/20  Yes ZACH Montero CNP   hydroxychloroquine (PLAQUENIL) 200 MG tablet TAKE ONE TABLET BY MOUTH TWICE A DAY 10/5/20  Yes Janice Galvez MD   triamterene-hydroCHLOROthiazide (DYAZIDE) 37.5-25 MG per capsule TAKE ONE CAPSULE BY MOUTH DAILY 7/6/20  Yes Paresh Stevens DO   oxybutynin (DITROPAN-XL) 10 MG extended release tablet TAKE ONE TABLET BY MOUTH DAILY 7/6/20  Yes Paresh Stevens DO   folic acid (FOLVITE) 275 MCG tablet Take 400 mcg by mouth daily   Yes Historical Provider, MD   vitamin D (CHOLECALCIFEROL) 1000 UNIT TABS tablet Take 1,000 Units by mouth daily   Yes Historical Provider, MD   vitamin B-12 (CYANOCOBALAMIN) 1000 MCG tablet Take 1,000 mcg by mouth daily   Yes Historical Provider, MD   Biotin 89737 MCG TABS Take by mouth   Yes Historical Provider, MD       Allergies:  Glucosamine chondroitin complx [glucosamine-chondroitin], Sulfa antibiotics, and Erythromycin      Social History:   TOBACCO:   reports that she has been smoking cigarettes. She has a 30.00 pack-year smoking history. She has never used smokeless tobacco.  ETOH:   reports current alcohol use. Family History:      Problem Relation Age of Onset    Diabetes Other     High Blood Pressure Other     Stroke Other     Cancer Other         Bone    Alcohol Abuse Other     Heart Disease Mother         cardiac arrhythmia    High Blood Pressure Mother     Thyroid Disease Mother     Arthritis Father     Heart Disease Father         cabg    Diabetes Father     Stroke Father     Cancer Sister         breast cancer    Cancer Brother         lung           REVIEW OF SYSTEMS:     patients reported symptoms are in BOLD all other symptoms are negative.     CONSTITUTIONAL:      fatigue, fever, chills or night sweats, recent weight gain, recent wt loss, insomnia,  General weakness, poor appetite, muscle aches and pains    HEAD: headache, dizziness    EYES:      blurriness,  double vision, dryness,  discharge, irritation,diplopia    EARS:      hearing loss, vertigo, ear discharge,  Earache. Ringing in the ears. NOSE:      Rhinorrhea, sneezing, epistaxis. Discharge, sinusitis,     MOUTH/THROAT:         sore throat, mouth ulcers, Hoarseness    RESPIRATORY:        Shortness of breath, wheezing,  cough, sputum, hemoptysis, obstructive sleep apnea,    CARDIOVASCULAR :      chest pain, palpitations, dyspnea on exercise, Lower extrimity edema (swelling),     GASTROINTESTINAL:       Dysphagia, Poor appetite,  Nausea, Vomiting, diarrhea, heartburn, abdominal pain. Blood in the stools, hematemesis. Pain with swallowing, constipation    GENITOURINARY:       Urinary frequency, hesitancy,  urgency, Dysuria, hematuria,  Urinary Incontinence. Urinary Retention. GYNECOLOGICAL: vaginal bleeding , vaginal discharge, menopause    MUSCULOSKELETAL: Pain radiating to the left lower extremity    joint swelling or stiffness, joint pain, muscle pain, balance problems, low back pain. NEUROLOGICAL:      Gait problems. Tremor. Dizziness. Pain and paresthesias, weakness in extremities. Seizures, memory loss    PSYCHLOGICAL:        Anxiety, depression    SKIN :      Rashes ulcers, skin color changes, easy bruisability, lymphadenopathy      Physical Exam:      Vitals: BP (!) 224/98   Pulse 102   Temp 97.8 °F (36.6 °C) (Axillary)   Resp 22   Ht 5' 7\" (1.702 m)   Wt 284 lb 13.4 oz (129.2 kg)   LMP 01/27/2015   SpO2 91%   BMI 44.61 kg/m²     Gen:          Alert and oriented x 3  Eyes: PERRL. No sclera icterus. No conjunctival injection. ENT: No discharge. Pharynx clear. External appearance of ears and nose normal.  Neck: Trachea midline. No obvious mass. Resp: No accessory muscle use. No crackles. No wheezes. No rhonchi. CV: Regular rate. Regular rhythm. No murmur or rub. No edema. GI: Non-tender. Non-distended. No hernia.    Skin: Warm, dry, normal texture and turgor. Lymph: No cervical LAD. No supraclavicular LAD. M/S: / Ext. No cyanosis. No clubbing. No joint deformity. Neuro: Moves all four extremities. CN 2-12 tested, no deficits noted. Peripheral pulses and capillary refill is intact. CBC:   Recent Labs     02/10/21  0102   WBC 12.1*   HGB 15.1        BMP:    Recent Labs     02/10/21  0102   *   K 4.2   CL 97*   CO2 25   BUN 17   CREATININE 0.9   GLUCOSE 104*     Hepatic: No results for input(s): AST, ALT, ALB, BILITOT, ALKPHOS in the last 72 hours. Troponin: No results for input(s): TROPONINI in the last 72 hours. BNP: No results for input(s): BNP in the last 72 hours. INR: No results for input(s): INR in the last 72 hours. Lab Results   Component Value Date    LABA1C 6.5 11/17/2020           No results for input(s): CKTOTAL in the last 72 hours. -----------------------------------------------------------------  XR LUMBAR SPINE (2-3 VIEWS)  No acute vertebral body compression fracture or listhesis in the lumbar spine. Assessment / Plan     Radiculopathy  Likely L5-S1 nerve root on the left side  Patient symptoms are better  Patient is evaluated by neurology and recommended to follow-up as an outpatient  For epidural steroid injections with Dr. Marlon Mcnally with the IV and oral pain medication    Tobacco abuse Z72.0  Nicotine patch and counseling            DVT and GI prophylaxis      Full Cecil Winkler M.D    This note was transcribed using [id number] [employer]. Please disregard any translational errors.

## 2021-02-11 ENCOUNTER — HOSPITAL ENCOUNTER (OUTPATIENT)
Dept: PHYSICAL THERAPY | Age: 56
Setting detail: THERAPIES SERIES
Discharge: HOME | End: 2021-02-11
Payer: MEDICARE

## 2021-02-11 VITALS
SYSTOLIC BLOOD PRESSURE: 146 MMHG | TEMPERATURE: 98.1 F | OXYGEN SATURATION: 92 % | HEART RATE: 80 BPM | WEIGHT: 284.83 LBS | RESPIRATION RATE: 20 BRPM | HEIGHT: 67 IN | BODY MASS INDEX: 44.71 KG/M2 | DIASTOLIC BLOOD PRESSURE: 85 MMHG

## 2021-02-11 PROCEDURE — 94760 N-INVAS EAR/PLS OXIMETRY 1: CPT

## 2021-02-11 PROCEDURE — 2580000003 HC RX 258: Performed by: STUDENT IN AN ORGANIZED HEALTH CARE EDUCATION/TRAINING PROGRAM

## 2021-02-11 PROCEDURE — G0378 HOSPITAL OBSERVATION PER HR: HCPCS

## 2021-02-11 PROCEDURE — 6370000000 HC RX 637 (ALT 250 FOR IP): Performed by: HOSPITALIST

## 2021-02-11 PROCEDURE — 6370000000 HC RX 637 (ALT 250 FOR IP): Performed by: STUDENT IN AN ORGANIZED HEALTH CARE EDUCATION/TRAINING PROGRAM

## 2021-02-11 PROCEDURE — 6360000002 HC RX W HCPCS: Performed by: STUDENT IN AN ORGANIZED HEALTH CARE EDUCATION/TRAINING PROGRAM

## 2021-02-11 RX ORDER — OXYCODONE HYDROCHLORIDE AND ACETAMINOPHEN 5; 325 MG/1; MG/1
1 TABLET ORAL EVERY 4 HOURS PRN
Qty: 28 TABLET | Refills: 0 | Status: SHIPPED | OUTPATIENT
Start: 2021-02-11 | End: 2021-02-18

## 2021-02-11 RX ORDER — LIDOCAINE 4 G/G
1 PATCH TOPICAL DAILY
Qty: 1 BOX | Refills: 1 | Status: SHIPPED | OUTPATIENT
Start: 2021-02-12 | End: 2021-03-25

## 2021-02-11 RX ADMIN — SODIUM CHLORIDE, PRESERVATIVE FREE 10 ML: 5 INJECTION INTRAVENOUS at 08:20

## 2021-02-11 RX ADMIN — LISINOPRIL 10 MG: 10 TABLET ORAL at 08:17

## 2021-02-11 RX ADMIN — ENOXAPARIN SODIUM 40 MG: 40 INJECTION SUBCUTANEOUS at 08:18

## 2021-02-11 RX ADMIN — LEVOTHYROXINE SODIUM 300 MCG: 0.1 TABLET ORAL at 05:50

## 2021-02-11 ASSESSMENT — PAIN DESCRIPTION - ONSET: ONSET: GRADUAL

## 2021-02-11 ASSESSMENT — PAIN - FUNCTIONAL ASSESSMENT: PAIN_FUNCTIONAL_ASSESSMENT: PREVENTS OR INTERFERES SOME ACTIVE ACTIVITIES AND ADLS

## 2021-02-11 ASSESSMENT — PAIN DESCRIPTION - FREQUENCY: FREQUENCY: CONTINUOUS

## 2021-02-11 ASSESSMENT — PAIN DESCRIPTION - LOCATION: LOCATION: BACK;LEG

## 2021-02-11 ASSESSMENT — PAIN DESCRIPTION - DESCRIPTORS: DESCRIPTORS: SHOOTING;RADIATING

## 2021-02-11 ASSESSMENT — PAIN SCALES - GENERAL: PAINLEVEL_OUTOF10: 1

## 2021-02-11 ASSESSMENT — PAIN DESCRIPTION - ORIENTATION: ORIENTATION: LEFT

## 2021-02-11 NOTE — PROGRESS NOTES
Patient is alert & oriented x4, UAL, 2/4 bed rails up, bed in lowest position, fall precautions in place, call light within reach. Morning assessment complete. Morning medications given. Pt resting in bed. Perfect serve message sent to Dr. Elo Tenorio about discharge today as well as prescriptions to be sent home with pt. Will cont to monitor and reassess.   Electronically signed by Cayla Wild RN on 2/11/2021 at 8:33 AM

## 2021-02-11 NOTE — FLOWSHEET NOTE
Physical Therapy  Cancellation/No-show Note  Patient Name:  Shreya Gutierrez  :  1965   Date:  2021  Cancelled visits to date: 4  No-shows to date:     For today's appointment patient:  [x]  Cancelled  []  Rescheduled appointment  []  No-show     Reason given by patient:  [x]  Patient ill  []  Conflicting appointment  []  No transportation    []  Conflict with work  []  No reason given  []  Other:     Comments:      Electronically signed by:  Griselda Rough, PT

## 2021-02-11 NOTE — PROGRESS NOTES
Data- discharge order received, pt verbalized agreement to discharge, disposition to previous residence, no needs for HHC/DME. Action- discharge instructions prepared and given to pt, pt verbalized understanding. Medication information packet given r/t NEW and/or CHANGED prescriptions emphasizing name/purpose/side effects, pt verbalized understanding. Discharge instruction summary: Diet- as tolerated, Activity- as tolerated, Primary Care Physician as followsDebra DO Gregg [phone] f/u appointment for steroid injection pt told to make appointment number provided, immunizations reviewed, prescription medications given to pt. Response- Pt belongings gathered, IV removed. Disposition is home (no HHC/DME needs), daughter to transport home. Pt will call out once daughter is here. Will be transported to [facility] in a wheelchair.    Electronically signed by Joi Licona RN on 2/11/2021 at 11:21 AM

## 2021-02-11 NOTE — DISCHARGE SUMMARY
Jacob Wilson MD   2/10/2021      Thank you Eleanor Cook DO for the opportunity to be involved in this patient's care. If you have any questions or concerns please feel free to contact me at [phone]. This note was transcribed using [id number] [employer]. Please disregard any translational errors.

## 2021-02-11 NOTE — PLAN OF CARE
Problem: Falls - Risk of:  Goal: Will remain free from falls  Description: Fall risk assessment completed . Fall precautions in place, bed/ chair alarm on, side rails 2/4 up, call light in reach, educated pt on calling for assistance when needed, room clear of clutter. Pt verbalized understanding. Will remain free from falls  2/11/2021 0725 by Reid Agudelo RN  Outcome: Ongoing  Note: Fall risk assessment completed. Fall precautions in place. Bed in lowest position, wheels locked, bed/chair exit alarm in place, call light within reach, and non skid footwear on. Walkway free of clutter. Pt alert and oriented and able to make needs known. Pt educated to use call light when needing to get up, and pt utilizes call light to make needs known. Will continue to monitor. Problem: Falls - Risk of:  Goal: Absence of physical injury  2/11/2021 0725 by Reid Agudelo RN  Outcome: Ongoing  Note: Pt absent from physical injury on this shift      Problem: Pain:  Goal: Pain level will decrease  Description: Pain level will decrease  2/11/2021 0725 by Reid Agudelo RN  Outcome: Ongoing  Note: Assessing and monitoring pt's pain. PRN pain medication being given if ordered. Educating pt on nonpharmacologic interventions for pain control. Will continue to monitor and reassess. Problem: Pain:  Goal: Control of acute pain  Description: Control of acute pain  2/11/2021 0725 by Reid Agudelo RN  Outcome: Ongoing  Note: Assessing and monitoring pt's pain. PRN pain medication being given if ordered. Educating pt on nonpharmacologic interventions for pain control. Will continue to monitor and reassess.

## 2021-02-11 NOTE — PROGRESS NOTES
Atherosclerosis. Normal alignment of the bilateral sacroiliac joints.  Partially visualized   hip arthroplasty hardware.       Impression:       No acute vertebral body compression fracture or listhesis in the lumbar spine. Multilevel degenerative changes are most pronounced in the lower lumbar spine. MRI performed 12/20/2020 reviewed by me. She has retrolisthesis of  L5-S1, foraminal stenosis at L4-5, L5-S1, disk degeneration at multiple  levels and no significant canal stenosis or elie disk herniation.     IMPRESSION:  Radiculopathy, degenerative disk disease. MY REVIEW:  60  female  BACK PAIN: Patient was diagnosed with:  L5-S1 radiculopathy  IV pain medication  Acute on chronic low back pain  Degenerative disc disease of lumbar spine     Prior HIP replacements Dr Maggie Mason    The Utilization Review Committee members, including its physician members, have reviewed this case and agree that this patient does NOT YET meet evidence based criteria for inpatient services, Medical care will continue to be provided by Lesli Bland MD, who concurs with this decision and has documented his/her concurrence in the patient's medical record. Creighton B. Sharan Felty, MD, [facility] [address], [address], [address]    Cell [phone]  Office [phone]  2/11/2021  9:29 AM

## 2021-02-11 NOTE — CARE COORDINATION
Tanya aware from RN--patient is observation status and will need MARTINEZ letter. Sw spoke with patient regarding above and provided her with MARTINEZ letter--she declined to sign. Copy of timed and dated letter placed in chart.   Electronically signed by JEROD Valle, LSW, Case Management on 2/11/2021 at 10:35 AM  [facility] [phone]

## 2021-02-12 ENCOUNTER — TELEPHONE (OUTPATIENT)
Dept: INTERNAL MEDICINE CLINIC | Age: 56
End: 2021-02-12

## 2021-02-12 ENCOUNTER — TELEPHONE (OUTPATIENT)
Dept: FAMILY MEDICINE CLINIC | Age: 56
End: 2021-02-12

## 2021-02-12 NOTE — DISCHARGE SUMMARY
Hospital Medicine Discharge Summary      Patient ID: Nano Ponce , [id number]     Patient's PCP: Zenon Cortez DO    Admit Date: 2/9/2021     Discharge Date: 2/11/2021      Admitting Physician: Lesli Bland MD    Discharge Physician: Antwan Harris MD     Discharge Diagnoses: Active Hospital Problems    Diagnosis Date Noted    Intractable back pain [M54.9] 11/09/2009         The patient was seen and examined on the day of discharge and this discharge summary is in conjunction with any daily progress note from day of discharge. HOSPITAL COURSE    Patient demographics:  The patient  Nano Ponce is a 60 y.o. female      Significant past medical history:       Patient Active Problem List   Diagnosis    Sebas's deformity of right heel    Primary osteoarthritis of both knees    Arthritis of left hip    Class 2 severe obesity due to excess calories with serious comorbidity and body mass index (BMI) of 37.0 to 37.9 [facility])    Osteoarthritis of multiple joints    Migraine without aura    Metrorrhagia    Mammographic microcalcification    Intractable back pain    Hypothyroidism    Conjunctivitis    Condyloma acuminatum    Abnormal mammogram    Skin ulcer, limited to breakdown of skin ([address]    Arthritis of right hip    History of total hip replacement, right            Presenting symptoms:  Back Pain     Diagnostic workup:   XR LUMBAR SPINE (2-3 VIEWS)         CONSULTS DURING ADMISSION :   IP CONSULT TO NEUROSURGERY  IP CONSULT TO PAIN MANAGEMENT        Patient was diagnosed with:  L5-S1 radiculopathy  Acute on chronic low back pain  Degenerative disc disease of lumbar spine        Treatment while inpatient:  Patient presented with lower back pain that radiates to the left lower extremity.   Patient was admitted for pain control. Sandhya Guzmán was evaluated by neurosurgery and the recommendation at this point is conservative treatment with epidural steroid injections. Patient's pain was controlled with pain medication while she was in the hospital.  Patient is recommended to follow-up as an outpatient for epidural steroid injections.           Discharge Condition:  stable      Discharged to:  Home      Activity:   as tolerated:     Follow Up: Follow-up with PCP in 1-2 weeks            Labs: For convenience and continuity at follow-up the following most recent labs are provided:      CBC:   Lab Results   Component Value Date    WBC 12.1 02/10/2021    HGB 15.1 02/10/2021    HCT 46.3 02/10/2021     02/10/2021       RENAL:   Lab Results   Component Value Date     02/10/2021    K 4.2 02/10/2021    CL 97 02/10/2021    CO2 25 02/10/2021    BUN 17 02/10/2021    CREATININE 0.9 02/10/2021           Discharge Medications:    Mateo Mancilla   Home Medication Instructions [id number]    Printed on:02/12/21 1600   Medication Information                      Biotin 53897 MCG TABS  Take by mouth             folic acid (FOLVITE) 005 MCG tablet  Take 400 mcg by mouth daily             hydroxychloroquine (PLAQUENIL) 200 MG tablet  TAKE ONE TABLET BY MOUTH TWICE A DAY             levothyroxine (SYNTHROID) 300 MCG tablet  TAKE ONE TABLET BY MOUTH DAILY             lidocaine 4 % external patch  Place 1 patch onto the skin daily             meloxicam (MOBIC) 15 MG tablet  HOLD for 14 days after hip surgery             oxybutynin (DITROPAN-XL) 10 MG extended release tablet  TAKE ONE TABLET BY MOUTH DAILY             oxyCODONE-acetaminophen (PERCOCET) 5-325 MG per tablet  Take 1 tablet by mouth every 4 hours as needed for Pain for up to 7 days.              simvastatin (ZOCOR) 20 MG tablet  TAKE ONE TABLET BY MOUTH ONCE NIGHTLY             triamterene-hydroCHLOROthiazide (DYAZIDE) 37.5-25 MG per capsule  TAKE ONE CAPSULE BY MOUTH DAILY             vitamin B-12 (CYANOCOBALAMIN) 1000 MCG tablet  Take 1,000 mcg by mouth daily             vitamin D (CHOLECALCIFEROL) 1000 UNIT TABS tablet  Take 1,000 Units by mouth daily                    Time Spent on discharge is more than 30 min in the examination, evaluation, counseling and review of medications and discharge plan. Signed:  Soo Mir MD   2/12/2021      Thank you Alicja Almanzar DO for the opportunity to be involved in this patient's care. If you have any questions or concerns please feel free to contact me at [phone]. This note was transcribed using [id number] [employer]. Please disregard any translational errors.

## 2021-02-12 NOTE — TELEPHONE ENCOUNTER
Please call the patient and let her know that it is okay to see Dr. Alan Vo also for epidural injections.   She should get her eyes examined by ophthalmologist, she can resume Plaquenil if it is okay with the ophthalmologist and if there is no evidence of Plaquenil toxicity on the eye exam.  I am not sure why she was taken off of meloxicam.  If she does not have any history of kidney disease or stomach ulcers she can go back on meloxicam

## 2021-02-12 NOTE — TELEPHONE ENCOUNTER
[facility] Initial Follow Up Call    Outreach made within 2 business days of discharge: Yes    Patient: Shreya Gutierrez Patient : 1965   MRN: [id number]  Reason for Admission: There are no discharge diagnoses documented for the most recent discharge. Discharge Date: 21       Spoke with: patient in contact with PCP office with questions and concerns.   Has HFU appt with rheumatology     Discharge department/facility: [facility]    Scheduled appointment with PCP within 7-14 days    Follow Up  Future Appointments   Date Time Provider Rima Lopez   2021  3:00 PM Wanda Libman, MD [facility]   2021  1:00 PM ESTEFANY Massey [facility]       Alessandro Billet, [address]

## 2021-02-12 NOTE — TELEPHONE ENCOUNTER
Spoke with pt. She reports that the hospital took her off the mobic because of swelling.  She will be seeing Yayo Morales and will discuss further at her appt

## 2021-02-12 NOTE — TELEPHONE ENCOUNTER
Patient states she was released yesterday 2/11/21 from [facility].  She states they referred her to Dr Hafsa Grimes for lumbar epidural steroid injections. She asked if Dr Karen Max okay with her getting those and asked if Dr Karen Max wants to speak to Dr Eunice Forrest. Patient has an appointment with Dr Karen Max 2/25/21. Patient states she stopped hydroxychloroquine 3 weeks ago due to she had a fuzziness in her eye. She states the hospital was giving her plaquenil and she did not realize that is the same medication. She asked if she should continue taking it.   Patient also states the hospital advised her to stop taking meloxicam.

## 2021-02-14 RX ORDER — LEVOTHYROXINE SODIUM 300 UG/1
TABLET ORAL
Qty: 90 TABLET | Refills: 1 | Status: SHIPPED | OUTPATIENT
Start: 2021-02-14 | End: 2021-10-25

## 2021-02-25 ENCOUNTER — OFFICE VISIT (OUTPATIENT)
Dept: RHEUMATOLOGY | Age: 56
End: 2021-02-25
Payer: MEDICARE

## 2021-02-25 VITALS
DIASTOLIC BLOOD PRESSURE: 88 MMHG | SYSTOLIC BLOOD PRESSURE: 136 MMHG | BODY MASS INDEX: 45.04 KG/M2 | TEMPERATURE: 97.1 F | HEIGHT: 67 IN | WEIGHT: 287 LBS

## 2021-02-25 DIAGNOSIS — M15.9 PRIMARY OSTEOARTHRITIS INVOLVING MULTIPLE JOINTS: ICD-10-CM

## 2021-02-25 DIAGNOSIS — E55.9 VITAMIN D DEFICIENCY: ICD-10-CM

## 2021-02-25 DIAGNOSIS — R53.83 OTHER FATIGUE: ICD-10-CM

## 2021-02-25 DIAGNOSIS — R06.83 SNORING: ICD-10-CM

## 2021-02-25 DIAGNOSIS — M19.90 INFLAMMATORY ARTHRITIS: Primary | ICD-10-CM

## 2021-02-25 DIAGNOSIS — Z79.899 LONG-TERM USE OF PLAQUENIL: ICD-10-CM

## 2021-02-25 LAB
ALBUMIN SERPL-MCNC: 4.2 G/DL (ref 3.4–5)
ALP BLD-CCNC: 110 U/L (ref 40–129)
ALT SERPL-CCNC: 19 U/L (ref 10–40)
AST SERPL-CCNC: 16 U/L (ref 15–37)
BILIRUB SERPL-MCNC: <0.2 MG/DL (ref 0–1)
BILIRUBIN DIRECT: <0.2 MG/DL (ref 0–0.3)
BILIRUBIN, INDIRECT: NORMAL MG/DL (ref 0–1)
TOTAL PROTEIN: 7.4 G/DL (ref 6.4–8.2)
TSH REFLEX FT4: 1.32 UIU/ML (ref 0.27–4.2)
VITAMIN B-12: >2000 PG/ML (ref 211–911)
VITAMIN D 25-HYDROXY: 38.2 NG/ML

## 2021-02-25 PROCEDURE — 3017F COLORECTAL CA SCREEN DOC REV: CPT | Performed by: INTERNAL MEDICINE

## 2021-02-25 PROCEDURE — G8427 DOCREV CUR MEDS BY ELIG CLIN: HCPCS | Performed by: INTERNAL MEDICINE

## 2021-02-25 PROCEDURE — 99214 OFFICE O/P EST MOD 30 MIN: CPT | Performed by: INTERNAL MEDICINE

## 2021-02-25 PROCEDURE — 1111F DSCHRG MED/CURRENT MED MERGE: CPT | Performed by: INTERNAL MEDICINE

## 2021-02-25 PROCEDURE — G8484 FLU IMMUNIZE NO ADMIN: HCPCS | Performed by: INTERNAL MEDICINE

## 2021-02-25 PROCEDURE — 4004F PT TOBACCO SCREEN RCVD TLK: CPT | Performed by: INTERNAL MEDICINE

## 2021-02-25 PROCEDURE — G8417 CALC BMI ABV UP PARAM F/U: HCPCS | Performed by: INTERNAL MEDICINE

## 2021-02-25 RX ORDER — NAPROXEN 500 MG/1
500 TABLET ORAL 2 TIMES DAILY WITH MEALS
Qty: 60 TABLET | Refills: 5 | Status: SHIPPED | OUTPATIENT
Start: 2021-02-25 | End: 2022-09-08 | Stop reason: ALTCHOICE

## 2021-02-25 NOTE — PROGRESS NOTES
2021   Patient Name: Corie Marie  : 1965  Medical Record: [id number]    MEDICATIONS  Current Outpatient Medications   Medication Sig Dispense Refill    naproxen (NAPROSYN) 500 MG tablet Take 1 tablet by mouth 2 times daily (with meals) 60 tablet 5    levothyroxine (SYNTHROID) 300 MCG tablet TAKE ONE TABLET BY MOUTH DAILY 90 tablet 1    lidocaine 4 % external patch Place 1 patch onto the skin daily 1 box 1    simvastatin (ZOCOR) 20 MG tablet TAKE ONE TABLET BY MOUTH ONCE NIGHTLY 30 tablet 0    hydroxychloroquine (PLAQUENIL) 200 MG tablet TAKE ONE TABLET BY MOUTH TWICE A DAY 60 tablet 5    triamterene-hydroCHLOROthiazide (DYAZIDE) 37.5-25 MG per capsule TAKE ONE CAPSULE BY MOUTH DAILY 30 capsule 2    oxybutynin (DITROPAN-XL) 10 MG extended release tablet TAKE ONE TABLET BY MOUTH DAILY 30 tablet 2    folic acid (FOLVITE) 452 MCG tablet Take 400 mcg by mouth daily      vitamin D (CHOLECALCIFEROL) 1000 UNIT TABS tablet Take 1,000 Units by mouth daily      vitamin B-12 (CYANOCOBALAMIN) 1000 MCG tablet Take 1,000 mcg by mouth daily      Biotin 27716 MCG TABS Take by mouth       No current facility-administered medications for this visit. ALLERGIES  Allergies   Allergen Reactions    Glucosamine Chondroitin Complx [Glucosamine-Chondroitin] Swelling     \"Due to sulfa allergy\" per pt    Sulfa Antibiotics Itching, Swelling and Other (See Comments)     Pt states gets really hot like she is on fire,and swelling of face,hands, and feet    Erythromycin Swelling         Comments  No specialty comments available. Background history:  Corie Marie is a 60 y.o. female who is being seen for follow up evaluation of  joint pain. She is complaining of pain in the joints which started 2 weeks ago. She has pain in various joints but is worse in the hips, back, knee, shoulders, hands and wrists. She describes her pain as constant, 2 out of 10, aching associated with swelling and stiffness.   She tried meloxicam with minimal relief. She was placed on steroids for 1 week pain initially improved then started returning. She has a morning stiffness lasting for 2-3 hours. Father has rheumatoid arthritis.  -She also has a rash on both shins for past 2 years. The rash is getting worse. She saw the dermatologist and was not given any diagnosis. She had a biopsy by PCP which showed crushed inflammatory epidermis and showed possible dermal hypersensitivity reaction to a drug or other systemic agent. Rash is associated with itching and is scaly. She denies history of psoriasis, inflammatory bowel disease, inflammatory back pain, uveitis, enthesitis, tenosynovitis. There is no history of photosensitivity, malar rash, pleurisy or pericarditis, blood clots, miscarriages, raynaud's. She is complaining of tingling and numbness in both feet. She denies any weakness, wrist or foot drop  -workup shows negative RF, TYRELL, normal ESR. Interim history: She presents for follow-up of inflammatory arthritis and osteoarthritis. She is on Plaquenil 200 mg twice a day. She went off of Plaquenil for 20 half weeks and her joint symptoms worsened. She is now back on Plaquenil for past 2 weeks with improvement in joint symptoms. She still has pain in the hands mainly in her right DIP joints. She is off of meloxicam.  Did not notice much improvement on meloxicam.  She had last eye exam 2 years ago which was normal.  She is due for an eye exam.  She is also complaining of persistent fatigue. She snores at night. Blood work shows negative RF, CCP, TYRELL. HPI  ROS    REVIEW OF SYSTEMS: positive for fatigue, ringing in ears, dry mouth, anxiety and depression, sleep disturbance  Constitutional: No unanticipated weight loss or fevers.    Integumentary: No photosensitivity, malar rash, livedo reticularis, and Raynaud's symptoms, sclerodactyly, skin tightening  Eyes: negative for dry eyes, visual disturbance and persistent redness, discharge from eyes   ENT: - No loss of hearing, vertigo, or recurrent ear infections.  - No history of nasal/oral ulcers. Cardiovascular: No history of pericarditis, chest pain or murmur or palpitations  Respiratory: No shortness of breath, cough or history of interstitial lung disease. No history of pleurisy. No history of tuberculosis or atypical infections. Gastrointestinal: No history of heart burn, dysphagia or esophageal dysmotility. No inflammatory bowel disease. Genitourinary: No history renal disease, miscarriages. Hematologic/Lymphatic: No bleeding, blood clots or swollen lymph nodes. Neurological: No history seizure or focal weakness. No history of neuropathies, hyperesthesias, facial droop, diplopia  Psychiatric: No history of bipolar disease  Endocrine: Denies any parathyroid disease and osteoporosis  Allergic/Immunologic: No nasal congestion or hives. I have reviewed patients Past medical History, Social History and Family History as mentioned in her chart and this remains unchanged from previous.     Past Medical History:   Diagnosis Date    Anemia     Anesthesia complication     severe headache after woke up after wisdom teeth    Anxiety and depression     Chronic back pain     Condyloma acuminatum     Headache(784.0)     Hyperlipidemia     Hypertension     Hypothyroidism     IBS (irritable bowel syndrome)     Metrorrhagia     Neuropathy     Osteoarthritis     Urgency of urination     Wears glasses     driving at night/reading     Past Surgical History:   Procedure Laterality Date    LEEP      TOTAL HIP ARTHROPLASTY Left 08/16/2016    TOTAL HIP ARTHROPLASTY Right 11/16/2020    RIGHT LATERAL TOTAL HIP REPLACEMENT performed by Demond Loyd MD at [address] [place] [address] History     Socioeconomic History    Marital status:      Spouse name: Not on file    Number of children: 2    Years of education: Not on file    Highest education level: Not on file   Occupational History    Occupation:    Social Needs    Financial resource strain: Not on file    Food insecurity     Worry: Not on file     Inability: Not on file    Transportation needs     Medical: Not on file     Non-medical: Not on file   Tobacco Use    Smoking status: Current Every Day Smoker     Packs/day: 1.00     Years: 30.00     Pack years: 30.00     Types: Cigarettes    Smokeless tobacco: Never Used    Tobacco comment: encouraged to stop smoking    Substance and Sexual Activity    Alcohol use:  Yes     Alcohol/week: 0.0 standard drinks     Comment: rare    Drug use: Never    Sexual activity: Not Currently   Lifestyle    Physical activity     Days per week: Not on file     Minutes per session: Not on file    Stress: Not on file   Relationships    Social connections     Talks on phone: Not on file     Gets together: Not on file     Attends Jewish service: Not on file     Active member of club or organization: Not on file     Attends meetings of clubs or organizations: Not on file     Relationship status: Not on file    Intimate partner violence     Fear of current or ex partner: Not on file     Emotionally abused: Not on file     Physically abused: Not on file     Forced sexual activity: Not on file   Other Topics Concern    Not on file   Social History Narrative    Not on file     Family History   Problem Relation Age of Onset    Diabetes Other     High Blood Pressure Other     Stroke Other     Cancer Other         Bone    Alcohol Abuse Other     Heart Disease Mother         cardiac arrhythmia    High Blood Pressure Mother     Thyroid Disease Mother     Arthritis Father     Heart Disease Father         cabg    Diabetes Father     Stroke Father     Cancer Sister         breast cancer    Cancer Brother         lung             PHYSICAL EXAM   Vitals:    02/25/21 1450   BP: 136/88   Temp: 97.1 °F (36.2 °C)   Weight: 287 lb (130.2 kg)   Height: 5' 7\" (1.702 m)     Physical Exam  Constitutional:  Well developed, well nourished, no acute distress, non-toxic appearance   Musculoskeletal:    RIGHT  Swell  Tender  ROM  LEFT  Swell  Tender  ROM    DIP2  0  0  FULL   0  0  FULL    DIP3  0  0  FULL   0  0  FULL    DIP4  0  0  FULL   0  0  FULL    DIP5  0  0  FULL   0  0  FULL    PIP1  0  0  FULL   0  0  FULL    PIP2  0  0  FULL   0  0  FULL    PIP3  0  0  FULL   0  0  FULL    PIP4  0  0  FULL   0  0  FULL    PIP5  0  0  FULL   0  0  FULL    MCP1  0  0  FULL   0  0  FULL    MCP2  0 + FULL   0 0 FULL    MCP3  0 +  FULL   0 0 FULL    MCP4  0 0 FULL   0 0 FULL    MCP5  0 0 FULL   0  0  FULL    Wrist  0  0  FULL   0  0  FULL    Elbow  0  0  FULL   0  0  FULL    Shouldr  0  0  FULL   0  0  FULL    Hip  0  0  FULL   0  0  FULL    Knee  0  0  FULL   0  0  FULL    Ankle  0  0  FULL   0  0  FULL    MTP1  0  0  FULL   0  0  FULL    MTP2  0  0  FULL   0  0  FULL    MTP3  0  0  FULL   0  0  FULL    MTP4  0  0  FULL   0  0  FULL    MTP5  0  0  FULL   0  0  FULL    IP1  0  0  FULL   0  0  FULL    IP2  0  0  FULL   0  0  FULL    IP3  0  0  FULL   0  0  FULL    IP4  0  0  FULL   0  0  FULL    IP5  0  0  FULL   0 0 FULL    Puffiness in Right 2,3 mcp JOINTS     Ambulates without assistance, normal gait  Neck: Full ROM, no tenderness, supple   Back- no tenderness. Eyes:  PERRL, extra ocular movements intact, conjunctiva normal   HEENT:  Atraumatic, normocephalic, external ears normal, oropharynx moist, no pharyngeal exudates. Respiratory:  No respiratory distress  GI:  Soft, nondistended, normal bowel sounds, nontender, no organomegaly, no mass, no rebound, no guarding   :  No costovertebral angle tenderness   Integument:  Well hydrated, telangiectasias, erythematous patchy rash on bilateral shins with central clearing  Lymphatic:  No lymphadenopathy noted   Neurologic:   Alert & oriented x 3, CN 2-12 normal, no focal deficits noted. Sensations Intact.  Muscle strength 5/5 proximally and distally in upper and lower extremities. Psychiatric:  Speech and behavior appropriate             LABS AND IMAGING  Outside data reviewed and in HPI    Lab Results   Component Value Date    WBC 12.1 02/10/2021    RBC 5.03 02/10/2021    HGB 15.1 02/10/2021    HCT 46.3 02/10/2021     02/10/2021    MCV 92.2 02/10/2021    MCH 30.0 02/10/2021    MCHC 32.6 02/10/2021    RDW 16.6 02/10/2021    SEGSPCT 60.2 07/12/2010    LYMPHOPCT 26.7 02/10/2021    MONOPCT 7.9 02/10/2021    EOSPCT 0.9 07/12/2010    BASOPCT 0.4 02/10/2021    MONOSABS 1.0 02/10/2021    LYMPHSABS 3.2 02/10/2021    EOSABS 0.5 02/10/2021    BASOSABS 0.0 02/10/2021    DIFFTYPE Auto-K 07/12/2010       Chemistry        Component Value Date/Time     (L) 02/10/2021 0102    K 4.2 02/10/2021 0102    CL 97 (L) 02/10/2021 0102    CO2 25 02/10/2021 0102    BUN 17 02/10/2021 0102    CREATININE 0.9 02/10/2021 0102        Component Value Date/Time    CALCIUM 9.5 02/10/2021 0102    ALKPHOS 110 05/20/2020 1214    AST 16 05/20/2020 1214    ALT 20 05/20/2020 1214    BILITOT <0.2 05/20/2020 1214          Lab Results   Component Value Date    SEDRATE 19 11/09/2020     Lab Results   Component Value Date    CRP 3.7 05/07/2018     Lab Results   Component Value Date    TYRELL Negative 04/27/2018     Lab Results   Component Value Date    RF <10.0 04/27/2018    CCPABIGG 3 05/07/2018     Lab Results   Component Value Date    TYRELL Negative 04/27/2018    ANAINT see below 04/27/2018     No results found for: [facility], DSDNAIGGIFA  Lab Results   Component Value Date    SSALAAB 0 05/17/2018     No results found for: SMAB, RNPAB  No results found for: CENTABIGG  No results found for: C3, C4, ACE  Lab Results   Component Value Date    VITD25 44.3 11/09/2020       ASSESSMENT AND PLAN      Assessment/Plan:      ASSESSMENT:    1. Inflammatory arthritis    2. Primary osteoarthritis involving multiple joints    3. Long-term use of Plaquenil    4. Other fatigue    5.  Snoring 6. Vitamin D deficiency        PLAN:   1. Inflammatory arthritis  Most likely possibility seronegative rheumatoid arthritis  -no Active synovitis   -RF, CCP negative, no erosions on x-rays  -Continue Plaquenil 200 mg twice a day  Due for an eye exam, last eye exam 2 years ago    2. Primary osteoarthritis involving multiple joints  Continues to be symptomatic. No improvement with meloxicam.  Will start naproxen 500 mg twice a day    - naproxen (NAPROSYN) 500 MG tablet; Take 1 tablet by mouth 2 times daily (with meals)  Dispense: 60 tablet; Refill: 5  3. Long-term use of Plaquenil  Due for eye exam    4. Other fatigue  We will do work-up to rule out TSH, B12, vitamin D deficiency. Will send to sleep specialist due to snoring  - Hepatic Function Panel; Future  - TSH WITH REFLEX TO FT4; Future  - Vitamin B12; Future  - Vitamin D 25 Hydroxy; Future    5. Snoring  - Julio Reyes MD, Sleep Medicine, [facility]    6. Vitamin D deficiency  - Vitamin D 25 Hydroxy; Future    The patient indicates understanding of these issues and agrees with the plan. Return in about 6 months (around 8/25/2021). The risks and benefits of my recommendations, as well as other treatment options, benefits and side effects were discussed with the patient. All questions were answered. ######################################################################    I thank you for giving me the opportunity to participate in [address]. If you have any questions or concerns please feel free to contact me. I look forward to following  Michael along with you. Electronically signed by: Chen Su MD, 2/25/2021 3:23 PM    Documentation was done using voice recognition dragon software. Every effort was made to ensure accuracy; however, inadvertent unintentional computerized transcription errors may be present.

## 2021-02-27 DIAGNOSIS — N39.3 STRESS INCONTINENCE: ICD-10-CM

## 2021-02-27 RX ORDER — SIMVASTATIN 20 MG
TABLET ORAL
Qty: 30 TABLET | Refills: 0 | Status: SHIPPED | OUTPATIENT
Start: 2021-02-27 | End: 2021-04-25

## 2021-02-27 RX ORDER — OXYBUTYNIN CHLORIDE 10 MG/1
TABLET, EXTENDED RELEASE ORAL
Qty: 30 TABLET | Refills: 1 | Status: SHIPPED | OUTPATIENT
Start: 2021-02-27 | End: 2021-05-25

## 2021-03-09 NOTE — PROGRESS NOTES
[facility] time__3/12/21 0915__________        Surgery time___1015_________    Take the following medications with a sip of water:    Do not eat or drink anything after 12:00 midnight prior to your surgery. This includes water chewing gum, mints and ice chips. You may brush your teeth and gargle the morning of your surgery, but do not swallow the water     Please see your family doctor/pediatrician for a history and physical and/or concerning medications. Bring any test results/reports from your physicians office. If you are under the care of a heart doctor or specialist doctor, please be aware that you may be asked to them for clearance    You may be asked to stop blood thinners such as Coumadin, Plavix, Fragmin, Lovenox, etc., or any anti-inflammatories such as:  Aspirin, Ibuprofen, Advil, Naproxen prior to your surgery. We also ask that you stop any OTC medications such as fish oil, vitamin E, glucosamine, garlic, Multivitamins, COQ 10, etc.    We ask that you do not smoke 24 hours prior to surgery  We ask that you do not  drink any alcoholic beverages 24 hours prior to surgery     You must make arrangements for a responsible adult to take you home after your surgery. For your safety you will not be allowed to leave alone or drive yourself home. Your surgery will be cancelled if you do not have a ride home. Also for your safety, it is strongly suggested that someone stay with you the first 24 hours after your surgery. A parent or legal guardian must accompany a child scheduled for surgery and plan to stay at the hospital until the child is discharged. Please do not bring other children with you. For your comfort, please wear simple loose fitting clothing to the hospital.  Please do not bring valuables.     Do not wear any make-up or nail polish on your fingers or toes      For your safety, please do not wear any jewelry or body piercing's on the day of surgery. All jewelry must be removed. If you have dentures, they will be removed before going to operating room. For your convenience, we will provide you with a container. If you wear contact lenses or glasses, they will be removed, please bring a case for them. If you have a living will and a durable power of  for healthcare, please bring in a copy. As part of our patient safety program to minimize surgical site infections, we ask you to do the following:    · Please notify your surgeon if you develop any illness between         now and the  day of your surgery. · This includes a cough, cold, fever, sore throat, nausea,         or vomiting, and diarrhea, etc.  ·  Please notify your surgeon if you experience dizziness, shortness         of breath or blurred vision between now and the time of your surgery. Do not shave your operative site 96 hours prior to surgery. For face and neck surgery, men may use an electric razor 48 hours   prior to surgery. You may shower the night before surgery or the morning of   your surgery with an antibacterial soap. You will need to bring a photo ID and insurance card    [facility] has an onsite pharmacy, would you like to utilize our pharmacy     If you will be staying overnight and use a C-pap machine, please bring   your C-pap to hospital     Our goal is to provide you with excellent care, therefore, visitors will be limited to two(2) in the room at a time so that we may focus on providing this care for you. Please contact pre-admission testing if you have any further questions. [facility] phone number:  [phone]  Please note these are generalized instructions for all surgical cases, you may be provided with more specific instructions according to your surgery.

## 2021-03-09 NOTE — FLOWSHEET NOTE
Preoperative Screening for Elective Surgery/Invasive Procedures While COVID-19 present in the community     Have you tested positive or have been told to self-isolate for COVID-19 like symptoms within the past 28 days?  Do you currently have any of the following symptoms? o Fever >100.0 F or 99.9 F in immunocompromised patients? o New onset cough, shortness of breath or difficulty breathing?  o New onset sore throat, myalgia (muscle aches and pains), headache, loss of taste/smell or diarrhea?  Have you had a potential exposure to COVID-19 within the past 14 days by:  o Close contact with a confirmed case? o Close contact with a healthcare worker,  or essential infrastructure worker (grocery store, [employer], gas station, public utilities or transportation)? o Do you reside in a congregate setting such as; skilled nursing facility, adult home, correctional facility, homeless shelter or other institutional setting?  o Have you had recent travel to a known COVID-19 hotspot? Indicate if the patient has a positive screen by answering yes to one or more of the above questions. Patients who test positive or screen positive prior to surgery or on the day of surgery should be evaluated in conjunction with the surgeon/proceduralist/anesthesiologist to determine the urgency of the procedure. no to all above.

## 2021-03-12 ENCOUNTER — HOSPITAL ENCOUNTER (OUTPATIENT)
Age: 56
Setting detail: OUTPATIENT SURGERY
Discharge: HOME | End: 2021-03-12
Attending: ANESTHESIOLOGY | Admitting: ANESTHESIOLOGY
Payer: MEDICARE

## 2021-03-12 ENCOUNTER — APPOINTMENT (OUTPATIENT)
Dept: INTERVENTIONAL RADIOLOGY/VASCULAR | Age: 56
End: 2021-03-12
Attending: ANESTHESIOLOGY
Payer: MEDICARE

## 2021-03-12 VITALS
WEIGHT: 272.8 LBS | HEART RATE: 77 BPM | BODY MASS INDEX: 42.82 KG/M2 | HEIGHT: 67 IN | OXYGEN SATURATION: 95 % | DIASTOLIC BLOOD PRESSURE: 72 MMHG | TEMPERATURE: 97 F | SYSTOLIC BLOOD PRESSURE: 129 MMHG | RESPIRATION RATE: 14 BRPM

## 2021-03-12 PROCEDURE — 6360000004 HC RX CONTRAST MEDICATION: Performed by: ANESTHESIOLOGY

## 2021-03-12 PROCEDURE — 6360000002 HC RX W HCPCS: Performed by: ANESTHESIOLOGY

## 2021-03-12 PROCEDURE — 2709999900 HC NON-CHARGEABLE SUPPLY: Performed by: ANESTHESIOLOGY

## 2021-03-12 PROCEDURE — 3610000054 HC PAIN LEVEL 3 BASE (NON-OR): Performed by: ANESTHESIOLOGY

## 2021-03-12 RX ORDER — METHYLPREDNISOLONE ACETATE 80 MG/ML
INJECTION, SUSPENSION INTRA-ARTICULAR; INTRALESIONAL; INTRAMUSCULAR; SOFT TISSUE
Status: COMPLETED | OUTPATIENT
Start: 2021-03-12 | End: 2021-03-12

## 2021-03-12 ASSESSMENT — PAIN DESCRIPTION - ORIENTATION: ORIENTATION: LOWER

## 2021-03-12 ASSESSMENT — PAIN DESCRIPTION - ONSET: ONSET: ON-GOING

## 2021-03-12 ASSESSMENT — PAIN DESCRIPTION - LOCATION: LOCATION: BACK

## 2021-03-12 ASSESSMENT — PAIN DESCRIPTION - DESCRIPTORS: DESCRIPTORS: ACHING;BURNING

## 2021-03-12 ASSESSMENT — PAIN DESCRIPTION - PAIN TYPE: TYPE: CHRONIC PAIN

## 2021-03-12 ASSESSMENT — PAIN DESCRIPTION - PROGRESSION: CLINICAL_PROGRESSION: GRADUALLY WORSENING

## 2021-03-12 ASSESSMENT — PAIN - FUNCTIONAL ASSESSMENT: PAIN_FUNCTIONAL_ASSESSMENT: PREVENTS OR INTERFERES SOME ACTIVE ACTIVITIES AND ADLS

## 2021-03-12 ASSESSMENT — PAIN SCALES - GENERAL
PAINLEVEL_OUTOF10: 0
PAINLEVEL_OUTOF10: 3
PAINLEVEL_OUTOF10: 0

## 2021-03-12 ASSESSMENT — PAIN DESCRIPTION - FREQUENCY: FREQUENCY: INTERMITTENT

## 2021-03-12 NOTE — OP NOTE
Patient:  Vito Robledo  YOB: 1965  Medical Record #:  [id number]   Place: [facility]  Date:  3/12/2021   Physician:  Cheryl Louie MD      PRE-PROCEDURE DIAGNOSIS: M54.16    POST-PROCEDURE DIAGNOSIS: M54.16    PROCEDURE:  Midline interlaminar left  L4-5 epidural steroid injection with fluoroscopy and epidurography. BRIEF HISTORY:  The patient presents today to [facility] for a scheduled lumbar epidural steroid injection procedure. The patient was re-evaluated today and is clinically unchanged as compared to my previous evaluation. The patient is clinically stable to proceed with the procedure. PROCEDURE NOTE:  The procedure was again explained to the patient and the previously distributed pre-procedure literature was reviewed. The options, rationale, and benefits of the procedure including pain relief, functional improvement, and increased mobility, as well as the risks of the procedure including but not limited to infection, bleeding, paresthesia, pain, failure to relieve pain, increased pain, headache, allergic reaction, neurologic impairment, local anesthetic, toxicity, and side effects and the potential side effects of corticosteroids were discussed with the patient and informed written consent was obtained from the patient. The patient was positioned in the prone position on the fluoroscopy table. The skin overlying the lumbosacral vertebrae was prepped using Chloraprep and draped in the usual sterile fashion. The L4-5 lumbar intervertebral level was identified using intermittent AP fluoroscopy. The previously identified projection of overlying skin was anesthetized using 2 cc of buffered 1% lidocaine with a 27 gauge needle. A 4.25\" 22g Touhy needle was advanced through a small skin nick in the AP view towards the interlaminar and epidural space. The epidural space was easily identified using loss of resistance to saline.   No difficulty, paresthesia or occurrence of pain was encountered. Careful aspiration was negative for CSF and blood. A total of 1 cc Isovue 300 was injected yielding an epidurogram.    FLUOROSCOPY:  A fluoroscopy unit was utilized to obtain fluoroscopic images for intra-procedural use and assistance. Fluoroscopy was utilized to identify anatomic and radiographic landmarks for the accompanying procedure guidance and not for diagnostic purposes. After negative aspiration 4 cc of therapeutic injectate containing 1 cc of Depo-Medrol 80 mg/cc and 3 ml of lidocaine 1% was injected slowly in aliquots while clinically observing and monitoring the patient with negative aspiration demonstrated between aliquots of injections. At this time no paresthesia or occurrence of pain was present. The needle was then removed, the area was cleansed and a Band-Aid was placed over the injection site. There were no complications. The patient tolerated the procedure well. The procedure was performed using local anesthesia. The patient was transferred by wheelchair with accompaniment to the  Recovery Area and was monitored per protocol. The vital signs remained stable. The patient was discharged in stable condition accompanied by an escort with written instructions after fulfilling the standard discharge criteria. Written follow up instructions were given to the patient. Estimated Blood Loss: 0ml    Plan:  Follow up in 6-8 weeks       Modifier 22: Procedure took >75% more time than a typical procedure secondary to Body mass index is 42.73 kg/m². , making visualization and needle placement more difficult.         Office: [phone]

## 2021-03-12 NOTE — PROGRESS NOTES
Discussed potential for post injection weakness and numbness with patient. It was explained that due to the location of the injection numbness and/or weakness could occur in their lower extremities immediately following the injection and could last up to a couple of hours. Explained to patient they are not to stand following injection until a staff member is in the room with them to offer assistance in order for them to remain free from falls. Patient verbalized understanding, will continue to monitor patient as a high fall risk while in facility.

## 2021-03-12 NOTE — H&P
Other     Stroke Other     Cancer Other         Bone    Alcohol Abuse Other     Heart Disease Mother         cardiac arrhythmia    High Blood Pressure Mother     Thyroid Disease Mother     Arthritis Father     Heart Disease Father         cabg    Diabetes Father     Stroke Father     Cancer Sister         breast cancer    Cancer Brother         lung         PHYSICAL EXAM:      BP (!) 168/87   Pulse 94   Temp 97 °F (36.1 °C) (Temporal)   Resp 16   Ht 5' 7\" (1.702 m)   Wt 272 lb 12.8 oz (123.7 kg)   LMP 01/27/2015   SpO2 96%   BMI 42.73 kg/m²  I            ASSESSMENT AND PLAN:    1. Procedure. options, risks and benefits reviewed with patient and expresses understanding.

## 2021-03-23 DIAGNOSIS — R60.1 GENERALIZED EDEMA: ICD-10-CM

## 2021-03-23 RX ORDER — TRIAMTERENE AND HYDROCHLOROTHIAZIDE 37.5; 25 MG/1; MG/1
CAPSULE ORAL
Qty: 30 CAPSULE | Refills: 1 | Status: SHIPPED | OUTPATIENT
Start: 2021-03-23 | End: 2021-03-26

## 2021-03-25 ENCOUNTER — OFFICE VISIT (OUTPATIENT)
Dept: SLEEP MEDICINE | Age: 56
End: 2021-03-25
Payer: MEDICARE

## 2021-03-25 VITALS
HEIGHT: 67 IN | OXYGEN SATURATION: 95 % | RESPIRATION RATE: 18 BRPM | DIASTOLIC BLOOD PRESSURE: 80 MMHG | WEIGHT: 276 LBS | HEART RATE: 77 BPM | BODY MASS INDEX: 43.32 KG/M2 | SYSTOLIC BLOOD PRESSURE: 126 MMHG | TEMPERATURE: 97.5 F

## 2021-03-25 DIAGNOSIS — E66.01 CLASS 3 SEVERE OBESITY DUE TO EXCESS CALORIES WITH SERIOUS COMORBIDITY AND BODY MASS INDEX (BMI) OF 40.0 TO 44.9 IN ADULT (HCC): ICD-10-CM

## 2021-03-25 DIAGNOSIS — I10 ESSENTIAL HYPERTENSION: ICD-10-CM

## 2021-03-25 DIAGNOSIS — G47.33 OBSTRUCTIVE SLEEP APNEA: Primary | ICD-10-CM

## 2021-03-25 PROBLEM — F41.9 ANXIETY: Status: ACTIVE | Noted: 2021-03-25

## 2021-03-25 PROBLEM — F32.A DEPRESSION: Status: ACTIVE | Noted: 2021-03-25

## 2021-03-25 PROCEDURE — 3017F COLORECTAL CA SCREEN DOC REV: CPT | Performed by: PSYCHIATRY & NEUROLOGY

## 2021-03-25 PROCEDURE — 4004F PT TOBACCO SCREEN RCVD TLK: CPT | Performed by: PSYCHIATRY & NEUROLOGY

## 2021-03-25 PROCEDURE — 99204 OFFICE O/P NEW MOD 45 MIN: CPT | Performed by: PSYCHIATRY & NEUROLOGY

## 2021-03-25 PROCEDURE — G8417 CALC BMI ABV UP PARAM F/U: HCPCS | Performed by: PSYCHIATRY & NEUROLOGY

## 2021-03-25 PROCEDURE — G8484 FLU IMMUNIZE NO ADMIN: HCPCS | Performed by: PSYCHIATRY & NEUROLOGY

## 2021-03-25 PROCEDURE — G8427 DOCREV CUR MEDS BY ELIG CLIN: HCPCS | Performed by: PSYCHIATRY & NEUROLOGY

## 2021-03-25 ASSESSMENT — SLEEP AND FATIGUE QUESTIONNAIRES
HOW LIKELY ARE YOU TO NOD OFF OR FALL ASLEEP WHILE SITTING AND READING: 3
HOW LIKELY ARE YOU TO NOD OFF OR FALL ASLEEP WHILE SITTING AND TALKING TO SOMEONE: 1
NECK CIRCUMFERENCE (INCHES): 16.5
HOW LIKELY ARE YOU TO NOD OFF OR FALL ASLEEP WHILE SITTING QUIETLY AFTER LUNCH WITHOUT ALCOHOL: 2
ESS TOTAL SCORE: 17
HOW LIKELY ARE YOU TO NOD OFF OR FALL ASLEEP WHEN YOU ARE A PASSENGER IN A CAR FOR AN HOUR WITHOUT A BREAK: 2

## 2021-03-25 ASSESSMENT — ENCOUNTER SYMPTOMS
GASTROINTESTINAL NEGATIVE: 1
SHORTNESS OF BREATH: 1
APNEA: 1
EYES NEGATIVE: 1
ALLERGIC/IMMUNOLOGIC NEGATIVE: 1
CHOKING: 1

## 2021-03-25 NOTE — PROGRESS NOTES
Collette Haws, MD ABIM Board Certified in Sleep Medicine  Certified [facility] Sleep Medicine  Board Certified in Neurology [address]  [address] [facility]  [address] [place] [address] [place] [facility] [address], [address]           [address]  [address] [place] [address] [phone]    Subjective:     Patient ID: Bev White is a 60 y.o. female. Chief Complaint   Patient presents with    New Patient     refferal for possible NANCY       HPI:          Bev White is a 60 y.o. female referred by Dr. Vernon Tucker for a sleep evaluation. She complains of snoring, snorting, choking, tossing and turning, kicking, excessive daytime sleepiness, feels sleepy during the day, take naps during the day but she denies periods of not breathing, knees buckling with laughing, completely or partially paralyzed while falling asleep or waking up, noisy environment, uncomfortable room temperature, uncomfortable bedding. Symptoms began several years ago, gradually worsening since that time. The patient's bed-partner confirmed the snoring without the stopped breathing at night. SLEEP SCHEDULE: Goes to bed around 11 PM-1 AM in the weekdays and 11 PM-1 AM in the weekends. It usually takes the patient 10 minutes to fall asleep. The patient gets up 2-3 per night to go to the bathroom. The Patient finally gets up at 8:30-10 AM during the weekdays and 8:30-10 AM in the weekends. patient wakes up with dry mouth and sometimes morning headache. . the headache usually dull headache lasts 30-60 minutes. The patient has restless sleep with frequent arousals in addition to the Patient has significant daytime sleepiness.  The Patient scored Total score: 17 on Valier Sleepiness Scale ( more than 10 is indicative of daytime sleepiness)and 60 in fatigue scale ( more than 36 is indicative of daytime fatigue). The patient takes 1-2 naps/day for 20-60 minutes and usually is not refreshing nap. Previous evaluation and treatment has included- none. The Patient has been obese for many years and tried, has gained 80 in the last 5 years,  unsuccessfully to lose weight through diet, exercise. DOT/CDL - N/A  FAA/'slicense - N/A  The patient HTN is stable. Previous Report(s) Reviewed: historical medical records       Social History     Socioeconomic History    Marital status:      Spouse name: Not on file    Number of children: 2    Years of education: Not on file    Highest education level: Not on file   Occupational History    Occupation:    Social Needs    Financial resource strain: Not on file    Food insecurity     Worry: Not on file     Inability: Not on file   [place] needs     Medical: Not on file     Non-medical: Not on file   Tobacco Use    Smoking status: Current Every Day Smoker     Packs/day: 1.00     Years: 30.00     Pack years: 30.00     Types: Cigarettes    Smokeless tobacco: Never Used    Tobacco comment: encouraged to stop smoking    Substance and Sexual Activity    Alcohol use:  Yes     Alcohol/week: 0.0 standard drinks     Comment: rare    Drug use: Never    Sexual activity: Not Currently   Lifestyle    Physical activity     Days per week: Not on file     Minutes per session: Not on file    Stress: Not on file   Relationships    Social connections     Talks on phone: Not on file     Gets together: Not on file     Attends Mu-ism service: Not on file     Active member of club or organization: Not on file     Attends meetings of clubs or organizations: Not on file     Relationship status: Not on file    Intimate partner violence     Fear of current or ex partner: Not on file     Emotionally abused: Not on file     Physically abused: Not on file     Forced sexual activity: Not on file   Other Topics Concern    Not on file   Social Conjunctivitis    Condyloma acuminatum    Abnormal mammogram    Skin ulcer, limited to breakdown of skin ([address]    Arthritis of right hip    History of total hip replacement, right    Depression    Anxiety       Past Medical History:   Diagnosis Date    Anemia     Anesthesia complication     severe headache after woke up after wisdom teeth    Anxiety and depression     Chronic back pain     Condyloma acuminatum     Headache(784.0)     Hyperlipidemia     Hypertension     Hypothyroidism     IBS (irritable bowel syndrome)     Metrorrhagia     Neuropathy     Osteoarthritis     Urgency of urination     Wears glasses     driving at night/reading       Past Surgical History:   Procedure Laterality Date    LEEP      PAIN MANAGEMENT PROCEDURE Left 3/12/2021    LUMBAR EPIDURAL STEROID INJECTION LEFT L4-5 performed by Wojciech Garcia MD at [address] Left 08/16/2016    TOTAL HIP ARTHROPLASTY Right 11/16/2020    RIGHT LATERAL TOTAL HIP REPLACEMENT performed by Ruchi Vargas MD at [address] [facility]         Family History   Problem Relation Age of Onset    Diabetes Other     High Blood Pressure Other     Stroke Other     Cancer Other         Bone    Alcohol Abuse Other     Heart Disease Mother         cardiac arrhythmia    High Blood Pressure Mother     Thyroid Disease Mother     Arthritis Father     Heart Disease Father         cabg    Diabetes Father     Stroke Father     Cancer Sister         breast cancer    Cancer Brother         lung       Review of Systems   Constitutional: Positive for diaphoresis. HENT: Positive for congestion. Eyes: Negative. Respiratory: Positive for apnea, choking and shortness of breath. Cardiovascular: Negative. Negative for leg swelling. Gastrointestinal: Negative. Endocrine: Positive for cold intolerance and heat intolerance. Genitourinary: Positive for frequency. Musculoskeletal: Negative. Skin: Negative. Allergic/Immunologic: Negative. Neurological: Negative for headaches. Psychiatric/Behavioral: Positive for agitation. The patient is nervous/anxious. Objective:     Vitals:  Weight BMI Neck circumference    Wt Readings from Last 3 Encounters:   03/24/21 272 lb (123.4 kg)   03/25/21 276 lb (125.2 kg)   03/12/21 272 lb 12.8 oz (123.7 kg)    Body mass index is 43.23 kg/m². Neck circumference: 16.5     BP HR SaO2   BP Readings from Last 3 Encounters:   03/25/21 126/80   03/12/21 129/72   02/25/21 136/88    Pulse Readings from Last 3 Encounters:   03/25/21 77   03/12/21 77   02/11/21 80    SpO2 Readings from Last 3 Encounters:   03/25/21 95%   03/12/21 95%   02/11/21 92%        The mandibular molar Class :   [x]1 []2 []3      Mallampati I Airway Classification:   []1 []2 []3 [x]4        Physical Exam  Vitals signs and nursing note reviewed. Constitutional:       Appearance: She is obese. HENT:      Head: Atraumatic. Mouth/Throat:      Comments: Mallampati class 4, no retrognathia or hypognathia , clogged to the airflow in bilateral nostrils,  septum deviation to the right , crowded oropharynx with low soft palate, high arched hard palate,no tonsils enlargement. Eyes:      Extraocular Movements: Extraocular movements intact. Neck:      Musculoskeletal: Normal range of motion and neck supple. Cardiovascular:      Rate and Rhythm: Normal rate and regular rhythm. Heart sounds: Normal heart sounds. Pulmonary:      Effort: Pulmonary effort is normal.      Breath sounds: Normal breath sounds. Musculoskeletal: Normal range of motion. Skin:     General: Skin is warm. Neurological:      General: No focal deficit present. Psychiatric:         Mood and Affect: Mood normal.         Assessment:   Obstructive sleep apnea especially with snoring, snorting, daytime sleepiness, large neck circumference, Mallampati class of 4 and obesity. Diagnosis Orders   1. Obstructive sleep apnea  Baseline Diagnostic Sleep Study    Sleep Study with PAP Titration   2. Class 3 severe obesity due to excess calories with serious comorbidity and body mass index (BMI) of 40.0 to 44.9 in adult [facility])  Baseline Diagnostic Sleep Study    Ambulatory referral to Bariatrics   3. Essential hypertension  Baseline Diagnostic Sleep Study     Plan:     Patient was counseled about the pathophysiology of obstructive sleep apnea syndrome and the methods for evaluating its presence and severity. Patient was counseled to avoid driving and other potentially hazardous circumstances if the patient is experiencing excessive sleepiness. Treatment considerations include the use of nasal CPAP, oral dental appliance or a surgical intervention, which should be based on otolarygologic findings, In the meantime, the patient should be cautioned to avoid the use of alcohol or other depressant medications because of potential for increasing the duration and severity of apnea and cautioned regarding driving or operating and dangerous equipment if the patient is experiencing daytime sleepiness. .    We discussed the proportionality between weight and AHI. With 10% weight change, the AHI has a 27% proportionate change. With 20% weight change, the AHI has a 45-50% proportionate change. The Patient accepts referral to bariatrics for further consideration of weight loss methods. Orders Placed This Encounter   Procedures    Ambulatory referral to Bariatrics    Baseline Diagnostic Sleep Study    Sleep Study with PAP Titration       Return in about 3 months (around 6/25/2021) for to review the PSG and CPAP usage, Reveiwing CPAP usage and compliance report and tro.     Giovanni Dickens MD  Medical Director [address] [place] [address]

## 2021-03-25 NOTE — PATIENT INSTRUCTIONS
appointments, and call your doctor if you are having problems. It's also a good idea to know your test results and keep a list of the medicines you take. How can you care for yourself at home? · Lose weight, if needed. · Sleep on your side. It may help mild apnea. · Avoid alcohol and medicines such as sleeping pills, opioids, or sedatives before bed. · Don't smoke. If you need help quitting, talk to your doctor. · Prop up the head of your bed. · Treat breathing problems, such as a stuffy nose, that are caused by a cold or allergies. · Try a continuous positive airway pressure (CPAP) breathing machine if your doctor recommends it. · If CPAP doesn't work for you, ask your doctor if you can try other masks, settings, or breathing machines. · Try oral breathing devices or other nasal devices. · Talk to your doctor if your nose feels dry or bleeds, or if it gets runny or stuffy when you use a breathing machine. · Tell your doctor if you're sleepy during the day and it affects your daily life. Don't drive or operate machinery when you're drowsy. When should you call for help? Watch closely for changes in your health, and be sure to contact your doctor if:    · You still have sleep apnea even though you have made lifestyle changes.     · You are thinking of trying a device such as CPAP.     · You are having problems using a CPAP or similar machine.     · You are still sleepy during the day, and it affects your daily life. Where can you learn more? Go to https://Sprout SocialpePROSimity.[employer]. org and sign in to your NumberPicture account. Enter S501 in the Fileblaze box to learn more about \"Sleep Apnea: Care Instructions. \"     If you do not have an account, please click on the \"Sign Up Now\" link. Current as of: October 26, 2020               Content Version: 12.8  © 1855-2732 Healthwise, Incorporated. Care instructions adapted under license by [employer].  If you have questions about a medical condition or this instruction, always ask your healthcare professional. [place] [address] any warranty or liability for your use of this information.

## 2021-03-26 DIAGNOSIS — R60.1 GENERALIZED EDEMA: ICD-10-CM

## 2021-03-26 RX ORDER — TRIAMTERENE AND HYDROCHLOROTHIAZIDE 37.5; 25 MG/1; MG/1
CAPSULE ORAL
Qty: 30 CAPSULE | Refills: 1 | Status: SHIPPED | OUTPATIENT
Start: 2021-03-26 | End: 2021-07-08

## 2021-04-01 ENCOUNTER — HOSPITAL ENCOUNTER (OUTPATIENT)
Dept: SLEEP CENTER | Age: 56
Discharge: HOME | End: 2021-04-01
Payer: MEDICARE

## 2021-04-01 ENCOUNTER — OFFICE VISIT (OUTPATIENT)
Dept: ORTHOPEDIC SURGERY | Age: 56
End: 2021-04-01
Payer: MEDICARE

## 2021-04-01 VITALS
SYSTOLIC BLOOD PRESSURE: 123 MMHG | TEMPERATURE: 97.3 F | BODY MASS INDEX: 43.32 KG/M2 | WEIGHT: 276 LBS | HEIGHT: 67 IN | HEART RATE: 74 BPM | DIASTOLIC BLOOD PRESSURE: 75 MMHG

## 2021-04-01 DIAGNOSIS — Z96.641 HISTORY OF TOTAL HIP REPLACEMENT, RIGHT: Primary | ICD-10-CM

## 2021-04-01 LAB — SARS-COV-2: NOT DETECTED

## 2021-04-01 PROCEDURE — G8417 CALC BMI ABV UP PARAM F/U: HCPCS | Performed by: PHYSICIAN ASSISTANT

## 2021-04-01 PROCEDURE — 4004F PT TOBACCO SCREEN RCVD TLK: CPT | Performed by: PHYSICIAN ASSISTANT

## 2021-04-01 PROCEDURE — U0003 INFECTIOUS AGENT DETECTION BY NUCLEIC ACID (DNA OR RNA); SEVERE ACUTE RESPIRATORY SYNDROME CORONAVIRUS 2 (SARS-COV-2) (CORONAVIRUS DISEASE [COVID-19]), AMPLIFIED PROBE TECHNIQUE, MAKING USE OF HIGH THROUGHPUT TECHNOLOGIES AS DESCRIBED BY CMS-2020-01-R: HCPCS

## 2021-04-01 PROCEDURE — U0005 INFEC AGEN DETEC AMPLI PROBE: HCPCS

## 2021-04-01 PROCEDURE — 99213 OFFICE O/P EST LOW 20 MIN: CPT | Performed by: PHYSICIAN ASSISTANT

## 2021-04-01 PROCEDURE — 3017F COLORECTAL CA SCREEN DOC REV: CPT | Performed by: PHYSICIAN ASSISTANT

## 2021-04-01 PROCEDURE — G8427 DOCREV CUR MEDS BY ELIG CLIN: HCPCS | Performed by: PHYSICIAN ASSISTANT

## 2021-04-01 NOTE — PROGRESS NOTES
Subjective:      Patient ID: Christine Moya is a 60 y.o. female. Chief Complaint   Patient presents with    Follow-up     right hip - MARY 11.16.2020        HPI:  Here for follow up on right hip arthroplasty. The date of procedure- 11/16/2020. Surgeon: Nurys Mccarthy. Concerned about water in her gait. Also concerned about right hip clicking. History of left hip arthroplasty as well about 4- 5 years ago. No issues with left. Review of Systems:   Negative for fever or chills.     Past Medical History:   Diagnosis Date    Anemia     Anesthesia complication     severe headache after woke up after wisdom teeth    Anxiety and depression     Chronic back pain     Condyloma acuminatum     Headache(784.0)     Hyperlipidemia     Hypertension     Hypothyroidism     IBS (irritable bowel syndrome)     Metrorrhagia     Neuropathy     Osteoarthritis     Urgency of urination     Wears glasses     driving at night/reading       Family History   Problem Relation Age of Onset    Diabetes Other     High Blood Pressure Other     Stroke Other     Cancer Other         Bone    Alcohol Abuse Other     Heart Disease Mother         cardiac arrhythmia    High Blood Pressure Mother     Thyroid Disease Mother     Arthritis Father     Heart Disease Father         cabg    Diabetes Father     Stroke Father     Cancer Sister         breast cancer    Cancer Brother         lung       Past Surgical History:   Procedure Laterality Date    LEEP      PAIN MANAGEMENT PROCEDURE Left 3/12/2021    LUMBAR EPIDURAL STEROID INJECTION LEFT L4-5 performed by Jatinder Castro MD at [address] Left 08/16/2016    TOTAL HIP ARTHROPLASTY Right 11/16/2020    RIGHT LATERAL TOTAL HIP REPLACEMENT performed by Carly Swift MD at [address] [facility]         Social History     Occupational History    Occupation:    Tobacco Use    Smoking status: Current Every Day Smoker     Packs/day: 1.00     Years: 30.00     Pack years: 30.00     Types: Cigarettes    Smokeless tobacco: Never Used    Tobacco comment: encouraged to stop smoking    Substance and Sexual Activity    Alcohol use: Yes     Alcohol/week: 0.0 standard drinks     Comment: rare    Drug use: Never    Sexual activity: Not Currently       Current Outpatient Medications   Medication Sig Dispense Refill    triamterene-hydroCHLOROthiazide (DYAZIDE) 37.5-25 MG per capsule TAKE ONE CAPSULE BY MOUTH DAILY 30 capsule 1    simvastatin (ZOCOR) 20 MG tablet TAKE ONE TABLET BY MOUTH ONCE NIGHTLY 30 tablet 0    oxybutynin (DITROPAN-XL) 10 MG extended release tablet TAKE ONE TABLET BY MOUTH DAILY 30 tablet 1    naproxen (NAPROSYN) 500 MG tablet Take 1 tablet by mouth 2 times daily (with meals) 60 tablet 5    levothyroxine (SYNTHROID) 300 MCG tablet TAKE ONE TABLET BY MOUTH DAILY 90 tablet 1    hydroxychloroquine (PLAQUENIL) 200 MG tablet TAKE ONE TABLET BY MOUTH TWICE A DAY 60 tablet 5    folic acid (FOLVITE) 473 MCG tablet Take 400 mcg by mouth daily      vitamin D (CHOLECALCIFEROL) 1000 UNIT TABS tablet Take 1,000 Units by mouth daily      vitamin B-12 (CYANOCOBALAMIN) 1000 MCG tablet Take 1,000 mcg by mouth daily      Biotin 23719 MCG TABS Take by mouth       No current facility-administered medications for this visit. Objective:   She  is alert, oriented x 3, pleasant, well nourished, developed and in no acute distress. /75 (Site: Right Upper Arm, Position: Sitting)   Pulse 74   Temp 97.3 °F (36.3 °C) (Infrared)   Ht 5' 7\" (1.702 m)   Wt 276 lb (125.2 kg)   LMP 01/27/2015   BMI 43.23 kg/m²      HIP EXAM:  Examination of the right hip:  The incision is healed. There is no erythema. There is no pain with internal and external rotation. There is no pain with flexion and extension. There is no pain with active SLR. There is no pain with weight bearing.   She has a positive Trendelenburg

## 2021-04-07 ENCOUNTER — HOSPITAL ENCOUNTER (OUTPATIENT)
Dept: SLEEP CENTER | Age: 56
Discharge: HOME | End: 2021-04-07
Payer: MEDICARE

## 2021-04-07 DIAGNOSIS — E66.01 CLASS 3 SEVERE OBESITY DUE TO EXCESS CALORIES WITH SERIOUS COMORBIDITY AND BODY MASS INDEX (BMI) OF 40.0 TO 44.9 IN ADULT (HCC): ICD-10-CM

## 2021-04-07 DIAGNOSIS — I10 ESSENTIAL HYPERTENSION: ICD-10-CM

## 2021-04-07 DIAGNOSIS — G47.33 OBSTRUCTIVE SLEEP APNEA: ICD-10-CM

## 2021-04-07 PROCEDURE — 95810 POLYSOM 6/> YRS 4/> PARAM: CPT

## 2021-04-07 PROCEDURE — 95810 POLYSOM 6/> YRS 4/> PARAM: CPT | Performed by: PSYCHIATRY & NEUROLOGY

## 2021-04-12 ENCOUNTER — TELEPHONE (OUTPATIENT)
Dept: BARIATRICS/WEIGHT MGMT | Age: 56
End: 2021-04-12

## 2021-04-12 ENCOUNTER — TELEPHONE (OUTPATIENT)
Dept: PULMONOLOGY | Age: 56
End: 2021-04-12

## 2021-04-13 NOTE — TELEPHONE ENCOUNTER
Phone call from patient regarding her mask issue and she hasn't even been fully evaluated with a titration study. I advised her they will try different masks on her during the titration study and she expressed understanding. She was transferred to schedule.

## 2021-04-13 NOTE — TELEPHONE ENCOUNTER
Patient is calling back and stated that she does not have a mask, or a machine. The only thing that was given to her was FF mask to try to desensitize her for a mask and a machine when she gets one. Please call patient back once this message is addressed.    [phone]

## 2021-04-20 ENCOUNTER — TELEPHONE (OUTPATIENT)
Dept: FAMILY MEDICINE CLINIC | Age: 56
End: 2021-04-20

## 2021-04-23 NOTE — PLAN OF CARE
PT transported to floor via bed at this time in stable condition with IV fluids running by transport staff.      Nirmala Sharp RN  04/23/21 [id number] Problem: Falls - Risk of:  Goal: Will remain free from falls  Description: Fall risk assessment completed . Fall precautions in place, bed/ chair alarm on, side rails 2/4 up, call light in reach, educated pt on calling for assistance when needed, room clear of clutter. Pt verbalized understanding. Will remain free from falls  Outcome: Ongoing  Note: Fall risk assessment completed. Fall precautions in place. Call light within reach. Pt educated on calling for assistance before getting up. Walkway free of clutter. Will continue to monitor. Goal: Absence of physical injury  Outcome: Ongoing     Problem: Pain:  Goal: Pain level will decrease  Description: Pain level will decrease  Outcome: Ongoing  Note: Pt assessed for pain. Pt in pain and assessed with 0-10 pain rating scale. Pt given prescribed analgesic for pain. (See eMar) Pt satisfied with pain relief thus far. Will reassess and continue to monitor.      Goal: Control of acute pain  Description: Control of acute pain  Outcome: Ongoing

## 2021-04-25 RX ORDER — SIMVASTATIN 20 MG
TABLET ORAL
Qty: 30 TABLET | Refills: 0 | Status: SHIPPED | OUTPATIENT
Start: 2021-04-25 | End: 2021-05-25

## 2021-04-26 ENCOUNTER — HOSPITAL ENCOUNTER (OUTPATIENT)
Dept: SLEEP CENTER | Age: 56
Discharge: HOME | End: 2021-04-26
Payer: MEDICARE

## 2021-04-26 LAB — SARS-COV-2: NOT DETECTED

## 2021-04-26 PROCEDURE — U0005 INFEC AGEN DETEC AMPLI PROBE: HCPCS

## 2021-04-26 PROCEDURE — U0003 INFECTIOUS AGENT DETECTION BY NUCLEIC ACID (DNA OR RNA); SEVERE ACUTE RESPIRATORY SYNDROME CORONAVIRUS 2 (SARS-COV-2) (CORONAVIRUS DISEASE [COVID-19]), AMPLIFIED PROBE TECHNIQUE, MAKING USE OF HIGH THROUGHPUT TECHNOLOGIES AS DESCRIBED BY CMS-2020-01-R: HCPCS

## 2021-04-27 ENCOUNTER — HOSPITAL ENCOUNTER (OUTPATIENT)
Dept: SLEEP CENTER | Age: 56
Discharge: HOME | End: 2021-04-27
Payer: MEDICARE

## 2021-04-27 DIAGNOSIS — G47.33 OBSTRUCTIVE SLEEP APNEA: ICD-10-CM

## 2021-04-27 PROCEDURE — 95811 POLYSOM 6/>YRS CPAP 4/> PARM: CPT | Performed by: PSYCHIATRY & NEUROLOGY

## 2021-04-27 PROCEDURE — 95811 POLYSOM 6/>YRS CPAP 4/> PARM: CPT

## 2021-04-28 NOTE — PROGRESS NOTES
Shania Brumfield         : 1965  [] [facility]     [] [address]      [] [facility]     [][facility]    [] [facility]  [] [facility]   [] Other:  Diagnosis: [x] NANCY (G47.33) [] CSA (G47.31) [] Apnea (G47.30)   Length of Need: [] 12 Months [x] 99 Months [] Other:    Machine (YVONNE!): [x] Respironics Dream Station      Auto [x] ResMed AirSense     Auto [] Other:     [x]  CPAP () [] Bilevel ()   Mode: [x] Auto [] Spontaneous    Mode: [] Auto [] Spontaneous           Between 14 and 20 cm                 Comfort Settings:   - Ramp Pressure: 5 cmH2O                                        - Ramp time: 15 min                                     -  Flex/EPR - 3 full time                                    - For ResMed Bilevel (TiMax-4 sec   TiMin- 0.2 sec)     Humidifier: [x] Heated ()        [x] Water chamber replacement ()/ 1 per 6 months        Mask:  Please always start with the mask the patient used during the titraion   [x] Nasal () /1 per 3 months [] Full Face () /1 per 3 months   [x] Patient choice -Size and fit mask [] Patient Choice - Size and fit mask   [] Dispense: P10 [] Dispense:    [x] Headgear () / 1 per 3 months [] Headgear () / 1 per 3 months   [x] Replacement Nasal Cushion ()/2 per month [] Interface Replacement ()/1 per month   [x] Replacement Nasal Pillows ()/2 per month         Tubing: [x] Heated ()/1 per 3 months    [] Standard ()/1 per 3 months [] Other:           Filters: [x] Non-disposable ()/1 per 6 months     [x] Ultra-Fine, Disposable ()/2 per month        Miscellaneous: [x] Chin Strap ()/ 1 per 6 months [] O2 bleed-in:       LPM   [] Oximetry on CPAP/Bilevel []  Other:          Start Order Date: 21    MEDICAL JUSTIFICATION:  I, the undersigned, certify that the above prescribed supplies are medically necessary for this patients wellbeing.   In my opinion, the supplies are both reasonable and necessary in reference to

## 2021-04-30 ENCOUNTER — TELEPHONE (OUTPATIENT)
Dept: BARIATRICS/WEIGHT MGMT | Age: 56
End: 2021-04-30

## 2021-04-30 ENCOUNTER — TELEPHONE (OUTPATIENT)
Dept: PULMONOLOGY | Age: 56
End: 2021-04-30

## 2021-04-30 NOTE — TELEPHONE ENCOUNTER
Patient received digital seminar. She DOES have BWLS coverage with Medicare (No req diet)     *Spoke with pt. Info given. Pt verbalized understanding. Appt sched.  Per pt No hx of WLS, BMI > 35

## 2021-05-07 ENCOUNTER — TELEPHONE (OUTPATIENT)
Dept: PULMONOLOGY | Age: 56
End: 2021-05-07

## 2021-05-07 NOTE — TELEPHONE ENCOUNTER
[facility] [address]. Fax # [phone]. Pt called back a while ago with DME but was only placed on front of chart. Severe NANCY. Please ask [facility] to expedite if possible. Please send all orders, etc to [facility] fax number above.

## 2021-05-25 DIAGNOSIS — M19.90 INFLAMMATORY ARTHRITIS: ICD-10-CM

## 2021-05-25 DIAGNOSIS — N39.3 STRESS INCONTINENCE: ICD-10-CM

## 2021-05-25 RX ORDER — SIMVASTATIN 20 MG
TABLET ORAL
Qty: 30 TABLET | Refills: 0 | Status: SHIPPED | OUTPATIENT
Start: 2021-05-25 | End: 2021-07-08

## 2021-05-25 RX ORDER — OXYBUTYNIN CHLORIDE 10 MG/1
TABLET, EXTENDED RELEASE ORAL
Qty: 30 TABLET | Refills: 0 | Status: SHIPPED | OUTPATIENT
Start: 2021-05-25 | End: 2021-07-08

## 2021-05-25 RX ORDER — HYDROXYCHLOROQUINE SULFATE 200 MG/1
TABLET, FILM COATED ORAL
Qty: 60 TABLET | Refills: 4 | Status: SHIPPED | OUTPATIENT
Start: 2021-05-25 | End: 2021-12-28

## 2021-06-03 ENCOUNTER — OFFICE VISIT (OUTPATIENT)
Dept: BARIATRICS/WEIGHT MGMT | Age: 56
End: 2021-06-03
Payer: MEDICARE

## 2021-06-03 VITALS
SYSTOLIC BLOOD PRESSURE: 116 MMHG | HEART RATE: 106 BPM | DIASTOLIC BLOOD PRESSURE: 79 MMHG | BODY MASS INDEX: 44.42 KG/M2 | WEIGHT: 283 LBS | HEIGHT: 67 IN

## 2021-06-03 DIAGNOSIS — E78.2 HYPERLIPEMIA, MIXED: ICD-10-CM

## 2021-06-03 DIAGNOSIS — G47.33 OBSTRUCTIVE SLEEP APNEA, ADULT: ICD-10-CM

## 2021-06-03 DIAGNOSIS — E66.01 MORBID OBESITY WITH BMI OF 40.0-44.9, ADULT (HCC): Primary | ICD-10-CM

## 2021-06-03 DIAGNOSIS — I10 HYPERTENSION, ESSENTIAL: ICD-10-CM

## 2021-06-03 DIAGNOSIS — Z01.818 PRE-OPERATIVE CLEARANCE: ICD-10-CM

## 2021-06-03 PROCEDURE — G8427 DOCREV CUR MEDS BY ELIG CLIN: HCPCS | Performed by: SURGERY

## 2021-06-03 PROCEDURE — G8417 CALC BMI ABV UP PARAM F/U: HCPCS | Performed by: SURGERY

## 2021-06-03 PROCEDURE — 99204 OFFICE O/P NEW MOD 45 MIN: CPT | Performed by: SURGERY

## 2021-06-03 PROCEDURE — 3017F COLORECTAL CA SCREEN DOC REV: CPT | Performed by: SURGERY

## 2021-06-03 PROCEDURE — 4004F PT TOBACCO SCREEN RCVD TLK: CPT | Performed by: SURGERY

## 2021-06-03 NOTE — PROGRESS NOTES
[facility]) Physicians   General & Laparoscopic Surgery  [employer]      HPI:    Neel Chaves is a very pleasant 60 y.o. obese female ,   Body mass index is 44.32 kg/m². And multiple medical problems who is presenting for weight loss surgery evaluation and consultation by Dr. Nayeli Lara    Patient has been struggling for several years now with obesity. Patient feels the weight is an obstacle to achieve and perform things in daily living as well risk on health. Tries to diet, and exercise but can't keep the weight off. Patient tried other regimens, but with no sustainable weight loss. Patient  is very determined to lose weight and be healthy, and is interested in surgical weight loss to achieve this goal.    Otherwise patient denies any nausea, vomiting, fevers, chills, shortness of breath, chest pain, constipation or urinary symptoms.         Pain Assessment   Denies any abdominal pain     Past Medical History:   Diagnosis Date    Anemia     Anesthesia complication     severe headache after woke up after wisdom teeth    Anxiety and depression     Chronic back pain     Condyloma acuminatum     Headache(784.0)     Hyperlipemia, mixed     Hypertension     Hypertension, essential     Hypothyroidism     Hypothyroidism     IBS (irritable bowel syndrome)     Metrorrhagia     Morbid obesity with BMI of 40.0-44.9, adult (HCC)     Neuropathy     Obstructive sleep apnea, adult     cpap    Osteoarthritis     Pre-operative clearance     Urgency of urination     Wears glasses     driving at night/reading     Past Surgical History:   Procedure Laterality Date    HIP SURGERY  2016, 2020    LEEP      PAIN MANAGEMENT PROCEDURE Left 3/12/2021    LUMBAR EPIDURAL STEROID INJECTION LEFT L4-5 performed by Rere Castro MD at [address] Left 08/16/2016    TOTAL HIP ARTHROPLASTY Right 11/16/2020    RIGHT LATERAL TOTAL HIP REPLACEMENT performed by tablet, Rfl: 0    triamterene-hydroCHLOROthiazide (DYAZIDE) 37.5-25 MG per capsule, TAKE ONE CAPSULE BY MOUTH DAILY, Disp: 30 capsule, Rfl: 1    naproxen (NAPROSYN) 500 MG tablet, Take 1 tablet by mouth 2 times daily (with meals), Disp: 60 tablet, Rfl: 5    levothyroxine (SYNTHROID) 300 MCG tablet, TAKE ONE TABLET BY MOUTH DAILY, Disp: 90 tablet, Rfl: 1    folic acid (FOLVITE) 293 MCG tablet, Take 400 mcg by mouth daily, Disp: , Rfl:     vitamin D (CHOLECALCIFEROL) 1000 UNIT TABS tablet, Take 1,000 Units by mouth daily, Disp: , Rfl:     vitamin B-12 (CYANOCOBALAMIN) 1000 MCG tablet, Take 1,000 mcg by mouth daily, Disp: , Rfl:     Biotin 13321 MCG TABS, Take by mouth, Disp: , Rfl:       Review of Systems - History obtained from the patient  General ROS: negative  Psychological ROS: negative  Ophthalmic ROS: negative  Neurological ROS: negative  ENT ROS: negative  Allergy and Immunology ROS: negative  Hematological and Lymphatic ROS: negative  Endocrine ROS: negative  Breast ROS: negative  Respiratory ROS: negative  Cardiovascular ROS: negative  Gastrointestinal ROS:negative  Genito-Urinary ROS: negative  Musculoskeletal ROS: negative   Skin ROS: negative    Physical Exam   Constitutional: Patient is oriented to person, place, and time. Vital signs are normal. Patient  appears well-developed and well-nourished. Patient  is active and cooperative. Non-toxic appearance. No distress. HENT:   Head: Normocephalic and atraumatic. Head is without laceration. Right Ear: External ear normal. No lacerations. No drainage, swelling or tenderness. Left Ear: External ear normal. No lacerations. No drainage, swelling or tenderness. Nose: Nose normal. No nose lacerations or nasal deformity. Mouth/Throat: Uvula is midline, oropharynx is clear and moist and mucous membranes are normal. No oropharyngeal exudate. Eyes: Conjunctivae, EOM and lids are normal. Pupils are equal, round, and reactive to light.  Right eye exhibits no discharge. No foreign body present in the right eye. Left eye exhibits no discharge. No foreign body present in the left eye. No scleral icterus. Neck: Trachea normal and normal range of motion. Neck supple. No JVD present. No tracheal tenderness present. Carotid bruit is not present. No rigidity. No tracheal deviation and no edema present. No thyromegaly present. Cardiovascular: Normal rate, regular rhythm, normal heart sounds, intact distal pulses and normal pulses. Pulmonary/Chest: Effort normal and breath sounds normal. No stridor. No respiratory distress. Patient  has no wheezes. Patient has no rales. Patient exhibits no tenderness and no crepitus. Abdominal: Soft. Normal appearance and bowel sounds are normal. Patient exhibits no distension, no abdominal bruit, no ascites and no mass. There is no hepatosplenomegaly. There is no tenderness. There is no rigidity, no rebound, no guarding and no CVA tenderness. No hernia. Hernia confirmed negative in the ventral area. Musculoskeletal: Normal range of motion. Patient exhibits no edema or tenderness. Lymphadenopathy:        Head (right side): No submental, no submandibular, no preauricular, no posterior auricular and no occipital adenopathy present. Head (left side): No submental, no submandibular, no preauricular, no posterior auricular and no occipital adenopathy present. Patient  has no cervical adenopathy. Right: No supraclavicular adenopathy present. Left: No supraclavicular adenopathy present. Neurological: Patient is alert and oriented to person, place, and time. Patient has normal strength. Coordination and gait normal. GCS eye subscore is 4. GCS verbal subscore is 5. GCS motor subscore is 6. Skin: Skin is warm and dry. No abrasion and no rash noted. Patient  is not diaphoretic. No cyanosis or erythema. Psychiatric: Patient has a normal mood and affect.   speech is normal and behavior is normal. Cognition Sleeve Gastrectomy, which I believe is an excellent option for the patient. We will proceed with pre-operative work up labs and studies. Will also petition patient's  insurance for approval for this procedure. Patient received dietary handouts and education. Patient advised that its their responsibility to follow up for studies and/or labs ordered today. Also discussed in details the importance of follow up, as well following the recommendations and completing the whole program to improve outcomes when it comes to healthier lifestyle as well weight loss. Patient also advised about risks and benefits being on a strict dietary regimen as well using supplements. Patient agrees and wants to proceed with weight loss planning     Labs ordered. Cardiac Clearance. Sleep Clearance. Psych Evaluation. Sleep Study. Complete smoking cessation   H-pylori   EGD  Support Group. PCP Letter. Weight History    F/U in 4 weeks. Patient advised that its their responsibility to follow up for studies and/or labs ordered today.

## 2021-06-03 NOTE — PROGRESS NOTES
Melissa Marquez is a 60 y.o. female with a date of birth of 1965. Vitals:    06/03/21 1106   BP: 116/79   Pulse: 106    BMI: Body mass index is 44.32 kg/m². Obesity Classification: Class III    Weight History: Wt Readings from Last 3 Encounters:   06/03/21 283 lb (128.4 kg)   04/01/21 276 lb (125.2 kg)   03/25/21 276 lb (125.2 kg)       Patient's lowest adult weight was 165 lbs at age 23. Patient's highest adult weight was 283 lbs at age 60. Patient has participated in the following weight loss programs: [employer], Weight [employer], calorie restriction and increased exercise. Patient has not participated in meal replacement/liquid diets. Patient has participated in weight loss medications - Adipex 2 years ago. Patient is not lactose intolerant. Patient does not have Hindu/cultural food concerns. Patient does not have food allergies. Patient does tolerate artificial sweeteners. Patient tries to have a regular sleep/wake schedule; she started on cpap recently but states she can't tolerate  24 hour recall/food frequency chart:  Breakfast: yes. HN Cheerios with 2% milk OR bagel with cream cheese OR Harpreet Donnie sausage and egg croissant, coffee with 1/2 and 1/2  Snack: no.   Lunch: yes. Citizen of [place] ([facility]) and cheese sandwich with light diaz, chips OR hot dog, chips, Coke OR fast food [employer] Harsh OR [employer] chix sandwich OR [employer] beef and cheddar, ff, Coke  Snack: yes. String cheese OR cottage cheese OR nothing  Dinner: yes. Subway OR [employer] [place]arsh OR [place] OR [place] OR spaghetti or pot roast or salsbury steak, mashed potatoes, green beans  Snack: yes. smartpop popcorn OR chips OR pudding OR whips yogurt  Drinks throughout the day: uns iced tea, energy drink, coffee, Coke - 1 to 2 cans per day  Do you drink alcohol? Yes. How often/how much alcohol do you drink: 1 Beers per month. Patient does not meet the criteria for binge eating disorder. Patient does not have grazing.  Patient does infrequently

## 2021-06-11 ENCOUNTER — TELEPHONE (OUTPATIENT)
Dept: FAMILY MEDICINE CLINIC | Age: 56
End: 2021-06-11

## 2021-06-11 ENCOUNTER — OFFICE VISIT (OUTPATIENT)
Dept: FAMILY MEDICINE CLINIC | Age: 56
End: 2021-06-11
Payer: MEDICARE

## 2021-06-11 VITALS
HEART RATE: 85 BPM | SYSTOLIC BLOOD PRESSURE: 122 MMHG | BODY MASS INDEX: 44.32 KG/M2 | OXYGEN SATURATION: 97 % | DIASTOLIC BLOOD PRESSURE: 80 MMHG | HEIGHT: 67 IN

## 2021-06-11 DIAGNOSIS — R60.0 LOCALIZED EDEMA: Primary | ICD-10-CM

## 2021-06-11 PROCEDURE — G8427 DOCREV CUR MEDS BY ELIG CLIN: HCPCS | Performed by: FAMILY MEDICINE

## 2021-06-11 PROCEDURE — 3017F COLORECTAL CA SCREEN DOC REV: CPT | Performed by: FAMILY MEDICINE

## 2021-06-11 PROCEDURE — 4004F PT TOBACCO SCREEN RCVD TLK: CPT | Performed by: FAMILY MEDICINE

## 2021-06-11 PROCEDURE — 99213 OFFICE O/P EST LOW 20 MIN: CPT | Performed by: FAMILY MEDICINE

## 2021-06-11 PROCEDURE — G8417 CALC BMI ABV UP PARAM F/U: HCPCS | Performed by: FAMILY MEDICINE

## 2021-06-11 RX ORDER — TRIAMTERENE AND HYDROCHLOROTHIAZIDE 75; 50 MG/1; MG/1
1 TABLET ORAL DAILY
Qty: 30 TABLET | Refills: 5 | Status: SHIPPED | OUTPATIENT
Start: 2021-06-11 | End: 2022-09-08 | Stop reason: DRUGHIGH

## 2021-06-11 SDOH — ECONOMIC STABILITY: FOOD INSECURITY: WITHIN THE PAST 12 MONTHS, THE FOOD YOU BOUGHT JUST DIDN'T LAST AND YOU DIDN'T HAVE MONEY TO GET MORE.: NEVER TRUE

## 2021-06-11 SDOH — ECONOMIC STABILITY: FOOD INSECURITY: WITHIN THE PAST 12 MONTHS, YOU WORRIED THAT YOUR FOOD WOULD RUN OUT BEFORE YOU GOT MONEY TO BUY MORE.: NEVER TRUE

## 2021-06-11 ASSESSMENT — SOCIAL DETERMINANTS OF HEALTH (SDOH): HOW HARD IS IT FOR YOU TO PAY FOR THE VERY BASICS LIKE FOOD, HOUSING, MEDICAL CARE, AND HEATING?: NOT HARD AT ALL

## 2021-06-11 NOTE — TELEPHONE ENCOUNTER
Patient states she has pitting edema in her BLE/ankles/feet. States the skin is shiny. Requesting an appointment today.

## 2021-06-11 NOTE — PROGRESS NOTES
OUTPATIENT PROGRESS NOTE  Date of Service:  6/11/2021  Address: [address] [facility],[address] [address]  Dept: [phone]  Loc: [phone]    Subjective:      Patient ID:  [id number]  Abelino Melgoza is a 60 y.o. female     HPI  Edema  She has swelling in both of her feet  Concerned about the color  No pain    Just feel tight  Goes down over night     Review of Systems   Constitutional: Negative. HENT: Negative. Respiratory: Negative. Cardiovascular: Positive for leg swelling. Gastrointestinal: Negative. Neurological: Negative. Psychiatric/Behavioral: Positive for decreased concentration.        Objective:   YOB: 1965    Date of Visit:  6/11/2021       Allergies   Allergen Reactions    Glucosamine Chondroitin Complx [Glucosamine-Chondroitin] Swelling     \"Due to sulfa allergy\" per pt    Sulfa Antibiotics Itching, Swelling and Other (See Comments)     Pt states gets really hot like she is on fire,and swelling of face,hands, and feet    Erythromycin Swelling       Outpatient Medications Marked as Taking for the 6/11/21 encounter (Office Visit) with Tianna Powell, DO   Medication Sig Dispense Refill    hydroxychloroquine (PLAQUENIL) 200 MG tablet TAKE ONE TABLET BY MOUTH TWICE A DAY 60 tablet 4    simvastatin (ZOCOR) 20 MG tablet TAKE ONE TABLET BY MOUTH ONCE NIGHTLY 30 tablet 0    oxybutynin (DITROPAN-XL) 10 MG extended release tablet TAKE ONE TABLET BY MOUTH DAILY 30 tablet 0    triamterene-hydroCHLOROthiazide (DYAZIDE) 37.5-25 MG per capsule TAKE ONE CAPSULE BY MOUTH DAILY 30 capsule 1    naproxen (NAPROSYN) 500 MG tablet Take 1 tablet by mouth 2 times daily (with meals) 60 tablet 5    levothyroxine (SYNTHROID) 300 MCG tablet TAKE ONE TABLET BY MOUTH DAILY 90 tablet 1    folic acid (FOLVITE) 690 MCG tablet Take 400 mcg by mouth daily      vitamin D (CHOLECALCIFEROL) 1000 UNIT TABS tablet Take 1,000 Units by mouth daily      vitamin B-12 (CYANOCOBALAMIN) 1000 MCG tablet Take 1,000 mcg by mouth daily      Biotin 00163 MCG TABS Take by mouth         Vitals:    06/11/21 1403   BP: 122/80   Pulse: 85   SpO2: 97%   Height: 5' 7\" (1.702 m)     Body mass index is 44.32 kg/m². Wt Readings from Last 3 Encounters:   06/03/21 283 lb (128.4 kg)   04/01/21 276 lb (125.2 kg)   03/25/21 276 lb (125.2 kg)     BP Readings from Last 3 Encounters:   06/11/21 122/80   06/03/21 116/79   04/01/21 123/75       Physical Exam       Assessment/Plan       Assessment/plan;  Jo Veronica was seen today for edema. Diagnoses and all orders for this visit:    Localized edema    Other orders  -     triamterene-hydroCHLOROthiazide (MAXZIDE) 75-50 MG per tablet;  Take 1 tablet by mouth daily      Call if not improving                  Barb Crump, DO

## 2021-06-12 PROBLEM — L98.491 SKIN ULCER, LIMITED TO BREAKDOWN OF SKIN (HCC): Status: RESOLVED | Noted: 2020-11-11 | Resolved: 2021-06-12

## 2021-06-12 ASSESSMENT — PATIENT HEALTH QUESTIONNAIRE - PHQ9
SUM OF ALL RESPONSES TO PHQ QUESTIONS 1-9: 0
2. FEELING DOWN, DEPRESSED OR HOPELESS: 0
SUM OF ALL RESPONSES TO PHQ9 QUESTIONS 1 & 2: 0
1. LITTLE INTEREST OR PLEASURE IN DOING THINGS: 0
SUM OF ALL RESPONSES TO PHQ QUESTIONS 1-9: 0
SUM OF ALL RESPONSES TO PHQ QUESTIONS 1-9: 0

## 2021-06-12 ASSESSMENT — ENCOUNTER SYMPTOMS
RESPIRATORY NEGATIVE: 1
GASTROINTESTINAL NEGATIVE: 1

## 2021-06-23 ENCOUNTER — TELEMEDICINE (OUTPATIENT)
Dept: BARIATRICS/WEIGHT MGMT | Age: 56
End: 2021-06-23
Payer: MEDICARE

## 2021-06-23 DIAGNOSIS — Z71.89 INDIVIDUAL COUNSELING ENCOUNTER: Primary | ICD-10-CM

## 2021-06-23 PROCEDURE — 96156 HLTH BHV ASSMT/REASSESSMENT: CPT | Performed by: SOCIAL WORKER

## 2021-06-23 NOTE — PROGRESS NOTES
who has not had a related appointment within my department in the past 7 days or scheduled within the next 24 hours.     Total Time: minutes: 21-30 minutes    Note: not billable if this call serves to triage the patient into an appointment for the relevant concern      Allen Ley, [address] ,[place]

## 2021-07-08 ENCOUNTER — OFFICE VISIT (OUTPATIENT)
Dept: RHEUMATOLOGY | Age: 56
End: 2021-07-08
Payer: MEDICARE

## 2021-07-08 VITALS
SYSTOLIC BLOOD PRESSURE: 122 MMHG | BODY MASS INDEX: 44.17 KG/M2 | DIASTOLIC BLOOD PRESSURE: 78 MMHG | WEIGHT: 282 LBS | HEART RATE: 90 BPM

## 2021-07-08 DIAGNOSIS — M19.90 INFLAMMATORY ARTHRITIS: Primary | ICD-10-CM

## 2021-07-08 DIAGNOSIS — Z79.899 LONG-TERM USE OF PLAQUENIL: ICD-10-CM

## 2021-07-08 DIAGNOSIS — M15.9 PRIMARY OSTEOARTHRITIS INVOLVING MULTIPLE JOINTS: ICD-10-CM

## 2021-07-08 DIAGNOSIS — R53.83 OTHER FATIGUE: ICD-10-CM

## 2021-07-08 PROCEDURE — G8417 CALC BMI ABV UP PARAM F/U: HCPCS | Performed by: INTERNAL MEDICINE

## 2021-07-08 PROCEDURE — 4004F PT TOBACCO SCREEN RCVD TLK: CPT | Performed by: INTERNAL MEDICINE

## 2021-07-08 PROCEDURE — 99214 OFFICE O/P EST MOD 30 MIN: CPT | Performed by: INTERNAL MEDICINE

## 2021-07-08 PROCEDURE — G8427 DOCREV CUR MEDS BY ELIG CLIN: HCPCS | Performed by: INTERNAL MEDICINE

## 2021-07-08 PROCEDURE — 3017F COLORECTAL CA SCREEN DOC REV: CPT | Performed by: INTERNAL MEDICINE

## 2021-07-08 NOTE — PROGRESS NOTES
2021   Patient Name: Hiren Rucker  : 1965  Medical Record: [id number]    MEDICATIONS  Current Outpatient Medications   Medication Sig Dispense Refill    oxybutynin (DITROPAN-XL) 10 MG extended release tablet TAKE ONE TABLET BY MOUTH DAILY 30 tablet 0    simvastatin (ZOCOR) 20 MG tablet TAKE ONE TABLET BY MOUTH ONCE NIGHTLY 30 tablet 0    triamterene-hydroCHLOROthiazide (MAXZIDE) 75-50 MG per tablet Take 1 tablet by mouth daily 30 tablet 5    hydroxychloroquine (PLAQUENIL) 200 MG tablet TAKE ONE TABLET BY MOUTH TWICE A DAY 60 tablet 4    naproxen (NAPROSYN) 500 MG tablet Take 1 tablet by mouth 2 times daily (with meals) 60 tablet 5    levothyroxine (SYNTHROID) 300 MCG tablet TAKE ONE TABLET BY MOUTH DAILY 90 tablet 1    folic acid (FOLVITE) 054 MCG tablet Take 400 mcg by mouth daily      vitamin D (CHOLECALCIFEROL) 1000 UNIT TABS tablet Take 1,000 Units by mouth daily      vitamin B-12 (CYANOCOBALAMIN) 1000 MCG tablet Take 1,000 mcg by mouth daily      Biotin 70288 MCG TABS Take by mouth       No current facility-administered medications for this visit. ALLERGIES  Allergies   Allergen Reactions    Glucosamine Chondroitin Complx [Glucosamine-Chondroitin] Swelling     \"Due to sulfa allergy\" per pt    Sulfa Antibiotics Itching, Swelling and Other (See Comments)     Pt states gets really hot like she is on fire,and swelling of face,hands, and feet    Erythromycin Swelling         Comments  No specialty comments available. Background history:  Hiren Rucker is a 60 y.o. female who is being seen for follow up evaluation of  joint pain. She is complaining of pain in the joints which started 2 weeks ago. She has pain in various joints but is worse in the hips, back, knee, shoulders, hands and wrists. She describes her pain as constant, 2 out of 10, aching associated with swelling and stiffness. She tried meloxicam with minimal relief.   She was placed on steroids for 1 week pain initially improved then started returning. She has a morning stiffness lasting for 2-3 hours. Father has rheumatoid arthritis.  -She also has a rash on both shins for past 2 years. The rash is getting worse. She saw the dermatologist and was not given any diagnosis. She had a biopsy by PCP which showed crushed inflammatory epidermis and showed possible dermal hypersensitivity reaction to a drug or other systemic agent. Rash is associated with itching and is scaly. She denies history of psoriasis, inflammatory bowel disease, inflammatory back pain, uveitis, enthesitis, tenosynovitis. There is no history of photosensitivity, malar rash, pleurisy or pericarditis, blood clots, miscarriages, raynaud's. She is complaining of tingling and numbness in both feet. She denies any weakness, wrist or foot drop  -workup shows negative RF, TYRELL, normal ESR. Interim history: She presents for follow-up of inflammatory arthritis and osteoarthritis. She is on Plaquenil 200 mg twice a day day naproxen 500 mg twice a day. She reports improvement in joint pain, swelling and stiffness after starting naproxen. She denies any recent joint flareups. Recent eye exam was normal.  Fatigue has improved. She saw a sleep specialist and was diagnosed with obstructive sleep apnea. She was given CPAP. Blood work shows negative RF, CCP, TYRELL. HPI  ROS    REVIEW OF SYSTEMS: positive for fatigue, ringing in ears, dry mouth, anxiety and depression, sleep disturbance  Constitutional: No unanticipated weight loss or fevers. Integumentary: No photosensitivity, malar rash, livedo reticularis, and Raynaud's symptoms, sclerodactyly, skin tightening  Eyes: negative for dry eyes, visual disturbance and persistent redness, discharge from eyes   ENT: - No loss of hearing, vertigo, or recurrent ear infections.  - No history of nasal/oral ulcers.   Cardiovascular: No history of pericarditis, chest pain or murmur or palpitations  Respiratory: No shortness of breath, cough or history of interstitial lung disease. No history of pleurisy. No history of tuberculosis or atypical infections. Gastrointestinal: No history of heart burn, dysphagia or esophageal dysmotility. No inflammatory bowel disease. Genitourinary: No history renal disease, miscarriages. Hematologic/Lymphatic: No bleeding, blood clots or swollen lymph nodes. Neurological: No history seizure or focal weakness. No history of neuropathies, hyperesthesias, facial droop, diplopia  Psychiatric: No history of bipolar disease  Endocrine: Denies any parathyroid disease and osteoporosis  Allergic/Immunologic: No nasal congestion or hives. I have reviewed patients Past medical History, Social History and Family History as mentioned in her chart and this remains unchanged from previous.     Past Medical History:   Diagnosis Date    Anemia     Anesthesia complication     severe headache after woke up after wisdom teeth    Anxiety and depression     Chronic back pain     Condyloma acuminatum     Headache(784.0)     Hyperlipemia, mixed     Hypertension     Hypertension, essential     Hypothyroidism     Hypothyroidism     IBS (irritable bowel syndrome)     Metrorrhagia     Morbid obesity with BMI of 40.0-44.9, adult (HCC)     Neuropathy     Obstructive sleep apnea, adult     cpap    Osteoarthritis     Pre-operative clearance     Urgency of urination     Wears glasses     driving at night/reading     Past Surgical History:   Procedure Laterality Date    HIP SURGERY  2016, 2020    LEEP      PAIN MANAGEMENT PROCEDURE Left 3/12/2021    LUMBAR EPIDURAL STEROID INJECTION LEFT L4-5 performed by Rosa Isela Myers MD at [address] Left 08/16/2016    TOTAL HIP ARTHROPLASTY Right 11/16/2020    RIGHT LATERAL TOTAL HIP REPLACEMENT performed by Ian Saini MD at [address] [facility] History     Socioeconomic History    Marital status:      Spouse name: Not on file    Number of children: 2    Years of education: Not on file    Highest education level: Not on file   Occupational History    Occupation:    Tobacco Use    Smoking status: Current Every Day Smoker     Packs/day: 1.00     Years: 30.00     Pack years: 30.00     Types: Cigarettes    Smokeless tobacco: Never Used    Tobacco comment: encouraged to stop smoking    Vaping Use    Vaping Use: Never used   Substance and Sexual Activity    Alcohol use: Yes     Alcohol/week: 0.0 standard drinks     Comment: rare    Drug use: Never    Sexual activity: Not Currently   Other Topics Concern    Not on file   Social History Narrative    Not on file     Social Determinants of Health     Financial Resource Strain: Low Risk     Difficulty of Paying Living Expenses: Not hard at all   Food Insecurity: No Food Insecurity    Worried About [address] in the Last Year: Never true    [facility] [employer] in the Last Year: Never true   Transportation Needs:     Lack of Transportation (Medical):      Lack of Transportation (Non-Medical):    Physical Activity:     Days of Exercise per Week:     Minutes of Exercise per Session:    Stress:     Feeling of Stress :    Social Connections:     Frequency of Communication with Friends and Family:     Frequency of Social Gatherings with Friends and Family:     Attends [employer] Services:     Active Member of Clubs or Organizations:     Attends Club or Organization Meetings:     Marital Status:    Intimate Partner Violence:     Fear of Current or Ex-Partner:     Emotionally Abused:     Physically Abused:     Sexually Abused:      Family History   Problem Relation Age of Onset    Diabetes Other     High Blood Pressure Other     Stroke Other     Cancer Other         Bone    Alcohol Abuse Other     Heart Disease Mother         cardiac arrhythmia    High Blood Pressure Mother     Thyroid Disease Mother nondistended, normal bowel sounds, nontender, no organomegaly, no mass, no rebound, no guarding   :  No costovertebral angle tenderness   Integument:  Well hydrated, telangiectasias, erythematous patchy rash on bilateral shins with central clearing  Lymphatic:  No lymphadenopathy noted   Neurologic:   Alert & oriented x 3, CN 2-12 normal, no focal deficits noted. Sensations Intact. Muscle strength 5/5 proximally and distally in upper and lower extremities.    Psychiatric:  Speech and behavior appropriate             LABS AND IMAGING  Outside data reviewed and in HPI    Lab Results   Component Value Date    WBC 12.1 02/10/2021    RBC 5.03 02/10/2021    HGB 15.1 02/10/2021    HCT 46.3 02/10/2021     02/10/2021    MCV 92.2 02/10/2021    MCH 30.0 02/10/2021    MCHC 32.6 02/10/2021    RDW 16.6 02/10/2021    SEGSPCT 60.2 07/12/2010    LYMPHOPCT 26.7 02/10/2021    MONOPCT 7.9 02/10/2021    EOSPCT 0.9 07/12/2010    BASOPCT 0.4 02/10/2021    MONOSABS 1.0 02/10/2021    LYMPHSABS 3.2 02/10/2021    EOSABS 0.5 02/10/2021    BASOSABS 0.0 02/10/2021    DIFFTYPE Auto-K 07/12/2010       Chemistry        Component Value Date/Time     (L) 02/10/2021 0102    K 4.2 02/10/2021 0102    CL 97 (L) 02/10/2021 0102    CO2 25 02/10/2021 0102    BUN 17 02/10/2021 0102    CREATININE 0.9 02/10/2021 0102        Component Value Date/Time    CALCIUM 9.5 02/10/2021 0102    ALKPHOS 110 02/25/2021 1537    AST 16 02/25/2021 1537    ALT 19 02/25/2021 1537    BILITOT <0.2 02/25/2021 1537          Lab Results   Component Value Date    SEDRATE 19 11/09/2020     Lab Results   Component Value Date    CRP 3.7 05/07/2018     Lab Results   Component Value Date    TYRELL Negative 04/27/2018     Lab Results   Component Value Date    RF <10.0 04/27/2018    CCPABIGG 3 05/07/2018     Lab Results   Component Value Date    TYRELL Negative 04/27/2018    ANAINT see below 04/27/2018     No results found for: [facility], DSDNAIGGIFA  Lab Results   Component Value Date SSALAAB 0 05/17/2018     No results found for: SMAB, RNPAB  No results found for: CENTABIGG  No results found for: C3, C4, ACE  Lab Results   Component Value Date    VITD25 38.2 02/25/2021       ASSESSMENT AND PLAN      Assessment/Plan:      ASSESSMENT:    1. Inflammatory arthritis    2. Primary osteoarthritis involving multiple joints    3. Long-term use of Plaquenil    4. Other fatigue        PLAN:   1. Inflammatory arthritis  Most likely possibility seronegative rheumatoid arthritis  -no Active synovitis   -RF, CCP negative, no erosions on x-rays  -Continue Plaquenil 200 mg twice a day  Up-to-date with eye exam    2. Primary osteoarthritis involving multiple joints  Stable. We will continue naproxen 500 mg twice a day. No improvement with meloxicam.    3. Long-term use of Plaquenil  Up-to-date with eye exam    4. Other fatigue  Improved. TSH, B12, vitamin D normal      The patient indicates understanding of these issues and agrees with the plan. Return in about 6 months (around 1/8/2022). The risks and benefits of my recommendations, as well as other treatment options, benefits and side effects were discussed with the patient. All questions were answered. ######################################################################    I thank you for giving me the opportunity to participate in [address]. If you have any questions or concerns please feel free to contact me. I look forward to following  [facility] along with you. Electronically signed by: Eleni Maharaj MD, MD, 7/8/2021 2:47 PM    Documentation was done using voice recognition dragon software. Every effort was made to ensure accuracy; however, inadvertent unintentional computerized transcription errors may be present.

## 2021-07-20 ENCOUNTER — TELEMEDICINE (OUTPATIENT)
Dept: BARIATRICS/WEIGHT MGMT | Age: 56
End: 2021-07-20

## 2021-07-20 DIAGNOSIS — Z71.3 DIETARY COUNSELING AND SURVEILLANCE: ICD-10-CM

## 2021-07-20 DIAGNOSIS — E66.01 MORBID OBESITY WITH BMI OF 40.0-44.9, ADULT (HCC): Primary | ICD-10-CM

## 2021-07-20 PROCEDURE — G8427 DOCREV CUR MEDS BY ELIG CLIN: HCPCS | Performed by: FAMILY MEDICINE

## 2021-07-20 PROCEDURE — 3017F COLORECTAL CA SCREEN DOC REV: CPT | Performed by: FAMILY MEDICINE

## 2021-07-20 PROCEDURE — 99204 OFFICE O/P NEW MOD 45 MIN: CPT | Performed by: FAMILY MEDICINE

## 2021-07-20 ASSESSMENT — ENCOUNTER SYMPTOMS
COUGH: 0
VOMITING: 0
CONSTIPATION: 0
ABDOMINAL PAIN: 0
ABDOMINAL DISTENTION: 0
SHORTNESS OF BREATH: 0
BLOOD IN STOOL: 0
NAUSEA: 0
APNEA: 0
CHEST TIGHTNESS: 0
WHEEZING: 0
DIARRHEA: 0
CHOKING: 0
EYE PAIN: 0
PHOTOPHOBIA: 0

## 2021-07-20 NOTE — PROGRESS NOTES
Patient: Mellisa Fabian     Encounter Date: 7/20/2021    YOB: 1965               Age: 60 y.o. Patient identification was verified at the start of the visit. No flowsheet data found. BP Readings from Last 1 Encounters:   07/08/21 122/78       BMI Readings from Last 1 Encounters:   07/08/21 44.17 kg/m²       Pulse Readings from Last 1 Encounters:   07/08/21 90                                              Wt Readings from Last 3 Encounters:   07/08/21 282 lb (127.9 kg)   06/03/21 283 lb (128.4 kg)   04/01/21 276 lb (125.2 kg)       Chief Complaint   Patient presents with    Bariatric, Initial Visit     MWM       Wt Readings from Last 3 Encounters:   07/08/21 282 lb (127.9 kg)   06/03/21 283 lb (128.4 kg)   04/01/21 276 lb (125.2 kg)       HPI:    60 y.o. female presents to [facility] care via video visit. She was referred by Dr. Ita Weiss for medical weight management. The patient's medical history is significant for class III obesity. The patient has a long-standing history of obesity which started gradually. The problem is severe. The patient has been gaining weight. Risk factors include annual weight gain of >2 lbs (1 kg)/ year and sedentary lifestyle. Aggravating factors include poor diet and lack of physical activity. The patient has tried various diet/exercise plans which have been ineffective in the long-run. Interested in aom  Has upcoming cataract surgery next month     When did you become overweight? [] Childhood   [x] Teens   [] Adulthood   [] Pregnancy   [] Menopause    Highest adult weight: 283 pounds at age 60    Triggers for weight gain? [] Stress   [] Illness   [] Medications   [] Travel  []Injury     [] Nightshift work   [] Insomnia  [x] No specific triggers   [] Other    Food triggers:   [x] Stress   [x] Boredom   [x] Fast food   [x] Eating out   [] Seeking reward   [] Social     Have you ever taken medications to help you lose weight?    [x] Yes- Adipex- stopped tx in November 2019   [] No    Have you ever been on a meal replacement program?  [] Yes  [x] No          Dietitian's assessment reviewed and addressed with patient. Reviewed:  [x] Nutrition and the importance of regular protein intake  [x] Hidden CHO/carbohydrate sources  [x] Alcohol use  [x] Tobacco use  [x] Drug use- Denies   [x] Importance of exercise and reducing sedentary time      Controlled Substance Monitoring:     RX Monitoring 10/19/2020   Attestation -   Periodic Controlled Substance Monitoring Possible medication side effects, risk of tolerance/dependence & alternative treatments discussed.         Allergies   Allergen Reactions    Glucosamine Chondroitin Complx [Glucosamine-Chondroitin] Swelling     \"Due to sulfa allergy\" per pt    Sulfa Antibiotics Itching, Swelling and Other (See Comments)     Pt states gets really hot like she is on fire,and swelling of face,hands, and feet    Erythromycin Swelling         Current Outpatient Medications:     oxybutynin (DITROPAN-XL) 10 MG extended release tablet, TAKE ONE TABLET BY MOUTH DAILY, Disp: 30 tablet, Rfl: 0    simvastatin (ZOCOR) 20 MG tablet, TAKE ONE TABLET BY MOUTH ONCE NIGHTLY, Disp: 30 tablet, Rfl: 0    triamterene-hydroCHLOROthiazide (MAXZIDE) 75-50 MG per tablet, Take 1 tablet by mouth daily, Disp: 30 tablet, Rfl: 5    hydroxychloroquine (PLAQUENIL) 200 MG tablet, TAKE ONE TABLET BY MOUTH TWICE A DAY, Disp: 60 tablet, Rfl: 4    naproxen (NAPROSYN) 500 MG tablet, Take 1 tablet by mouth 2 times daily (with meals), Disp: 60 tablet, Rfl: 5    levothyroxine (SYNTHROID) 300 MCG tablet, TAKE ONE TABLET BY MOUTH DAILY, Disp: 90 tablet, Rfl: 1    folic acid (FOLVITE) 280 MCG tablet, Take 400 mcg by mouth daily, Disp: , Rfl:     vitamin D (CHOLECALCIFEROL) 1000 UNIT TABS tablet, Take 1,000 Units by mouth daily, Disp: , Rfl:     vitamin B-12 (CYANOCOBALAMIN) 1000 MCG tablet, Take 1,000 mcg by mouth daily, Disp: , Rfl:     Biotin 91964 MCG TABS, Take by mouth, Disp: , Rfl:     Patient Active Problem List   Diagnosis    Sebas's deformity of right heel    Primary osteoarthritis of both knees    Arthritis of left hip    Class 2 severe obesity due to excess calories with serious comorbidity and body mass index (BMI) of 37.0 to 37.9 in adult [facility])    Osteoarthritis of multiple joints    Migraine without aura    Metrorrhagia    Mammographic microcalcification    Intractable back pain    Hypothyroidism    Conjunctivitis    Condyloma acuminatum    Abnormal mammogram    Arthritis of right hip    History of total hip replacement, right    Depression    Anxiety    Obstructive sleep apnea    Central sleep apnea    PLMD (periodic limb movement disorder)    Hypertension, essential    Hyperlipemia, mixed    Obstructive sleep apnea, adult    Morbid obesity with BMI of 40.0-44.9, adult ([facility])       Past Medical History:   Diagnosis Date    Anemia     Anesthesia complication     severe headache after woke up after wisdom teeth    Anxiety and depression     Chronic back pain     Condyloma acuminatum     Headache(784.0)     Hyperlipemia, mixed     Hypertension     Hypertension, essential     Hypothyroidism     Hypothyroidism     IBS (irritable bowel syndrome)     Metrorrhagia     Morbid obesity with BMI of 40.0-44.9, adult ([facility])     Neuropathy     Obstructive sleep apnea, adult     cpap    Osteoarthritis     Pre-operative clearance     Urgency of urination     Wears glasses     driving at night/reading       Past Surgical History:   Procedure Laterality Date    HIP SURGERY  2016, 2020    LEEP      PAIN MANAGEMENT PROCEDURE Left 3/12/2021    LUMBAR EPIDURAL STEROID INJECTION LEFT L4-5 performed by Keiry Henao MD at [address] [place] Left 08/16/2016    TOTAL HIP ARTHROPLASTY Right 11/16/2020    RIGHT LATERAL TOTAL HIP REPLACEMENT performed by Renny Robison MD at [address] [place] [address] EXTRACTION         Family History   Problem Relation Age of Onset    Diabetes Other     High Blood Pressure Other     Stroke Other     Cancer Other         Bone    Alcohol Abuse Other     Heart Disease Mother         cardiac arrhythmia    High Blood Pressure Mother     Thyroid Disease Mother     Hypertension Mother     Cancer Mother     Other Mother         blood clots    Migraines Mother     Arthritis Mother     Arthritis Father     Heart Disease Father         cabg    Diabetes Father     Stroke Father     Hypertension Father     Other Father         blood clots    Cancer Sister         breast cancer    Cancer Brother         lung       Review of Systems   Constitutional: Negative for fatigue. Eyes: Negative for photophobia, pain and visual disturbance. Respiratory: Negative for apnea, cough, choking, chest tightness, shortness of breath and wheezing. Cardiovascular: Negative for chest pain, palpitations and leg swelling. Gastrointestinal: Negative for abdominal distention, abdominal pain, blood in stool, constipation, diarrhea, nausea and vomiting. Endocrine: Negative for cold intolerance and heat intolerance. Musculoskeletal: Negative for arthralgias and myalgias. Skin: Negative for rash. Neurological: Negative for dizziness, tremors, syncope, weakness, numbness and headaches. Psychiatric/Behavioral: Negative for agitation, confusion, decreased concentration, dysphoric mood, hallucinations, sleep disturbance and suicidal ideas. The patient is not nervous/anxious and is not hyperactive. Physical Exam  Constitutional:       Appearance: She is well-developed. HENT:      Head: Normocephalic. Eyes:      Conjunctiva/sclera: Conjunctivae normal.   Abdominal:      General: Abdomen is protuberant. Musculoskeletal:         General: No swelling. Neurological:      Mental Status: She is alert and oriented to person, place, and time.    Psychiatric:         Mood and Affect: Mood normal.         Behavior: Behavior normal.         Thought Content: Thought content normal.         Judgment: Judgment normal.         Hospital Outpatient Visit on 04/26/2021   Component Date Value Ref Range Status    SARS-CoV-2 04/26/2021 Not Detected  Not detected Final    Comment: This test has been authorized by FDA under an  Emergency Use Authorization (EUA). This test is only authorized for the duration of the  time of declaration that circumstances exist justifying the  authorization of the emergency use of in vitro diagnostic  testing for detection of the SARS-CoV-2 virus  and/or diagnosis of COVID-19 infection under  section 564 (b)(1) of the Act, 21 U. S.C. 239PQG-2 (b) (1),  unless the authorization is terminated or revoked sooner. Patient Fact [employer]  Provider Fact [employer]    METHODOLOGY: RT PCR           Assessment and Plan:    1. Morbid obesity with BMI of 40.0-44.9, adult ([facility] [address]  Heavily counseled on the importance of therapeutic lifestyle changes through diet and exercise. The patient understands that the goal of treatment is to reach and stay at a healthy weight. The initial treatment goal is to lose at least 5-10% of her body weight in 12 weeks. This will require changes in eating habits, increased physical activity, and behavior changes. Counseled on low carb/david diet. Patient handouts and education material provided and reviewed in detail with the patient. All questions answered. Encouraged patient to keep a food journal and to bring it to her next visit. Discussed available treatment options in addition to lifestyle changes including medications or OPTIFAST. She is interested in anti-obesity medications. Contrave may be recommended at the next visit depending on her progress and lab results.  Explained that medications are most effective as part of a comprehensive treatment plan that includes nutrition, physical activity, and behavior modification. The patient also understands that she will need close follow-ups every 2-4 weeks if she starts treatment. Depending on the patient's success with these changes, she may also be a good candidate for bariatric surgery down the line. Follow-up in 2 weeks to discuss lab results and treatment plan. Patient advised that it is her responsibility to follow up on any ordered tests/lab results. Patient understands and agrees with the plan. - EKG 12 Lead; Future    2. Dietary counseling and surveillance  [id number]-Fgq/low carb meal plan. Nutrition:  [] LCHF/Ketogenic [x] Low carb/low-calorie diet [] Low-calorie diet     []Maintenance        FITTE:   [x] Cardio [] Resistance/stength exercises   [x] ACSM recommendations (150 minutes/week)           Behavior:   [x] Motivational interviewing performed    [] Referral for counseling  [x] Discussed strategies to overcome habits/challenges for focus      [x] Stress management   [x] Stimulus control  [x] Sleep hygiene      Orders Placed This Encounter   Procedures    EKG 12 Lead     Standing Status:   Future     Standing Expiration Date:   7/20/2022     Order Specific Question:   Reason for Exam?     Answer: Other       No follow-ups on file. Zabrina Argueta is a 60 y.o. female being evaluated by a Virtual Visit (video visit) encounter to address concerns as mentioned above. A caregiver was present when appropriate. Due to this being a TeleHealth encounter (During [facility] public health emergency), evaluation of the following organ systems was limited: Vitals/Constitutional/EENT/Resp/CV/GI//MS/Neuro/Skin/Heme-Lymph-Imm.   Pursuant to the emergency declaration under the [facility] [address] waiver authority and the [employer] and [employer] Act, this Virtual Visit was conducted with patient's (and/or legal guardian's) consent, to reduce the patient's risk of exposure to COVID-19 and provide necessary medical care. The patient (and/or legal guardian) has also been advised to contact this office for worsening conditions or problems, and seek emergency medical treatment and/or call 911 if deemed necessary. Services were provided through a video synchronous discussion virtually to substitute for in-person clinic visit. Patient and provider were located at their individual homes. --Monisha Goodwin MD on 7/20/2021 at 2:40 PM    An electronic signature was used to authenticate this note.

## 2021-07-21 ENCOUNTER — TELEPHONE (OUTPATIENT)
Dept: BARIATRICS/WEIGHT MGMT | Age: 56
End: 2021-07-21

## 2021-07-21 NOTE — TELEPHONE ENCOUNTER
----- Message from Ariadne Chery MD sent at 2021  2:10 PM EDT -----  Regardin-Damion/LC Meal Plan  Please call and email pt to review 1500-Damion/LC meal plan.

## 2021-07-21 NOTE — TELEPHONE ENCOUNTER
Called pt to discuss 1500 david LCMP. VM left, reviewed basis of plan. Meal plan was emailed to pt. Pt advised to call office for further review/questions.

## 2021-07-29 ENCOUNTER — OFFICE VISIT (OUTPATIENT)
Dept: FAMILY MEDICINE CLINIC | Age: 56
End: 2021-07-29
Payer: MEDICARE

## 2021-07-29 VITALS — WEIGHT: 279 LBS | BODY MASS INDEX: 43.79 KG/M2 | HEIGHT: 67 IN

## 2021-07-29 DIAGNOSIS — R73.09 ELEVATED GLUCOSE LEVEL: ICD-10-CM

## 2021-07-29 DIAGNOSIS — Z01.818 PRE-OP EXAM: Primary | ICD-10-CM

## 2021-07-29 DIAGNOSIS — I10 HYPERTENSION, ESSENTIAL: ICD-10-CM

## 2021-07-29 DIAGNOSIS — E78.2 HYPERLIPEMIA, MIXED: ICD-10-CM

## 2021-07-29 DIAGNOSIS — H26.9 CATARACT OF BOTH EYES, UNSPECIFIED CATARACT TYPE: ICD-10-CM

## 2021-07-29 DIAGNOSIS — E03.9 HYPOTHYROIDISM, UNSPECIFIED TYPE: ICD-10-CM

## 2021-07-29 LAB
A/G RATIO: 1.7 (ref 1.1–2.2)
ALBUMIN SERPL-MCNC: 4.5 G/DL (ref 3.4–5)
ALP BLD-CCNC: 129 U/L (ref 40–129)
ALT SERPL-CCNC: 24 U/L (ref 10–40)
ANION GAP SERPL CALCULATED.3IONS-SCNC: 17 MMOL/L (ref 3–16)
AST SERPL-CCNC: 20 U/L (ref 15–37)
BILIRUB SERPL-MCNC: 0.3 MG/DL (ref 0–1)
BUN BLDV-MCNC: 18 MG/DL (ref 7–20)
CALCIUM SERPL-MCNC: 9.9 MG/DL (ref 8.3–10.6)
CHLORIDE BLD-SCNC: 97 MMOL/L (ref 99–110)
CHOLESTEROL, TOTAL: 171 MG/DL (ref 0–199)
CO2: 27 MMOL/L (ref 21–32)
CREAT SERPL-MCNC: 1.2 MG/DL (ref 0.6–1.1)
GFR AFRICAN AMERICAN: 56
GFR NON-AFRICAN AMERICAN: 46
GLOBULIN: 2.7 G/DL
GLUCOSE BLD-MCNC: 120 MG/DL (ref 70–99)
HCT VFR BLD CALC: 47.4 % (ref 36–48)
HDLC SERPL-MCNC: 43 MG/DL (ref 40–60)
HEMOGLOBIN: 15.7 G/DL (ref 12–16)
IRON SATURATION: 19 % (ref 15–50)
IRON: 60 UG/DL (ref 37–145)
LDL CHOLESTEROL CALCULATED: 75 MG/DL
MCH RBC QN AUTO: 30.5 PG (ref 26–34)
MCHC RBC AUTO-ENTMCNC: 33 G/DL (ref 31–36)
MCV RBC AUTO: 92.4 FL (ref 80–100)
PDW BLD-RTO: 16.1 % (ref 12.4–15.4)
PLATELET # BLD: 169 K/UL (ref 135–450)
PMV BLD AUTO: 11.1 FL (ref 5–10.5)
POTASSIUM SERPL-SCNC: 4.2 MMOL/L (ref 3.5–5.1)
RBC # BLD: 5.13 M/UL (ref 4–5.2)
SODIUM BLD-SCNC: 141 MMOL/L (ref 136–145)
TOTAL IRON BINDING CAPACITY: 320 UG/DL (ref 260–445)
TOTAL PROTEIN: 7.2 G/DL (ref 6.4–8.2)
TRIGL SERPL-MCNC: 264 MG/DL (ref 0–150)
TSH SERPL DL<=0.05 MIU/L-ACNC: 2.92 UIU/ML (ref 0.27–4.2)
VLDLC SERPL CALC-MCNC: 53 MG/DL
WBC # BLD: 11.7 K/UL (ref 4–11)

## 2021-07-29 PROCEDURE — G8417 CALC BMI ABV UP PARAM F/U: HCPCS | Performed by: FAMILY MEDICINE

## 2021-07-29 PROCEDURE — G8427 DOCREV CUR MEDS BY ELIG CLIN: HCPCS | Performed by: FAMILY MEDICINE

## 2021-07-29 PROCEDURE — 3017F COLORECTAL CA SCREEN DOC REV: CPT | Performed by: FAMILY MEDICINE

## 2021-07-29 PROCEDURE — 93000 ELECTROCARDIOGRAM COMPLETE: CPT | Performed by: FAMILY MEDICINE

## 2021-07-29 PROCEDURE — 99214 OFFICE O/P EST MOD 30 MIN: CPT | Performed by: FAMILY MEDICINE

## 2021-07-29 PROCEDURE — 4004F PT TOBACCO SCREEN RCVD TLK: CPT | Performed by: FAMILY MEDICINE

## 2021-07-29 PROCEDURE — 36415 COLL VENOUS BLD VENIPUNCTURE: CPT | Performed by: FAMILY MEDICINE

## 2021-07-29 RX ORDER — SIMVASTATIN 40 MG
TABLET ORAL
Qty: 30 TABLET | Refills: 5 | Status: SHIPPED | OUTPATIENT
Start: 2021-07-29 | End: 2022-01-04

## 2021-07-29 ASSESSMENT — ENCOUNTER SYMPTOMS
GASTROINTESTINAL NEGATIVE: 1
RESPIRATORY NEGATIVE: 1

## 2021-07-29 NOTE — PROGRESS NOTES
OUTPATIENT PROGRESS NOTE  Date of Service:  7/29/2021  Address[address] [address] [place], [place] [address] [address] [facility],First [address] [address]  Dept: [phone]  Loc: [phone]    Subjective:      Patient ID:  [id number]  Killian Kapoor is a 60 y.o. female     HPI  8/18/21 left   [facility]       Review of Systems   Constitutional: Negative. Respiratory: Negative. Cardiovascular: Negative. Gastrointestinal: Negative. Skin: Negative. Neurological: Negative.         Objective:   YOB: 1965    Date of Visit:  7/29/2021       Allergies   Allergen Reactions    Glucosamine Chondroitin Complx [Glucosamine-Chondroitin] Swelling     \"Due to sulfa allergy\" per pt    Sulfa Antibiotics Itching, Swelling and Other (See Comments)     Pt states gets really hot like she is on fire,and swelling of face,hands, and feet    Erythromycin Swelling       Outpatient Medications Marked as Taking for the 7/29/21 encounter (Office Visit) with Neilda Ray, DO   Medication Sig Dispense Refill    oxybutynin (DITROPAN-XL) 10 MG extended release tablet TAKE ONE TABLET BY MOUTH DAILY 30 tablet 0    simvastatin (ZOCOR) 20 MG tablet TAKE ONE TABLET BY MOUTH ONCE NIGHTLY 30 tablet 0    triamterene-hydroCHLOROthiazide (MAXZIDE) 75-50 MG per tablet Take 1 tablet by mouth daily 30 tablet 5    hydroxychloroquine (PLAQUENIL) 200 MG tablet TAKE ONE TABLET BY MOUTH TWICE A DAY 60 tablet 4    naproxen (NAPROSYN) 500 MG tablet Take 1 tablet by mouth 2 times daily (with meals) 60 tablet 5    levothyroxine (SYNTHROID) 300 MCG tablet TAKE ONE TABLET BY MOUTH DAILY 90 tablet 1    vitamin D (CHOLECALCIFEROL) 1000 UNIT TABS tablet Take 1,000 Units by mouth daily      vitamin B-12 (CYANOCOBALAMIN) 1000 MCG tablet Take 1,000 mcg by mouth daily      Biotin 33696 MCG TABS Take by mouth         Vitals:    07/29/21 1011   Weight: 279 lb (126.6 kg) Height: 5' 7\" (1.702 m)     Body mass index is 43.7 kg/m². Wt Readings from Last 3 Encounters:   07/29/21 279 lb (126.6 kg)   07/08/21 282 lb (127.9 kg)   06/03/21 283 lb (128.4 kg)     BP Readings from Last 3 Encounters:   07/08/21 122/78   06/11/21 122/80   06/03/21 116/79     Allergies   Allergen Reactions    Glucosamine Chondroitin Complx [Glucosamine-Chondroitin] Swelling     \"Due to sulfa allergy\" per pt    Sulfa Antibiotics Itching, Swelling and Other (See Comments)     Pt states gets really hot like she is on fire,and swelling of face,hands, and feet    Erythromycin Swelling     Current Outpatient Medications   Medication Sig Dispense Refill    oxybutynin (DITROPAN-XL) 10 MG extended release tablet TAKE ONE TABLET BY MOUTH DAILY 30 tablet 0    simvastatin (ZOCOR) 20 MG tablet TAKE ONE TABLET BY MOUTH ONCE NIGHTLY 30 tablet 0    triamterene-hydroCHLOROthiazide (MAXZIDE) 75-50 MG per tablet Take 1 tablet by mouth daily 30 tablet 5    hydroxychloroquine (PLAQUENIL) 200 MG tablet TAKE ONE TABLET BY MOUTH TWICE A DAY 60 tablet 4    naproxen (NAPROSYN) 500 MG tablet Take 1 tablet by mouth 2 times daily (with meals) 60 tablet 5    levothyroxine (SYNTHROID) 300 MCG tablet TAKE ONE TABLET BY MOUTH DAILY 90 tablet 1    vitamin D (CHOLECALCIFEROL) 1000 UNIT TABS tablet Take 1,000 Units by mouth daily      vitamin B-12 (CYANOCOBALAMIN) 1000 MCG tablet Take 1,000 mcg by mouth daily      Biotin 58788 MCG TABS Take by mouth      folic acid (FOLVITE) 544 MCG tablet Take 400 mcg by mouth daily (Patient not taking: Reported on 7/29/2021)       No current facility-administered medications for this visit.      Past Medical History:   Diagnosis Date    Anemia     Anesthesia complication     severe headache after woke up after wisdom teeth    Anxiety and depression     Chronic back pain     Condyloma acuminatum     Headache(784.0)     Hyperlipemia, mixed     Hypertension     Hypertension, essential     Hypothyroidism     Hypothyroidism     IBS (irritable bowel syndrome)     Metrorrhagia     Morbid obesity with BMI of 40.0-44.9, adult (HCC)     Neuropathy     Obstructive sleep apnea, adult     cpap    Osteoarthritis     Pre-operative clearance     Urgency of urination     Wears glasses     driving at night/reading     Past Surgical History:   Procedure Laterality Date    HIP SURGERY  2016, 2020    LEEP      PAIN MANAGEMENT PROCEDURE Left 3/12/2021    LUMBAR EPIDURAL STEROID INJECTION LEFT L4-5 performed by Lawrence Doyle MD at [address] Left 08/16/2016    TOTAL HIP ARTHROPLASTY Right 11/16/2020    RIGHT LATERAL TOTAL HIP REPLACEMENT performed by Cornelia Perez MD at [address] [facility]       Social History     Tobacco Use    Smoking status: Current Every Day Smoker     Packs/day: 1.00     Years: 30.00     Pack years: 30.00     Types: Cigarettes    Smokeless tobacco: Never Used    Tobacco comment: encouraged to stop smoking    Vaping Use    Vaping Use: Never used   Substance Use Topics    Alcohol use: Yes     Alcohol/week: 0.0 standard drinks     Comment: rare    Drug use: Never     Family History   Problem Relation Age of Onset    Diabetes Other     High Blood Pressure Other     Stroke Other     Cancer Other         Bone    Alcohol Abuse Other     Heart Disease Mother         cardiac arrhythmia    High Blood Pressure Mother     Thyroid Disease Mother     Hypertension Mother     Cancer Mother     Other Mother         blood clots    Migraines Mother     Arthritis Mother     Arthritis Father     Heart Disease Father         cabg    Diabetes Father     Stroke Father     Hypertension Father     Other Father         blood clots    Cancer Sister         breast cancer    Cancer Brother         lung       Physical Exam  Vitals and nursing note reviewed. Constitutional:       Appearance: She is well-developed.    HENT: Head: Normocephalic. Neck:      Thyroid: No thyromegaly. Cardiovascular:      Rate and Rhythm: Normal rate and regular rhythm. Heart sounds: Normal heart sounds. Pulmonary:      Effort: Pulmonary effort is normal.      Breath sounds: Normal breath sounds. Lymphadenopathy:      Cervical: No cervical adenopathy. Neurological:      Mental Status: She is alert and oriented to person, place, and time. Psychiatric:         Behavior: Behavior normal.         Thought Content:  Thought content normal.         Judgment: Judgment normal.            Assessment/Plan             Assessment/plan;  Minemarilee Eduardoher was seen today for pre-op exam.    Diagnoses and all orders for this visit:    Pre-op exam  -     EKG 12 Lead    Cataract of both eyes, unspecified cataract type      Pt medically clear for surgery             Christina Alvarez DO

## 2021-07-30 ENCOUNTER — TELEPHONE (OUTPATIENT)
Dept: FAMILY MEDICINE CLINIC | Age: 56
End: 2021-07-30

## 2021-07-30 LAB
ESTIMATED AVERAGE GLUCOSE: 159.9 MG/DL
HBA1C MFR BLD: 7.2 %

## 2021-07-30 NOTE — TELEPHONE ENCOUNTER
Pt would like a return david about her blood work. She was given the results from Dr Vasile Chao but have more questions.  Please give pt a call [phone]

## 2021-08-04 NOTE — TELEPHONE ENCOUNTER
Spoke to pt, went over blood test results with her and result message, she stated that Dr Alex Ramírez put her on naproxen but told her that \"we will have to watch your kidneys\"   pt stated after seeing blood test results she went from 2 tablets a day down to one.  She would like to know what she should do, should she reach out to Dr Alex Ramírez or wait, she doesn't see her again for a year

## 2021-08-06 ENCOUNTER — TELEPHONE (OUTPATIENT)
Dept: FAMILY MEDICINE CLINIC | Age: 56
End: 2021-08-06

## 2021-08-06 RX ORDER — HYDROCHLOROTHIAZIDE 25 MG/1
25 TABLET ORAL EVERY MORNING
Qty: 30 TABLET | Refills: 5 | Status: SHIPPED | OUTPATIENT
Start: 2021-08-06 | End: 2022-01-04

## 2021-08-06 NOTE — TELEPHONE ENCOUNTER
Patient called stating since stopping maxzide and decreasing naproxen per Dr Liliana Milton the patient states she has bilateral leg swelling from calf down. Patient also states hand swelling. Patient states swelling started yesterday. Feet so swollen painful to walk. Patient unsure of weight gain. Patient denies any other symptoms.  Patient states ok to LM

## 2021-08-08 DIAGNOSIS — N39.3 STRESS INCONTINENCE: ICD-10-CM

## 2021-08-08 RX ORDER — OXYBUTYNIN CHLORIDE 10 MG/1
TABLET, EXTENDED RELEASE ORAL
Qty: 30 TABLET | Refills: 0 | Status: SHIPPED | OUTPATIENT
Start: 2021-08-08 | End: 2021-10-21

## 2021-08-08 RX ORDER — SIMVASTATIN 20 MG
TABLET ORAL
Qty: 30 TABLET | Refills: 0 | Status: SHIPPED | OUTPATIENT
Start: 2021-08-08 | End: 2022-09-08 | Stop reason: DRUGHIGH

## 2021-08-12 ENCOUNTER — TELEPHONE (OUTPATIENT)
Dept: FAMILY MEDICINE CLINIC | Age: 56
End: 2021-08-12

## 2021-08-12 NOTE — TELEPHONE ENCOUNTER
[facility] is needing patient OV notes, physical notes, and EKG to clear for surgery.      Ph. [phone]  [facility]:[phone]

## 2021-08-26 ENCOUNTER — OFFICE VISIT (OUTPATIENT)
Dept: FAMILY MEDICINE CLINIC | Age: 56
End: 2021-08-26
Payer: MEDICARE

## 2021-08-26 VITALS — DIASTOLIC BLOOD PRESSURE: 82 MMHG | SYSTOLIC BLOOD PRESSURE: 144 MMHG

## 2021-08-26 DIAGNOSIS — R20.2 ARM PARESTHESIA, LEFT: Primary | ICD-10-CM

## 2021-08-26 LAB
A/G RATIO: 1.7 (ref 1.1–2.2)
ALBUMIN SERPL-MCNC: 4.5 G/DL (ref 3.4–5)
ALP BLD-CCNC: 114 U/L (ref 40–129)
ALT SERPL-CCNC: 21 U/L (ref 10–40)
ANION GAP SERPL CALCULATED.3IONS-SCNC: 17 MMOL/L (ref 3–16)
AST SERPL-CCNC: 17 U/L (ref 15–37)
BILIRUB SERPL-MCNC: 0.3 MG/DL (ref 0–1)
BUN BLDV-MCNC: 17 MG/DL (ref 7–20)
CALCIUM SERPL-MCNC: 9.6 MG/DL (ref 8.3–10.6)
CHLORIDE BLD-SCNC: 96 MMOL/L (ref 99–110)
CO2: 28 MMOL/L (ref 21–32)
CREAT SERPL-MCNC: 1.1 MG/DL (ref 0.6–1.1)
GFR AFRICAN AMERICAN: >60
GFR NON-AFRICAN AMERICAN: 51
GLOBULIN: 2.6 G/DL
GLUCOSE BLD-MCNC: 127 MG/DL (ref 70–99)
POTASSIUM SERPL-SCNC: 4 MMOL/L (ref 3.5–5.1)
SODIUM BLD-SCNC: 141 MMOL/L (ref 136–145)
TOTAL PROTEIN: 7.1 G/DL (ref 6.4–8.2)
TROPONIN: <0.01 NG/ML

## 2021-08-26 PROCEDURE — 3017F COLORECTAL CA SCREEN DOC REV: CPT | Performed by: FAMILY MEDICINE

## 2021-08-26 PROCEDURE — G8427 DOCREV CUR MEDS BY ELIG CLIN: HCPCS | Performed by: FAMILY MEDICINE

## 2021-08-26 PROCEDURE — 36415 COLL VENOUS BLD VENIPUNCTURE: CPT | Performed by: FAMILY MEDICINE

## 2021-08-26 PROCEDURE — G8417 CALC BMI ABV UP PARAM F/U: HCPCS | Performed by: FAMILY MEDICINE

## 2021-08-26 PROCEDURE — 4004F PT TOBACCO SCREEN RCVD TLK: CPT | Performed by: FAMILY MEDICINE

## 2021-08-26 PROCEDURE — 93000 ELECTROCARDIOGRAM COMPLETE: CPT | Performed by: FAMILY MEDICINE

## 2021-08-26 PROCEDURE — 99214 OFFICE O/P EST MOD 30 MIN: CPT | Performed by: FAMILY MEDICINE

## 2021-08-26 ASSESSMENT — ENCOUNTER SYMPTOMS
GASTROINTESTINAL NEGATIVE: 1
RESPIRATORY NEGATIVE: 1

## 2021-08-26 NOTE — PROGRESS NOTES
OUTPATIENT PROGRESS NOTE  Date of Service:  8/26/2021  Address[address]  [address] [facility] [address] [address]  Dept: [phone]  Loc: [phone]    Subjective:      Patient ID:  [id number]  Nico Powell is a 60 y.o. female     HPI   Left upper arm and shoulder with numbness and tingling  Started last week  Starts on chest wall near ant shoulder and goes down left arm to just above elbow  Constant but gets more intense and backs off  Not related to activity  She has been moving totes due to moving   No chest pain  Not feeling sob   No weakness in her arm  Hurts if she puts her arm behind her back    Review of Systems   Constitutional: Negative. HENT: Negative. Respiratory: Negative. Cardiovascular: Negative. Gastrointestinal: Negative. Neurological: Positive for numbness. Psychiatric/Behavioral: Positive for decreased concentration. Objective:   YOB: 1965    Date of Visit:  8/26/2021       Allergies   Allergen Reactions    Glucosamine Chondroitin Complx [Glucosamine-Chondroitin] Swelling     \"Due to sulfa allergy\" per pt    Sulfa Antibiotics Itching, Swelling and Other (See Comments)     Pt states gets really hot like she is on fire,and swelling of face,hands, and feet    Erythromycin Swelling       No outpatient medications have been marked as taking for the 8/26/21 encounter (Office Visit) with Elliott Diaz DO. Vitals:    08/26/21 0827   BP: (!) 144/82   Site: Left Upper Arm   Position: Sitting   Cuff Size: Large Adult     There is no height or weight on file to calculate BMI. Wt Readings from Last 3 Encounters:   07/29/21 279 lb (126.6 kg)   07/08/21 282 lb (127.9 kg)   06/03/21 283 lb (128.4 kg)     BP Readings from Last 3 Encounters:   08/26/21 (!) 144/82   07/08/21 122/78   06/11/21 122/80       Physical Exam  Constitutional:       General: She is not in acute distress. Appearance: She is well-developed. HENT:      Head: Normocephalic. Cardiovascular:      Rate and Rhythm: Normal rate and regular rhythm. Pulses: Normal pulses. Heart sounds: Normal heart sounds. Pulmonary:      Effort: Pulmonary effort is normal.      Breath sounds: Normal breath sounds. Musculoskeletal:      Comments: Pain with ext ext rotation  No motor of sensory losses  Tingling follows the biceps tendon down arm   Neurological:      Mental Status: She is alert and oriented to person, place, and time. Psychiatric:         Behavior: Behavior normal.         Thought Content: Thought content normal.         Judgment: Judgment normal.          EKG stable  No changes from last check  Assessment/Plan       Sidney Gilmore was seen today for numbness and other.     Diagnoses and all orders for this visit:    Arm paresthesia, left    Other orders  -     EKG 12 Lead  -     Comprehensive Metabolic Panel  -     TROPONIN                        LILLIANA SAUL DO

## 2021-08-26 NOTE — PROGRESS NOTES
Pt was in office to see Dr Dionna Christianson, blood work orders were placed. I drew one sst  tube from Rt arm without complications.     -Joe Perez

## 2021-10-21 DIAGNOSIS — N39.3 STRESS INCONTINENCE: ICD-10-CM

## 2021-10-21 RX ORDER — OXYBUTYNIN CHLORIDE 10 MG/1
TABLET, EXTENDED RELEASE ORAL
Qty: 30 TABLET | Refills: 0 | Status: SHIPPED | OUTPATIENT
Start: 2021-10-21 | End: 2021-10-22

## 2021-10-22 DIAGNOSIS — N39.3 STRESS INCONTINENCE: ICD-10-CM

## 2021-10-22 RX ORDER — OXYBUTYNIN CHLORIDE 10 MG/1
TABLET, EXTENDED RELEASE ORAL
Qty: 30 TABLET | Refills: 0 | Status: SHIPPED | OUTPATIENT
Start: 2021-10-22 | End: 2022-01-26

## 2021-10-25 RX ORDER — LEVOTHYROXINE SODIUM 300 UG/1
TABLET ORAL
Qty: 90 TABLET | Refills: 1 | Status: SHIPPED | OUTPATIENT
Start: 2021-10-25 | End: 2022-03-02

## 2021-12-28 DIAGNOSIS — M19.90 INFLAMMATORY ARTHRITIS: ICD-10-CM

## 2021-12-29 RX ORDER — HYDROXYCHLOROQUINE SULFATE 200 MG/1
TABLET, FILM COATED ORAL
Qty: 60 TABLET | Refills: 0 | Status: SHIPPED | OUTPATIENT
Start: 2021-12-29 | End: 2022-04-28 | Stop reason: SDUPTHER

## 2022-01-04 DIAGNOSIS — M19.90 INFLAMMATORY ARTHRITIS: ICD-10-CM

## 2022-01-04 RX ORDER — HYDROCHLOROTHIAZIDE 25 MG/1
TABLET ORAL
Qty: 30 TABLET | Refills: 5 | Status: SHIPPED | OUTPATIENT
Start: 2022-01-04 | End: 2022-10-06

## 2022-01-04 RX ORDER — HYDROXYCHLOROQUINE SULFATE 200 MG/1
TABLET, FILM COATED ORAL
Qty: 180 TABLET | OUTPATIENT
Start: 2022-01-04

## 2022-01-04 RX ORDER — SIMVASTATIN 40 MG
TABLET ORAL
Qty: 30 TABLET | Refills: 5 | Status: SHIPPED | OUTPATIENT
Start: 2022-01-04 | End: 2022-10-06

## 2022-01-26 DIAGNOSIS — N39.3 STRESS INCONTINENCE: ICD-10-CM

## 2022-01-26 DIAGNOSIS — M19.90 INFLAMMATORY ARTHRITIS: ICD-10-CM

## 2022-01-26 RX ORDER — OXYBUTYNIN CHLORIDE 10 MG/1
TABLET, EXTENDED RELEASE ORAL
Qty: 30 TABLET | Refills: 0 | Status: SHIPPED | OUTPATIENT
Start: 2022-01-26 | End: 2022-02-07

## 2022-01-26 RX ORDER — HYDROXYCHLOROQUINE SULFATE 200 MG/1
TABLET, FILM COATED ORAL
Qty: 180 TABLET | OUTPATIENT
Start: 2022-01-26

## 2022-01-27 NOTE — PLAN OF CARE
[facility]. [address] [place] [address]  Phone: [phone]   Fax:     [phone]                                                       Physical Therapy Certification    Dear Referring Practitioner: Dr. Gustabo Wilkes,    We had the pleasure of evaluating the following patient for physical therapy services at [facility]. A summary of our findings can be found in the initial assessment below. This includes our plan of care. If you have any questions or concerns regarding these findings, please do not hesitate to contact me at the office phone number checked above. Thank you for the referral.       Physician Signature:_______________________________Date:__________________  By signing above (or electronic signature), therapists plan is approved by physician              Patient: Killian Alcantara   : 1965   MRN: [id number]  Referring Physician: Referring Practitioner: Dr. Gustabo Wilkes      Evaluation Date: 2020      Medical Diagnosis Information:      Treatment Diagnosis: decreased abilty to ambulate and function                                         Insurance information: PT Insurance Information: Medicare     Precautions/ Contra-indications: ant hip precautions  Latex Allergy:  [x]NO      []YES  Preferred Language for Healthcare:   [x]English       []other:    C-SSRS Triggered by Intake questionnaire (Past 2 wk assessment ):   [x] No, Questionnaire did not trigger screening.   [] Yes, Patient intake triggered C-SSRS Screening      [] C-SSRS Screening completed  [] PCP notified via Epic     SUBJECTIVE: Pt is a 61 y/o female with a hx of right hip pain for a few years with a resultant right jamey on 20  . She was seen at home for a few weeks. She now c/o constant aching pain in her hip and le which is sharper on occasion.   She also started having  sharper pain in her lb today and down her right le to her foot which is a 10 /10 today. She has tried walking without the cane or walker recently. She is mainly walking with a cane. She wakes due to pain. She hopes to decrease pain and resume normal activities. She also has a left tka from 4 years ago. Her goal is to walk normal    Relevant Medical History:Additional Pertinent Hx: anxiety, chronic back pain, depression, OA  Functional Outcome Measure: LEFS = 18    Pain Scale: 10/10  Easing factors: nothing  Provocative factors: everything     Type: [x]Constant   []Intermittent  []Radiating []Localized []other:     Numbness/Tingling: down leg    Occupation/School:not at the moment    Living Status/Prior Level of Function: Independent with ADLs and IADLs,     OBJECTIVE:     ROM LEFT RIGHT   HIP Flex wfl 100   HIP Abd  20   HIP Ext  0   HIP IR  0   HIP ER  20   Knee ext  120   Knee Flex  0   Ankle PF  40   Ankle DF  8   Ankle In     Ankle Ev     Strength  LEFT RIGHT   HIP Flexors  3   HIP Abductors  4--   HIP Ext  4-   Hip ER  4-   Knee EXT (quad)  4-   Knee Flex (HS)  4-   Ankle DF  5   Ankle PF  5   Ankle Inv  5   Ankle EV  5                     Reflexes/Sensation:    [x]Dermatomes/Myotomes intact    [x]Reflexes equal and normal bilaterally   []Other:      Joint mobility:    []Normal    [x]Hypo   []Hyper    Palpation: tight and tender in right gluts, piriformis, itb., add,     Functional Mobility/Transfers: ind    Posture: ant tilt, increased lumbar lordosis, poor ns    Bandages/Dressings/Incisions: she says it is still healing, no redness, no sighns of infection    Gait: (include devices/WB status) ambulates with a straight cane with moderate limp, decreased heel strike, stance, and push off, told her to get back on the walker at this point, gait with cane not gooe enough    Orthopedic Special Tests: na                       [x] Patient history, allergies, meds reviewed. Medical chart reviewed. See intake form.      Review Of Systems (ROS):  [x]Performed Review of systems (Integumentary, CardioPulmonary, Neurological) by intake and observation. Intake form has been scanned into medical record. Patient has been instructed to contact their primary care physician regarding ROS issues if not already being addressed at this time. Co-morbidities/Complexities (which will affect course of rehabilitation):   []None           Arthritic conditions   []Rheumatoid arthritis (M05.9)  [x]Osteoarthritis (M19.91)   Cardiovascular conditions   []Hypertension (I10)  []Hyperlipidemia (E78.5)  []Angina pectoris (I20)  []Atherosclerosis (I70)  []CVA Musculoskeletal conditions   [x]Disc pathology   []Congenital spine pathologies   []Prior surgical intervention  []Osteoporosis (M81.8)  []Osteopenia (M85.8)   Endocrine conditions   []Hypothyroid (E03.9)  []Hyperthyroid Gastrointestinal conditions   []Constipation (Y96.66)   Metabolic conditions   []Morbid obesity (E66.01)  []Diabetes type 1(E10.65) or 2 (E11.65)   []Neuropathy (G60.9)     Pulmonary conditions   []Asthma (J45)  []Coughing   []COPD (J44.9)   Psychological Disorders  [x]Anxiety (F41.9)  [x]Depression (F32.9)   []Other:   [x]Other:     anemia     Barriers to/and or personal factors that will affect rehab potential:              []Age  []Sex    []Smoker              []Motivation/Lack of Motivation                        [x]Co-Morbidities              []Cognitive Function, education/learning barriers              []Environmental, home barriers              []profession/work barriers  [x]past PT/medical experience  []other:      Falls Risk Assessment (30 days):   [x] Falls Risk assessed and no intervention required.   [] Falls Risk assessed and Patient requires intervention due to being higher risk   TUG score (>12s at risk):     [] Falls education provided, including         ASSESSMENT:   Functional Impairments:     [x]Noted lumbar/proximal hip/LE hypomobility   []Decreased LE functional ROM   []Decreased core/proximal hip strength and neuromuscular control   []Decreased LE functional strength   []Reduced balance/proprioceptive control   []other:      Functional Activity Limitations (from functional questionnaire and intake)   []Reduced ability to tolerate prolonged functional positions   []Reduced ability or difficulty with changes of positions or transfers between positions   [x]Reduced ability to maintain good posture and demonstrate good body mechanics with sitting, bending, and lifting   [x]Reduced ability to sleep   [x] Reduced ability or tolerance with driving and/or computer work   [x]Reduced ability to perform lifting, carrying tasks   [x]Reduced ability to squat   [x]Reduced ability to forward bend   [x]Reduced ability to ambulate prolonged functional periods/distances/surfaces   [x]Reduced ability to ascend/descend stairs   [x]Reduced ability to run, hop or jump   []other:     Participation Restrictions   [x]Reduced participation in self care activities   [x]Reduced participation in home management activities   [x]Reduced participation in work activities   [x]Reduced participation in social activities. []Reduced participation in sport activities. Classification :    [x]Signs/symptoms consistent with post-surgical status including decreased ROM, strength and function.    []Signs/symptoms consistent with joint sprain/strain   []Signs/symptoms consistent with patella-femoral syndrome   []Signs/symptoms consistent with knee OA/hip OA   []Signs/symptoms consistent with internal derangement of knee/Hip   []Signs/symptoms consistent with functional hip weakness/NMR control      []Signs/symptoms consistent with tendinitis/tendinosis    []signs/symptoms consistent with pathology which may benefit from Dry needling      []other:      Prognosis/Rehab Potential:      []Excellent   [x]Good    []Fair   []Poor    Tolerance of evaluation/treatment: []Excellent   [x]Good    []Fair   []Poor    Physical Therapy Evaluation Complexity Justification  [x] A history of present problem with:  [] no personal factors and/or comorbidities that impact the plan of care;  []1-2 personal factors and/or comorbidities that impact the plan of care  [x]3 personal factors and/or comorbidities that impact the plan of care  [x] An examination of body systems using standardized tests and measures addressing any of the following: body structures and functions (impairments), activity limitations, and/or participation restrictions;:  [] a total of 1-2 or more elements   [] a total of 3 or more elements   [x] a total of 4 or more elements   [x] A clinical presentation with:  [] stable and/or uncomplicated characteristics   [x] evolving clinical presentation with changing characteristics  [] unstable and unpredictable characteristics;   [x] Clinical decision making of [] low, [] moderate, [] high complexity using standardized patient assessment instrument and/or measurable assessment of functional outcome. [x] EVAL (LOW) 36181 (typically 20 minutes face-to-face)  [] EVAL (MOD) 10012 (typically 30 minutes face-to-face)  [] EVAL (HIGH) 90567 (typically 45 minutes face-to-face)  [] RE-EVAL     PLAN:  Frequency/Duration:  2 days per week for 6-8 Weeks:  Interventions:  []  Therapeutic exercise including: strength training, ROM, for Lower extremity and core   []  NMR activation and proprioception for LE, Glutes and Core   []  Manual therapy as indicated for LE, Hip and spine to include: Dry Needling/IASTM, STM, PROM, Gr I-IV mobilizations, manipulation. [] Modalities as needed that may include: thermal agents, E-stim, Biofeedback, US, iontophoresis as indicated  [] Patient education on joint protection, postural re-education, activity modification, progression of HEP.     HEP instruction: see daily note    GOALS:  Patient stated goal: \" I want to be able to walk normal \"  [] Progressing: [] Met: [] Not Met: [] Adjusted    Therapist goals for Patient:   Short Term Goals: To be achieved in: 4weeks  1. Independent in HEP and progression per patient tolerance, in order to prevent re-injury. [] Progressing: [] Met: [] Not Met: [] Adjusted  2. Patient will have a decrease in pain to facilitate improvement in movement, function, and ADLs as indicated by Functional Deficits. [] Progressing: [] Met: [] Not Met: [] Adjusted    Long Term Goals: To be achieved in: 8 weeks    [] Progressing: [] Met: [] Not Met: [] Adjusted  2. Patient will demonstrate increased AROM to normal for jamey to allow for proper joint functioning as indicated by patients Functional Deficits. [] Progressing: [] Met: [] Not Met: [] Adjusted  3. Patient will demonstrate an increase in Strength to good proximal hip strength and control, within 5lb HHD in LE to allow for proper functional mobility as indicated by patients Functional Deficits. [] Progressing: [] Met: [] Not Met: [] Adjusted  4. Patient will return to 75%functional activities without increased symptoms or restriction. [] Progressing: [] Met: [] Not Met: [] Adjusted  5. (patient specific functional goal)    [] Progressing: [] Met: [] Not Met: [] Adjusted     Electronically signed by:  Cristian Duffy PT      Note: If patient does not return for scheduled/recommended follow up visits, this note will serve as a discharge from care along with the most recent update on progress. NONE

## 2022-02-04 ENCOUNTER — VIRTUAL VISIT (OUTPATIENT)
Dept: FAMILY MEDICINE CLINIC | Age: 57
End: 2022-02-04
Payer: MEDICARE

## 2022-02-04 DIAGNOSIS — M54.31 BILATERAL SCIATICA: Primary | ICD-10-CM

## 2022-02-04 DIAGNOSIS — M54.32 BILATERAL SCIATICA: Primary | ICD-10-CM

## 2022-02-04 PROCEDURE — 99213 OFFICE O/P EST LOW 20 MIN: CPT | Performed by: FAMILY MEDICINE

## 2022-02-04 PROCEDURE — 3017F COLORECTAL CA SCREEN DOC REV: CPT | Performed by: FAMILY MEDICINE

## 2022-02-04 PROCEDURE — G8428 CUR MEDS NOT DOCUMENT: HCPCS | Performed by: FAMILY MEDICINE

## 2022-02-04 RX ORDER — CYCLOBENZAPRINE HCL 10 MG
10 TABLET ORAL 3 TIMES DAILY PRN
Qty: 30 TABLET | Refills: 1 | Status: SHIPPED | OUTPATIENT
Start: 2022-02-04 | End: 2022-02-14

## 2022-02-04 RX ORDER — METHYLPREDNISOLONE 4 MG/1
TABLET ORAL
Qty: 1 KIT | Refills: 0 | Status: SHIPPED | OUTPATIENT
Start: 2022-02-04 | End: 2022-02-10

## 2022-02-04 ASSESSMENT — PATIENT HEALTH QUESTIONNAIRE - PHQ9
2. FEELING DOWN, DEPRESSED OR HOPELESS: 0
1. LITTLE INTEREST OR PLEASURE IN DOING THINGS: 0
SUM OF ALL RESPONSES TO PHQ QUESTIONS 1-9: 0
SUM OF ALL RESPONSES TO PHQ QUESTIONS 1-9: 0
SUM OF ALL RESPONSES TO PHQ9 QUESTIONS 1 & 2: 0
SUM OF ALL RESPONSES TO PHQ QUESTIONS 1-9: 0
SUM OF ALL RESPONSES TO PHQ QUESTIONS 1-9: 0

## 2022-02-04 ASSESSMENT — ENCOUNTER SYMPTOMS
GASTROINTESTINAL NEGATIVE: 1
RESPIRATORY NEGATIVE: 1
BACK PAIN: 1

## 2022-02-04 NOTE — PROGRESS NOTES
Marian West (:  1965) is a Established patient, here for evaluation of the following:      Assessment/plan;  Shayna Sky was seen today for back pain. Diagnoses and all orders for this visit:    Bilateral sciatica  -     methylPREDNISolone (MEDROL DOSEPACK) 4 MG tablet; Take by mouth. -     cyclobenzaprine (FLEXERIL) 10 MG tablet; Take 1 tablet by mouth 3 times daily as needed for Muscle spasms      Heat and rest     Call if not feeling better next week    Subjective   HPI   Chronic low back pain  Has had epidural injections   Last time last year  They did help  Now pain is back and shooting down legs  Has called specialist with no response   Review of Systems   Constitutional: Negative. HENT: Negative. Respiratory: Negative. Cardiovascular: Negative. Gastrointestinal: Negative. Musculoskeletal: Positive for arthralgias, back pain and myalgias. Neurological: Negative. Psychiatric/Behavioral: Positive for decreased concentration. Objective   Patient-Reported Vitals  No data recorded     Physical Exam  Constitutional:       General: She is not in acute distress. Appearance: She is well-developed. HENT:      Head: Normocephalic. Neurological:      Mental Status: She is alert and oriented to person, place, and time. Psychiatric:         Behavior: Behavior normal.         Thought Content: Thought content normal.         Judgment: Judgment normal.           Shania Brumfield, was evaluated through a synchronous (real-time) audio-video encounter. The patient (or guardian if applicable) is aware that this is a billable service, which includes applicable co-pays. This Virtual Visit was conducted with patient's (and/or legal guardian's) consent. The visit was conducted pursuant to the emergency declaration under the [address] [place] [address], [address] [place] [address] authority and the [place] [employer] Act.   Patient identification was verified, and a caregiver was present when appropriate. The patient was located at home in a state where the provider was licensed to provide care.        --Arnold Matias, DO

## 2022-02-07 DIAGNOSIS — N39.3 STRESS INCONTINENCE: ICD-10-CM

## 2022-02-07 RX ORDER — OXYBUTYNIN CHLORIDE 10 MG/1
TABLET, EXTENDED RELEASE ORAL
Qty: 30 TABLET | Refills: 0 | Status: SHIPPED | OUTPATIENT
Start: 2022-02-07 | End: 2022-04-13

## 2022-02-17 NOTE — PROGRESS NOTES
[facility] ENDOSCOPY AND OUTPATIENT  PRE-PROCEDURE INSTRUCTIONS    Procedure date__2/18/22_______  Arrival time____0730________          Procedure time__0830__________       It is not necessary to stop eating or drinking prior to this procedure. We would like you to take your medications for blood pressure as usual.  You may be asked to stop blood thinners such as Coumadin, Plavix, Fragmin, Lovenox, etc., or any anti-inflammatories such as:  Aspirin, Ibuprofen, Advil, Naproxen prior to your procedure. We also ask that you stop any OTC medications that cause additional bleeding    You must make arrangements for a responsible adult to arrive with you and stay in our waiting area during your procedure. They will also need to take you home after your procedure. For your safety you will not be allowed to leave alone or drive yourself home. Also for your safety, it is strongly suggested that someone stay with you the first 24 hours after your procedure. For your comfort, please wear simple loose fitting clothing to the center. Please do not bring valuables. If you have a living will and a durable power of  for healthcare, please bring in a copy. You will need to bring a photo ID and insurance card    Our goal is to provide you with excellent care so if you have any questions, please contact us at the [facility] at [phone]         Please note these are generalized instructions for all EGD cases, you may be provided with more specific instructions if necessaryPreoperative Screening for Elective Surgery/Invasive Procedures While COVID-19 present in the community     Have you had any of the following symptoms?   o Fever, chills  o Cough  o Shortness of breath  o Muscle aches/pain  o Diarrhea  o Abdominal pain, nausea, vomiting  o Loss or decrease in taste and / or smell   Risk of Exposure  o Have you recently been hospitalized for COVID-19 or flu-like illness, if so when?  o Recently diagnosed with COVID-19, if so when?  o Recently tested for COVID-19, if so when?  o Have you been in close contact with a person or family member who currently has or recently had COVID-23? If yes, when and in what context?  o Do you live with anybody who in the last 14 days has had fever, chills, shortness of breath, muscle aches, flu-like illness?  o Do you have any close contacts or family members who are currently in the hospital for COVID-19 or flu-like illness? If yes, assess recent close contact with this person. Indicate if the patient has a positive screen by answering yes to one or more of the above questions. Patients who test positive or screen positive prior to surgery or on the day of surgery should be evaluated in conjunction with the surgeon/proceduralist/anesthesiologist to determine the urgency of the procedure. No to all above questions. Patient has ride issues. She will drive herself here, and someone will come to pick her and her vehicle up at end time.

## 2022-02-18 ENCOUNTER — APPOINTMENT (OUTPATIENT)
Dept: INTERVENTIONAL RADIOLOGY/VASCULAR | Age: 57
End: 2022-02-18
Attending: ANESTHESIOLOGY
Payer: MEDICARE

## 2022-02-18 ENCOUNTER — HOSPITAL ENCOUNTER (OUTPATIENT)
Age: 57
Setting detail: OUTPATIENT SURGERY
Discharge: HOME | End: 2022-02-18
Attending: ANESTHESIOLOGY | Admitting: ANESTHESIOLOGY
Payer: MEDICARE

## 2022-02-18 VITALS
SYSTOLIC BLOOD PRESSURE: 144 MMHG | RESPIRATION RATE: 16 BRPM | WEIGHT: 279 LBS | BODY MASS INDEX: 43.79 KG/M2 | TEMPERATURE: 96.6 F | DIASTOLIC BLOOD PRESSURE: 81 MMHG | HEIGHT: 67 IN | OXYGEN SATURATION: 98 % | HEART RATE: 73 BPM

## 2022-02-18 PROCEDURE — 2500000003 HC RX 250 WO HCPCS: Performed by: ANESTHESIOLOGY

## 2022-02-18 PROCEDURE — 6360000002 HC RX W HCPCS: Performed by: ANESTHESIOLOGY

## 2022-02-18 PROCEDURE — 2709999900 HC NON-CHARGEABLE SUPPLY: Performed by: ANESTHESIOLOGY

## 2022-02-18 PROCEDURE — 3610000054 HC PAIN LEVEL 3 BASE (NON-OR): Performed by: ANESTHESIOLOGY

## 2022-02-18 RX ORDER — BUPIVACAINE HYDROCHLORIDE 5 MG/ML
INJECTION, SOLUTION EPIDURAL; INTRACAUDAL
Status: COMPLETED | OUTPATIENT
Start: 2022-02-18 | End: 2022-02-18

## 2022-02-18 RX ORDER — LIDOCAINE HYDROCHLORIDE 10 MG/ML
INJECTION, SOLUTION EPIDURAL; INFILTRATION; INTRACAUDAL; PERINEURAL
Status: COMPLETED | OUTPATIENT
Start: 2022-02-18 | End: 2022-02-18

## 2022-02-18 RX ORDER — METHYLPREDNISOLONE ACETATE 80 MG/ML
INJECTION, SUSPENSION INTRA-ARTICULAR; INTRALESIONAL; INTRAMUSCULAR; SOFT TISSUE
Status: COMPLETED | OUTPATIENT
Start: 2022-02-18 | End: 2022-02-18

## 2022-02-18 ASSESSMENT — PAIN - FUNCTIONAL ASSESSMENT
PAIN_FUNCTIONAL_ASSESSMENT: PREVENTS OR INTERFERES SOME ACTIVE ACTIVITIES AND ADLS
PAIN_FUNCTIONAL_ASSESSMENT: 0-10
PAIN_FUNCTIONAL_ASSESSMENT: 0-10

## 2022-02-18 ASSESSMENT — PAIN SCALES - GENERAL
PAINLEVEL_OUTOF10: 0
PAINLEVEL_OUTOF10: 0

## 2022-02-18 ASSESSMENT — PAIN DESCRIPTION - DESCRIPTORS: DESCRIPTORS: ACHING;BURNING;DISCOMFORT

## 2022-02-18 NOTE — OP NOTE
sacrum and the greater trochanter. The superficial skin projection was anesthetized with buffered lidocaine 1% 2 cc using a 27-gauge needle. A spinal needle was then inserted slowly under direct intermittent AP fluoroscopy towards the ilium and the overlying piriformis muscle. Negative aspiration was re-demonstrated and therapeutic injectate consisting of 6 cc of lidocaine 1%, 1 cc of bupivacaine 0.5% and 1 cc of Depo-Medrol 40 mg/cc was injected without pain, paresthesia, difficulty, or complaints. The needle was removed, the area cleansed, a Band-Aid placed over the injection site. Patient tolerated the procedure well. There were no complications. The patient was transferred, by wheelchair or stretcher, with accompaniment to the recovery area where the vital signs remained stable. There was sensory or motor blockade in the lower extremity. This procedure was done using local anesthesia only. The patient was discharged in stable condition accompanied with an escort after fulfilling standard discharge criteria. FLUOROSCOPY:  A fluoroscopy unit was utilized to obtain fluoroscopic images for intra-procedural use and assistance. Fluoroscopy was utilized to identify anatomic and radiographic landmarks for the accompanying procedure guidance and not for diagnostic purposes. Estimated Blood Loss: 0ml      Plan:  Follow up in 4-6 weeks    Modifier 22: Procedure took >75% more time than a typical procedure secondary to Body mass index is 43.7 kg/m². , making visualization and needle placement more difficult.       Office: [phone]

## 2022-02-18 NOTE — H&P
Patient:  Carmelita Johnson  YOB: 1965  Medical Record #:  [id number]   Place: [address] [facility]  Date:  2/18/2022   Physician:  Sara White MD    History Obtained From: electronic medical record    HISTORY OF PRESENT ILLNESS    Past Medical History:        Diagnosis Date    Anemia     Anesthesia complication     severe headache after woke up after wisdom teeth    Anxiety and depression     Chronic back pain     Condyloma acuminatum     Headache(784.0)     Hyperlipemia, mixed     Hypertension     Hypertension, essential     Hypothyroidism     Hypothyroidism     IBS (irritable bowel syndrome)     Metrorrhagia     Morbid obesity with BMI of 40.0-44.9, adult (HCC)     Neuropathy     Obstructive sleep apnea, adult     cpap    Osteoarthritis     Pre-operative clearance     Urgency of urination     Wears glasses     driving at night/reading     Past Surgical History:        Procedure Laterality Date    HIP SURGERY  2016, 2020    LEEP      PAIN MANAGEMENT PROCEDURE Left 3/12/2021    LUMBAR EPIDURAL STEROID INJECTION LEFT L4-5 performed by Nilda Banegas MD at [address] Left 08/16/2016    TOTAL HIP ARTHROPLASTY Right 11/16/2020    RIGHT LATERAL TOTAL HIP REPLACEMENT performed by Irma Roberson MD at [address]       Medications Prior to Admission:   No current facility-administered medications on file prior to encounter.      Current Outpatient Medications on File Prior to Encounter   Medication Sig Dispense Refill    oxybutynin (DITROPAN-XL) 10 MG extended release tablet TAKE ONE TABLET BY MOUTH DAILY 30 tablet 0    simvastatin (ZOCOR) 40 MG tablet TAKE ONE TABLET BY MOUTH DAILY 30 tablet 5    hydroCHLOROthiazide (HYDRODIURIL) 25 MG tablet TAKE ONE TABLET BY MOUTH EVERY MORNING 30 tablet 5    hydroxychloroquine (PLAQUENIL) 200 MG tablet TAKE ONE TABLET BY MOUTH TWICE A DAY 60 tablet 0    levothyroxine (SYNTHROID) 300 MCG tablet TAKE ONE TABLET BY MOUTH DAILY 90 tablet 1    simvastatin (ZOCOR) 20 MG tablet TAKE ONE TABLET BY MOUTH ONCE NIGHTLY 30 tablet 0    triamterene-hydroCHLOROthiazide (MAXZIDE) 75-50 MG per tablet Take 1 tablet by mouth daily 30 tablet 5    folic acid (FOLVITE) 240 MCG tablet Take 400 mcg by mouth daily       vitamin D (CHOLECALCIFEROL) 1000 UNIT TABS tablet Take 1,000 Units by mouth daily      vitamin B-12 (CYANOCOBALAMIN) 1000 MCG tablet Take 1,000 mcg by mouth daily      Biotin 53956 MCG TABS Take by mouth      naproxen (NAPROSYN) 500 MG tablet Take 1 tablet by mouth 2 times daily (with meals) (Patient not taking: Reported on 8/26/2021) 60 tablet 5     Allergies:  Glucosamine chondroitin complx [glucosamine-chondroitin], Sulfa antibiotics, and Erythromycin  Social History     Socioeconomic History    Marital status:      Spouse name: Not on file    Number of children: 2    Years of education: Not on file    Highest education level: Not on file   Occupational History    Occupation:    Tobacco Use    Smoking status: Current Every Day Smoker     Packs/day: 1.00     Years: 30.00     Pack years: 30.00     Types: Cigarettes    Smokeless tobacco: Never Used    Tobacco comment: encouraged to stop smoking    Vaping Use    Vaping Use: Never used   Substance and Sexual Activity    Alcohol use: Yes     Alcohol/week: 0.0 standard drinks     Comment: rare    Drug use: Never    Sexual activity: Not Currently   Other Topics Concern    Not on file   Social History Narrative    Not on file     Social Determinants of Health     Financial Resource Strain: Low Risk     Difficulty of Paying Living Expenses: Not hard at all   Food Insecurity: No Food Insecurity    Worried About [address] in the Last Year: Never true    [address] [facility] [address] in the Last Year: Never true   Transportation Needs:     Lack of Transportation (Medical):  Not on file    Lack of Transportation (Non-Medical): Not on file   Physical Activity:     Days of Exercise per Week: Not on file    Minutes of Exercise per Session: Not on file   Stress:     Feeling of Stress : Not on file   Social Connections:     Frequency of Communication with Friends and Family: Not on file    Frequency of Social Gatherings with Friends and Family: Not on file    Attends [employer] Services: Not on file    Active Member of [address] [place] [address] or Organizations: Not on file    Attends Club or Organization Meetings: Not on file    Marital Status: Not on file   Intimate Partner Violence:     Fear of Current or Ex-Partner: Not on file    Emotionally Abused: Not on file    Physically Abused: Not on file    Sexually Abused: Not on file   Housing Stability:     Unable to Pay for Housing in the Last Year: Not on file    Number of [place] in the Last Year: Not on file    Unstable Housing in the Last Year: Not on file     Family History   Problem Relation Age of Onset    Diabetes Other     High Blood Pressure Other     Stroke Other     Cancer Other         Bone    Alcohol Abuse Other     Heart Disease Mother         cardiac arrhythmia    High Blood Pressure Mother     Thyroid Disease Mother     Hypertension Mother     Cancer Mother     Other Mother         blood clots    Migraines Mother     Arthritis Mother     Arthritis Father     Heart Disease Father         cabg    Diabetes Father     Stroke Father     Hypertension Father     Other Father         blood clots    Cancer Sister         breast cancer    Cancer Brother         lung         PHYSICAL EXAM:      BP (!) 176/111   Pulse 75   Temp 96.6 °F (35.9 °C) (Temporal)   Resp 16   Ht 5' 7\" (1.702 m)   Wt 279 lb (126.6 kg)   LMP 01/27/2015   SpO2 96%   BMI 43.70 kg/m²  I            ASSESSMENT AND PLAN:    1. Procedure. options, risks and benefits reviewed with patient and expresses understanding.

## 2022-03-02 RX ORDER — LEVOTHYROXINE SODIUM 300 UG/1
TABLET ORAL
Qty: 90 TABLET | Refills: 1 | Status: SHIPPED | OUTPATIENT
Start: 2022-03-02 | End: 2022-09-12

## 2022-04-13 DIAGNOSIS — N39.3 STRESS INCONTINENCE: ICD-10-CM

## 2022-04-13 RX ORDER — OXYBUTYNIN CHLORIDE 10 MG/1
TABLET, EXTENDED RELEASE ORAL
Qty: 30 TABLET | Refills: 0 | Status: SHIPPED | OUTPATIENT
Start: 2022-04-13 | End: 2022-05-15

## 2022-04-28 DIAGNOSIS — M19.90 INFLAMMATORY ARTHRITIS: ICD-10-CM

## 2022-04-28 RX ORDER — HYDROXYCHLOROQUINE SULFATE 200 MG/1
TABLET, FILM COATED ORAL
Qty: 60 TABLET | Refills: 2 | Status: SHIPPED | OUTPATIENT
Start: 2022-04-28 | End: 2022-09-08 | Stop reason: SDUPTHER

## 2022-05-04 ENCOUNTER — OFFICE VISIT (OUTPATIENT)
Dept: FAMILY MEDICINE CLINIC | Age: 57
End: 2022-05-04
Payer: MEDICARE

## 2022-05-04 VITALS
DIASTOLIC BLOOD PRESSURE: 90 MMHG | BODY MASS INDEX: 44.73 KG/M2 | HEIGHT: 67 IN | SYSTOLIC BLOOD PRESSURE: 142 MMHG | WEIGHT: 285 LBS

## 2022-05-04 DIAGNOSIS — Z12.11 COLON CANCER SCREENING: ICD-10-CM

## 2022-05-04 DIAGNOSIS — Z12.31 ENCOUNTER FOR SCREENING MAMMOGRAM FOR MALIGNANT NEOPLASM OF BREAST: Primary | ICD-10-CM

## 2022-05-04 DIAGNOSIS — E66.01 OBESITY, CLASS III, BMI 40-49.9 (MORBID OBESITY) (HCC): ICD-10-CM

## 2022-05-04 DIAGNOSIS — E03.9 HYPOTHYROIDISM, UNSPECIFIED TYPE: ICD-10-CM

## 2022-05-04 DIAGNOSIS — E11.69 TYPE 2 DIABETES MELLITUS WITH OTHER SPECIFIED COMPLICATION, UNSPECIFIED WHETHER LONG TERM INSULIN USE (HCC): ICD-10-CM

## 2022-05-04 DIAGNOSIS — N18.30 STAGE 3 CHRONIC KIDNEY DISEASE, UNSPECIFIED WHETHER STAGE 3A OR 3B CKD (HCC): ICD-10-CM

## 2022-05-04 DIAGNOSIS — Z00.00 INITIAL MEDICARE ANNUAL WELLNESS VISIT: ICD-10-CM

## 2022-05-04 DIAGNOSIS — E78.2 HYPERLIPEMIA, MIXED: ICD-10-CM

## 2022-05-04 PROBLEM — E11.9 TYPE 2 DIABETES MELLITUS (HCC): Status: ACTIVE | Noted: 2022-05-04

## 2022-05-04 LAB
A/G RATIO: 1.7 (ref 1.1–2.2)
ALBUMIN SERPL-MCNC: 4.2 G/DL (ref 3.4–5)
ALP BLD-CCNC: 109 U/L (ref 40–129)
ALT SERPL-CCNC: 21 U/L (ref 10–40)
ANION GAP SERPL CALCULATED.3IONS-SCNC: 12 MMOL/L (ref 3–16)
AST SERPL-CCNC: 18 U/L (ref 15–37)
BILIRUB SERPL-MCNC: <0.2 MG/DL (ref 0–1)
BUN BLDV-MCNC: 19 MG/DL (ref 7–20)
CALCIUM SERPL-MCNC: 10 MG/DL (ref 8.3–10.6)
CHLORIDE BLD-SCNC: 97 MMOL/L (ref 99–110)
CHOLESTEROL, TOTAL: 194 MG/DL (ref 0–199)
CO2: 29 MMOL/L (ref 21–32)
CREAT SERPL-MCNC: 0.8 MG/DL (ref 0.6–1.1)
GFR AFRICAN AMERICAN: >60
GFR NON-AFRICAN AMERICAN: >60
GLUCOSE BLD-MCNC: 128 MG/DL (ref 70–99)
HCT VFR BLD CALC: 45 % (ref 36–48)
HDLC SERPL-MCNC: 40 MG/DL (ref 40–60)
HEMOGLOBIN: 15.3 G/DL (ref 12–16)
LDL CHOLESTEROL CALCULATED: ABNORMAL MG/DL
LDL CHOLESTEROL DIRECT: 95 MG/DL
MCH RBC QN AUTO: 32.4 PG (ref 26–34)
MCHC RBC AUTO-ENTMCNC: 34.1 G/DL (ref 31–36)
MCV RBC AUTO: 95 FL (ref 80–100)
PDW BLD-RTO: 16 % (ref 12.4–15.4)
PLATELET # BLD: 162 K/UL (ref 135–450)
PMV BLD AUTO: 10.6 FL (ref 5–10.5)
POTASSIUM SERPL-SCNC: 4.5 MMOL/L (ref 3.5–5.1)
RBC # BLD: 4.73 M/UL (ref 4–5.2)
SODIUM BLD-SCNC: 138 MMOL/L (ref 136–145)
TOTAL PROTEIN: 6.7 G/DL (ref 6.4–8.2)
TRIGL SERPL-MCNC: 359 MG/DL (ref 0–150)
TSH SERPL DL<=0.05 MIU/L-ACNC: 2.18 UIU/ML (ref 0.27–4.2)
VLDLC SERPL CALC-MCNC: ABNORMAL MG/DL
WBC # BLD: 9.8 K/UL (ref 4–11)

## 2022-05-04 PROCEDURE — 3017F COLORECTAL CA SCREEN DOC REV: CPT | Performed by: FAMILY MEDICINE

## 2022-05-04 PROCEDURE — G0438 PPPS, INITIAL VISIT: HCPCS | Performed by: FAMILY MEDICINE

## 2022-05-04 PROCEDURE — 3046F HEMOGLOBIN A1C LEVEL >9.0%: CPT | Performed by: FAMILY MEDICINE

## 2022-05-04 PROCEDURE — 36415 COLL VENOUS BLD VENIPUNCTURE: CPT | Performed by: FAMILY MEDICINE

## 2022-05-04 ASSESSMENT — ENCOUNTER SYMPTOMS
RESPIRATORY NEGATIVE: 1
GASTROINTESTINAL NEGATIVE: 1

## 2022-05-04 ASSESSMENT — PATIENT HEALTH QUESTIONNAIRE - PHQ9
SUM OF ALL RESPONSES TO PHQ QUESTIONS 1-9: 5
SUM OF ALL RESPONSES TO PHQ QUESTIONS 1-9: 5
6. FEELING BAD ABOUT YOURSELF - OR THAT YOU ARE A FAILURE OR HAVE LET YOURSELF OR YOUR FAMILY DOWN: 1
8. MOVING OR SPEAKING SO SLOWLY THAT OTHER PEOPLE COULD HAVE NOTICED. OR THE OPPOSITE, BEING SO FIGETY OR RESTLESS THAT YOU HAVE BEEN MOVING AROUND A LOT MORE THAN USUAL: 0
10. IF YOU CHECKED OFF ANY PROBLEMS, HOW DIFFICULT HAVE THESE PROBLEMS MADE IT FOR YOU TO DO YOUR WORK, TAKE CARE OF THINGS AT HOME, OR GET ALONG WITH OTHER PEOPLE: 0
SUM OF ALL RESPONSES TO PHQ9 QUESTIONS 1 & 2: 2
7. TROUBLE CONCENTRATING ON THINGS, SUCH AS READING THE NEWSPAPER OR WATCHING TELEVISION: 0
2. FEELING DOWN, DEPRESSED OR HOPELESS: 1
SUM OF ALL RESPONSES TO PHQ QUESTIONS 1-9: 5
5. POOR APPETITE OR OVEREATING: 1
4. FEELING TIRED OR HAVING LITTLE ENERGY: 1
SUM OF ALL RESPONSES TO PHQ QUESTIONS 1-9: 5
3. TROUBLE FALLING OR STAYING ASLEEP: 0
1. LITTLE INTEREST OR PLEASURE IN DOING THINGS: 1
9. THOUGHTS THAT YOU WOULD BE BETTER OFF DEAD, OR OF HURTING YOURSELF: 0

## 2022-05-04 ASSESSMENT — LIFESTYLE VARIABLES
HOW MANY STANDARD DRINKS CONTAINING ALCOHOL DO YOU HAVE ON A TYPICAL DAY: 1 OR 2
HOW OFTEN DO YOU HAVE A DRINK CONTAINING ALCOHOL: NEVER

## 2022-05-04 NOTE — PROGRESS NOTES
OUTPATIENT PROGRESS NOTE  Date of Service:  5/4/2022  Address: [facility] [address] [facility]  [address] [place] [address],First Floor [address]  Dept: [phone]  Loc: [phone]    Subjective:      Patient ID:  [id number]  Marita Williamson is a 60 y.o. female     Hyperlipidemia  This is a chronic problem. The current episode started more than 1 year ago. The problem is controlled. Current antihyperlipidemic treatment includes diet change, exercise and statins. The current treatment provides mild improvement of lipids. Compliance problems include adherence to diet and adherence to exercise. Risk factors for coronary artery disease include dyslipidemia. Elevated glucose   Wants to have blood work   Has been running high    Hypothyroid   She needs tsh recheck         Review of Systems   Constitutional: Negative. Respiratory: Negative. Cardiovascular: Negative. Gastrointestinal: Negative. Skin: Negative. Neurological: Negative. Objective:   YOB: 1965    Date of Visit:  5/4/2022       Allergies   Allergen Reactions    Glucosamine Chondroitin Complx [Glucosamine-Chondroitin] Swelling     \"Due to sulfa allergy\" per pt    Sulfa Antibiotics Itching, Swelling and Other (See Comments)     Pt states gets really hot like she is on fire,and swelling of face,hands, and feet    Erythromycin Swelling       Outpatient Medications Marked as Taking for the 5/4/22 encounter (Office Visit) with Wiliam Andrews, DO   Medication Sig Dispense Refill    hydroxychloroquine (PLAQUENIL) 200 MG tablet Take 1 tablet by mouth twice daily.  60 tablet 2    oxybutynin (DITROPAN-XL) 10 MG extended release tablet TAKE ONE TABLET BY MOUTH DAILY 30 tablet 0    levothyroxine (SYNTHROID) 300 MCG tablet TAKE ONE TABLET BY MOUTH DAILY 90 tablet 1    simvastatin (ZOCOR) 40 MG tablet TAKE ONE TABLET BY MOUTH DAILY 30 tablet 5    hydroCHLOROthiazide (HYDRODIURIL) 25 MG tablet TAKE ONE TABLET BY MOUTH EVERY MORNING 30 tablet 5    simvastatin (ZOCOR) 20 MG tablet TAKE ONE TABLET BY MOUTH ONCE NIGHTLY 30 tablet 0    triamterene-hydroCHLOROthiazide (MAXZIDE) 75-50 MG per tablet Take 1 tablet by mouth daily 30 tablet 5    naproxen (NAPROSYN) 500 MG tablet Take 1 tablet by mouth 2 times daily (with meals) 60 tablet 5    folic acid (FOLVITE) 293 MCG tablet Take 400 mcg by mouth daily       vitamin D (CHOLECALCIFEROL) 1000 UNIT TABS tablet Take 1,000 Units by mouth daily      vitamin B-12 (CYANOCOBALAMIN) 1000 MCG tablet Take 1,000 mcg by mouth daily      Biotin 19459 MCG TABS Take by mouth         Vitals:    05/04/22 1323   BP: (!) 142/90   Weight: 285 lb (129.3 kg)   Height: 5' 7\" (1.702 m)     Body mass index is 44.64 kg/m². Wt Readings from Last 3 Encounters:   05/04/22 285 lb (129.3 kg)   02/18/22 279 lb (126.6 kg)   07/29/21 279 lb (126.6 kg)     BP Readings from Last 3 Encounters:   05/04/22 (!) 142/90   02/18/22 (!) 144/81   08/26/21 (!) 144/82       Physical Exam  Vitals and nursing note reviewed. Constitutional:       Appearance: She is well-developed. HENT:      Head: Normocephalic. Neck:      Thyroid: No thyromegaly. Cardiovascular:      Rate and Rhythm: Normal rate and regular rhythm. Heart sounds: Normal heart sounds. Pulmonary:      Effort: Pulmonary effort is normal.      Breath sounds: Normal breath sounds. Lymphadenopathy:      Cervical: No cervical adenopathy. Neurological:      Mental Status: She is alert and oriented to person, place, and time. Psychiatric:         Behavior: Behavior normal.         Thought Content: Thought content normal.         Judgment: Judgment normal.            Assessment/Plan       There are no diagnoses linked to this encounter. No follow-ups on file.                     Alyse Michaud DO  Medicare Annual Wellness Visit    Layramo Bob is here for Medicare AWV (would like to discuss weightloss and possible diabetic )    Assessment & Plan   Encounter for screening mammogram for malignant neoplasm of breast  -     AMY DIGITAL SCREEN W OR WO CAD BILATERAL; Future  Obesity, Class III, BMI 40-49.9 (morbid obesity) (HCC)  Stage 3 chronic kidney disease, unspecified whether stage 3a or 3b CKD (HCC)  Hypothyroidism, unspecified type  -     TSH  Hyperlipemia, mixed  -     CBC  -     Comprehensive Metabolic Panel  -     Lipid Panel  -     TSH  Type 2 diabetes mellitus with other specified complication, unspecified whether long term insulin use (HCC)  -     Hemoglobin A1C  Colon cancer screening  -     Fecal DNA Colorectal cancer screening (Cologuard)  Initial Medicare annual wellness visit      Recommendations for Preventive Services Due: see orders and patient instructions/AVS.  Recommended screening schedule for the next 5-10 years is provided to the patient in written form: see Patient Instructions/AVS.     Return for Medicare Annual Wellness Visit in 1 year. Subjective     Patient's complete Health Risk Assessment and screening values have been reviewed and are found in Flowsheets. The following problems were reviewed today and where indicated follow up appointments were made and/or referrals ordered.     Positive Risk Factor Screenings with Interventions:    Fall Risk:  Do you feel unsteady or are you worried about falling? : no  2 or more falls in past year?: (!) yes  Fall with injury in past year?: (!) yes     Fall Risk Interventions:    · Home safety tips provided     Depression:  PHQ-2 Score: 2  PHQ-9 Total Score: 5    Severity:1-4 = minimal depression, 5-9 = mild depression, 10-14 = moderate depression, 15-19 = moderately severe depression, 20-27 = severe depression    Depression Interventions:  Pt feeling down about her weight and her inability to exercise     Tobacco Use:     Tobacco Use: High Risk    Smoking Tobacco Use: Current Every Day Smoker    Smokeless Tobacco Use: Never Used     E-Cigarettes/Vaping Use Questions Responses    E-Cigarette/Vaping Use Never User    Start Date     Passive Exposure     Quit Date     Counseling Given Yes    Comments         Substance Use - Tobacco Interventions:  No issues         General Health and ACP:  General  In general, how would you say your health is?: Fair  In the past 7 days, have you experienced any of the following: New or Increased Pain, New or Increased Fatigue, Loneliness, Social Isolation, Stress or Anger?: No  Do you get the social and emotional support that you need?: Yes  Do you have a Living Will?: (!) No    Advance Directives     Power of [address] [place] [address] Will ACP-Advance Directive ACP-Power of     Not on File Not on File Not on File Not on File      General Health Risk Interventions:  No interventions    Health Habits/Nutrition:     Physical Activity: Insufficiently Active    Days of Exercise per Week: 1 day    Minutes of Exercise per Session: 10 min     Have you lost any weight without trying in the past 3 months?: No  Body mass index: (!) 44.63  Have you seen the dentist within the past year?: (!) No    Health Habits/Nutrition Interventions:  · Inadequate physical activity:  pt not able to exercise due to physical limitations     Safety:  Do you have working smoke detectors?: Yes  Do you have any tripping hazards - loose or unsecured carpets or rugs?: No  Do you have any tripping hazards - clutter in doorways, halls, or stairs?: No  Do you have either shower bars, grab bars, non-slip mats or non-slip surfaces in your shower or bathtub?: Yes  Do all of your stairways have a railing or banister?: Yes  Do you always fasten your seatbelt when you are in a car?: (!) No    Safety Interventions:  · Home safety tips provided           Objective   Vitals:    05/04/22 1323   BP: (!) 142/90   Weight: 285 lb (129.3 kg)   Height: 5' 7\" (1.702 m)      Body mass index is 44.64 kg/m².             Allergies   Allergen Reactions    Glucosamine Chondroitin Complx [Glucosamine-Chondroitin] Swelling     \"Due to sulfa allergy\" per pt    Sulfa Antibiotics Itching, Swelling and Other (See Comments)     Pt states gets really hot like she is on fire,and swelling of face,hands, and feet    Erythromycin Swelling     Prior to Visit Medications    Medication Sig Taking? Authorizing Provider   hydroxychloroquine (PLAQUENIL) 200 MG tablet Take 1 tablet by mouth twice daily.  Yes Delroy Fuentes MD   oxybutynin (DITROPAN-XL) 10 MG extended release tablet TAKE ONE TABLET BY MOUTH DAILY Yes Paresh Stevens DO   levothyroxine (SYNTHROID) 300 MCG tablet TAKE ONE TABLET BY MOUTH DAILY Yes Paresh Stevens DO   simvastatin (ZOCOR) 40 MG tablet TAKE ONE TABLET BY MOUTH DAILY Yes Paresh Stevens DO   hydroCHLOROthiazide (HYDRODIURIL) 25 MG tablet TAKE ONE TABLET BY MOUTH EVERY MORNING Yes Paresh Stevens DO   simvastatin (ZOCOR) 20 MG tablet TAKE ONE TABLET BY MOUTH ONCE NIGHTLY Yes Paresh Stevens DO   triamterene-hydroCHLOROthiazide (MAXZIDE) 75-50 MG per tablet Take 1 tablet by mouth daily Yes Paresh Stevens DO   naproxen (NAPROSYN) 500 MG tablet Take 1 tablet by mouth 2 times daily (with meals) Yes Delroy Fuentes MD   folic acid (FOLVITE) 677 MCG tablet Take 400 mcg by mouth daily  Yes Historical Provider, MD   vitamin D (CHOLECALCIFEROL) 1000 UNIT TABS tablet Take 1,000 Units by mouth daily Yes Historical Provider, MD   vitamin B-12 (CYANOCOBALAMIN) 1000 MCG tablet Take 1,000 mcg by mouth daily Yes Historical Provider, MD   Biotin 62719 MCG TABS Take by mouth Yes Historical Provider, MD [facility] (Including outside providers/suppliers regularly involved in providing care):   Patient Care Team:  Rui Christie DO as PCP - General  Rui Christie DO as PCP - [facility] Empaneled Provider    Reviewed and updated this visit:  Tobacco  Allergies  Meds  Problems  Med Hx  Surg Hx  Soc Hx  Fam Hx

## 2022-05-05 LAB
ESTIMATED AVERAGE GLUCOSE: 180 MG/DL
HBA1C MFR BLD: 7.9 %

## 2022-05-05 NOTE — PATIENT INSTRUCTIONS
Personalized Preventive Plan for Zabrina Argueta - 5/4/2022  Medicare offers a range of preventive health benefits. Some of the tests and screenings are paid in full while other may be subject to a deductible, co-insurance, and/or copay. Some of these benefits include a comprehensive review of your medical history including lifestyle, illnesses that may run in your family, and various assessments and screenings as appropriate. After reviewing your medical record and screening and assessments performed today your provider may have ordered immunizations, labs, imaging, and/or referrals for you. A list of these orders (if applicable) as well as your Preventive Care list are included within your After Visit Summary for your review. Other Preventive Recommendations:    · A preventive eye exam performed by an eye specialist is recommended every 1-2 years to screen for glaucoma; cataracts, macular degeneration, and other eye disorders. · A preventive dental visit is recommended every 6 months. · Try to get at least 150 minutes of exercise per week or 10,000 steps per day on a pedometer . · Order or download the FREE \"Exercise & Physical Activity: Your Everyday Guide\" from [employer]. Call 6-353.823.4716 or search [employer] online. · You need 7582-0413 mg of calcium and 1091-8778 IU of vitamin D per day. It is possible to meet your calcium requirement with diet alone, but a vitamin D supplement is usually necessary to meet this goal.  · When exposed to the sun, use a sunscreen that protects against both UVA and UVB radiation with an SPF of 30 or greater. Reapply every 2 to 3 hours or after sweating, drying off with a towel, or swimming. · Always wear a seat belt when traveling in a car. Always wear a helmet when riding a bicycle or motorcycle.

## 2022-05-15 DIAGNOSIS — N39.3 STRESS INCONTINENCE: ICD-10-CM

## 2022-05-15 RX ORDER — OXYBUTYNIN CHLORIDE 10 MG/1
TABLET, EXTENDED RELEASE ORAL
Qty: 30 TABLET | Refills: 0 | Status: SHIPPED | OUTPATIENT
Start: 2022-05-15 | End: 2022-06-08

## 2022-06-02 ENCOUNTER — TELEPHONE (OUTPATIENT)
Dept: FAMILY MEDICINE CLINIC | Age: 57
End: 2022-06-02

## 2022-06-02 NOTE — TELEPHONE ENCOUNTER
----- Message from Baljit Pelaez sent at 6/2/2022 10:31 AM EDT -----  Subject: Appointment Request    Reason for Call: Routine Pre-Op    QUESTIONS  Type of Appointment? Established Patient  Reason for appointment request? Available appointments did not meet   patient need  Additional Information for Provider? pt is having cataract surg on 6.15   Dr. Zachary Morrissey? [facility], ekg unknown, labs unknown just at a physical not   too long ago   ---------------------------------------------------------------------------  --------------  [address]  What is the best way for the office to contact you? OK to leave message on   voicemail  Preferred Call Back Phone Number? [phone]  ---------------------------------------------------------------------------  --------------  SCRIPT ANSWERS  Relationship to Patient? Self  Do you have questions for your provider that need to be answered prior to   scheduling your pre-op appointment? No  Have you been diagnosed with, awaiting test results for, or told that you   are suspected of having COVID-19 (Coronavirus)? (If patient has tested   negative or was tested as a requirement for work, school, or travel and   not based on symptoms, answer no)? No  Within the past 10 days have you developed any of the following symptoms   (answer no if symptoms have been present longer than 10 days or began   more than 10 days ago)? Fever or Chills, Cough, Shortness of breath or   difficulty breathing, Loss of taste or smell, Sore throat, Nasal   congestion, Sneezing or runny nose, Fatigue or generalized body aches   (answer no if pain is specific to a body part e.g. back pain), Diarrhea,   Headache? No  Have you had close contact with someone with COVID-19 in the last 7 days? No  (Service Expert  click yes below to proceed with [employer] As Usual   Scheduling)?  Yes

## 2022-06-08 ENCOUNTER — OFFICE VISIT (OUTPATIENT)
Dept: FAMILY MEDICINE CLINIC | Age: 57
End: 2022-06-08
Payer: MEDICARE

## 2022-06-08 VITALS
DIASTOLIC BLOOD PRESSURE: 78 MMHG | WEIGHT: 285 LBS | BODY MASS INDEX: 44.73 KG/M2 | HEIGHT: 67 IN | SYSTOLIC BLOOD PRESSURE: 122 MMHG

## 2022-06-08 DIAGNOSIS — H26.9 CATARACT OF RIGHT EYE, UNSPECIFIED CATARACT TYPE: ICD-10-CM

## 2022-06-08 DIAGNOSIS — N39.3 STRESS INCONTINENCE: ICD-10-CM

## 2022-06-08 DIAGNOSIS — E66.01 CLASS 3 SEVERE OBESITY DUE TO EXCESS CALORIES WITH SERIOUS COMORBIDITY AND BODY MASS INDEX (BMI) OF 40.0 TO 44.9 IN ADULT (HCC): ICD-10-CM

## 2022-06-08 DIAGNOSIS — Z01.818 PREOP EXAMINATION: Primary | ICD-10-CM

## 2022-06-08 PROCEDURE — G8427 DOCREV CUR MEDS BY ELIG CLIN: HCPCS | Performed by: FAMILY MEDICINE

## 2022-06-08 PROCEDURE — 99212 OFFICE O/P EST SF 10 MIN: CPT | Performed by: FAMILY MEDICINE

## 2022-06-08 PROCEDURE — G8417 CALC BMI ABV UP PARAM F/U: HCPCS | Performed by: FAMILY MEDICINE

## 2022-06-08 RX ORDER — OXYBUTYNIN CHLORIDE 10 MG/1
TABLET, EXTENDED RELEASE ORAL
Qty: 30 TABLET | Refills: 0 | Status: SHIPPED
Start: 2022-06-08 | End: 2022-09-08

## 2022-06-08 RX ORDER — PHENTERMINE HYDROCHLORIDE 37.5 MG/1
37.5 TABLET ORAL
Qty: 30 TABLET | Refills: 0 | Status: SHIPPED | OUTPATIENT
Start: 2022-06-08 | End: 2022-08-15 | Stop reason: SDUPTHER

## 2022-06-08 ASSESSMENT — ENCOUNTER SYMPTOMS
RESPIRATORY NEGATIVE: 1
GASTROINTESTINAL NEGATIVE: 1

## 2022-06-08 NOTE — PROGRESS NOTES
OUTPATIENT PROGRESS NOTE  Date of Service:  6/8/2022  Address: [facility]  [address] [facility],First [address] [address]  Dept: [phone]  Loc: [phone]    Subjective:      Patient ID:  [id number]  Marita Williamson is a 60 y.o. female     HPI  6/15/22 Dr Jacob West  Right cataract    Review of Systems   Constitutional: Negative. Eyes: Positive for visual disturbance. Respiratory: Negative. Cardiovascular: Negative. Gastrointestinal: Negative. Skin: Negative. Neurological: Negative. Objective:   YOB: 1965    Date of Visit:  6/8/2022       Allergies   Allergen Reactions    Glucosamine Chondroitin Complx [Glucosamine-Chondroitin] Swelling     \"Due to sulfa allergy\" per pt    Sulfa Antibiotics Itching, Swelling and Other (See Comments)     Pt states gets really hot like she is on fire,and swelling of face,hands, and feet    Erythromycin Swelling       Outpatient Medications Marked as Taking for the 6/8/22 encounter (Office Visit) with Wiliam Andrews, DO   Medication Sig Dispense Refill    oxybutynin (DITROPAN-XL) 10 MG extended release tablet TAKE ONE TABLET BY MOUTH DAILY 30 tablet 0    hydroxychloroquine (PLAQUENIL) 200 MG tablet Take 1 tablet by mouth twice daily.  60 tablet 2    levothyroxine (SYNTHROID) 300 MCG tablet TAKE ONE TABLET BY MOUTH DAILY 90 tablet 1    simvastatin (ZOCOR) 40 MG tablet TAKE ONE TABLET BY MOUTH DAILY 30 tablet 5    hydroCHLOROthiazide (HYDRODIURIL) 25 MG tablet TAKE ONE TABLET BY MOUTH EVERY MORNING 30 tablet 5    simvastatin (ZOCOR) 20 MG tablet TAKE ONE TABLET BY MOUTH ONCE NIGHTLY 30 tablet 0    triamterene-hydroCHLOROthiazide (MAXZIDE) 75-50 MG per tablet Take 1 tablet by mouth daily 30 tablet 5    naproxen (NAPROSYN) 500 MG tablet Take 1 tablet by mouth 2 times daily (with meals) 60 tablet 5    folic acid (FOLVITE) 450 MCG tablet Take 400 mcg by mouth daily       vitamin D (CHOLECALCIFEROL) 1000 UNIT TABS tablet Take 1,000 Units by mouth daily      vitamin B-12 (CYANOCOBALAMIN) 1000 MCG tablet Take 1,000 mcg by mouth daily      Biotin 20015 MCG TABS Take by mouth         Vitals:    06/08/22 0920   BP: 122/78   Weight: 285 lb (129.3 kg)   Height: 5' 7\" (1.702 m)     Body mass index is 44.64 kg/m². Wt Readings from Last 3 Encounters:   06/08/22 285 lb (129.3 kg)   05/04/22 285 lb (129.3 kg)   02/18/22 279 lb (126.6 kg)     BP Readings from Last 3 Encounters:   06/08/22 122/78   05/04/22 (!) 142/90   02/18/22 (!) 144/81     Allergies   Allergen Reactions    Glucosamine Chondroitin Complx [Glucosamine-Chondroitin] Swelling     \"Due to sulfa allergy\" per pt    Sulfa Antibiotics Itching, Swelling and Other (See Comments)     Pt states gets really hot like she is on fire,and swelling of face,hands, and feet    Erythromycin Swelling     Current Outpatient Medications   Medication Sig Dispense Refill    phentermine (ADIPEX-P) 37.5 MG tablet Take 1 tablet by mouth every morning (before breakfast) for 30 days. 30 tablet 0    oxybutynin (DITROPAN-XL) 10 MG extended release tablet TAKE ONE TABLET BY MOUTH DAILY 30 tablet 0    hydroxychloroquine (PLAQUENIL) 200 MG tablet Take 1 tablet by mouth twice daily.  60 tablet 2    levothyroxine (SYNTHROID) 300 MCG tablet TAKE ONE TABLET BY MOUTH DAILY 90 tablet 1    simvastatin (ZOCOR) 40 MG tablet TAKE ONE TABLET BY MOUTH DAILY 30 tablet 5    hydroCHLOROthiazide (HYDRODIURIL) 25 MG tablet TAKE ONE TABLET BY MOUTH EVERY MORNING 30 tablet 5    simvastatin (ZOCOR) 20 MG tablet TAKE ONE TABLET BY MOUTH ONCE NIGHTLY 30 tablet 0    triamterene-hydroCHLOROthiazide (MAXZIDE) 75-50 MG per tablet Take 1 tablet by mouth daily 30 tablet 5    naproxen (NAPROSYN) 500 MG tablet Take 1 tablet by mouth 2 times daily (with meals) 60 tablet 5    folic acid (FOLVITE) 874 MCG tablet Take 400 mcg by mouth daily       vitamin D (CHOLECALCIFEROL) 1000 UNIT TABS tablet Take 1,000 Units by mouth daily      vitamin B-12 (CYANOCOBALAMIN) 1000 MCG tablet Take 1,000 mcg by mouth daily      Biotin 27649 MCG TABS Take by mouth       No current facility-administered medications for this visit. Past Medical History:   Diagnosis Date    Anemia     Anesthesia complication     severe headache after woke up after wisdom teeth    Anxiety and depression     Chronic back pain     Condyloma acuminatum     Headache(784.0)     Hyperlipemia, mixed     Hypertension     Hypertension, essential     Hypothyroidism     Hypothyroidism     IBS (irritable bowel syndrome)     Metrorrhagia     Morbid obesity with BMI of 40.0-44.9, adult (HCC)     Neuropathy     Obstructive sleep apnea, adult     cpap    Osteoarthritis     Pre-operative clearance     Urgency of urination     Wears glasses     driving at night/reading     Past Surgical History:   Procedure Laterality Date    CATARACT REMOVAL      HIP SURGERY  2016, 2020    LEEP      NERVE BLOCK Right 02/18/2022    RIGHT SCIATIC NERVE BLOCK performed by Sara Pacheco MD at [address] Left 03/12/2021    LUMBAR EPIDURAL STEROID INJECTION LEFT L4-5 performed by Sara Pacheco MD at [address] [facility] Left 08/16/2016    TOTAL HIP ARTHROPLASTY Right 11/16/2020    RIGHT LATERAL TOTAL HIP REPLACEMENT performed by Kelly Tripathi MD at [address] [place] [address]       Social History     Tobacco Use    Smoking status: Current Every Day Smoker     Packs/day: 1.00     Years: 30.00     Pack years: 30.00     Types: Cigarettes    Smokeless tobacco: Never Used    Tobacco comment: encouraged to stop smoking    Vaping Use    Vaping Use: Never used   Substance Use Topics    Alcohol use:  Yes     Alcohol/week: 0.0 standard drinks     Comment: rare    Drug use: Never     Family History   Problem Relation Age of Onset    Heart Disease Mother cardiac arrhythmia    High Blood Pressure Mother     Thyroid Disease Mother     Hypertension Mother     Cancer Mother     Other Mother         blood clots   [place]oldan Migraines Mother     Arthritis Mother     Arthritis Father     Heart Disease Father         cabg    Diabetes Father     Stroke Father     Hypertension Father     Other Father         blood clots    Cancer Sister         breast cancer    Cancer Brother         lung    Diabetes Other     High Blood Pressure Other     Stroke Other     Cancer Other         Bone    Alcohol Abuse Other        Physical Exam  Vitals and nursing note reviewed. Constitutional:       Appearance: She is well-developed. HENT:      Head: Normocephalic. Neck:      Thyroid: No thyromegaly. Cardiovascular:      Rate and Rhythm: Normal rate and regular rhythm. Heart sounds: Normal heart sounds. Pulmonary:      Effort: Pulmonary effort is normal.      Breath sounds: Normal breath sounds. Lymphadenopathy:      Cervical: No cervical adenopathy. Neurological:      Mental Status: She is alert and oriented to person, place, and time. Psychiatric:         Behavior: Behavior normal.         Thought Content: Thought content normal.         Judgment: Judgment normal.            Assessment/Plan       Assessment/plan;  Liza Gordon was seen today for pre-op exam.    Diagnoses and all orders for this visit:    Preop examination  Pt medically clear for surgery  Cataract of right eye, unspecified cataract type    Class 3 severe obesity due to excess calories with serious comorbidity and body mass index (BMI) of 40.0 to 44.9 in adult ([facility])  -     phentermine (ADIPEX-P) 37.5 MG tablet; Take 1 tablet by mouth every morning (before breakfast) for 30 days.        Price Alvarenga DO

## 2022-08-07 DIAGNOSIS — M19.90 INFLAMMATORY ARTHRITIS: ICD-10-CM

## 2022-08-08 RX ORDER — HYDROXYCHLOROQUINE SULFATE 200 MG/1
TABLET, FILM COATED ORAL
Qty: 60 TABLET | Refills: 2 | OUTPATIENT
Start: 2022-08-08

## 2022-08-15 ENCOUNTER — OFFICE VISIT (OUTPATIENT)
Dept: FAMILY MEDICINE CLINIC | Age: 57
End: 2022-08-15
Payer: MEDICARE

## 2022-08-15 VITALS
SYSTOLIC BLOOD PRESSURE: 132 MMHG | BODY MASS INDEX: 43.47 KG/M2 | WEIGHT: 277 LBS | DIASTOLIC BLOOD PRESSURE: 86 MMHG | HEIGHT: 67 IN

## 2022-08-15 DIAGNOSIS — E66.01 CLASS 3 SEVERE OBESITY DUE TO EXCESS CALORIES WITH SERIOUS COMORBIDITY AND BODY MASS INDEX (BMI) OF 40.0 TO 44.9 IN ADULT (HCC): ICD-10-CM

## 2022-08-15 PROCEDURE — 4004F PT TOBACCO SCREEN RCVD TLK: CPT | Performed by: FAMILY MEDICINE

## 2022-08-15 PROCEDURE — 99213 OFFICE O/P EST LOW 20 MIN: CPT | Performed by: FAMILY MEDICINE

## 2022-08-15 PROCEDURE — G8417 CALC BMI ABV UP PARAM F/U: HCPCS | Performed by: FAMILY MEDICINE

## 2022-08-15 PROCEDURE — G8427 DOCREV CUR MEDS BY ELIG CLIN: HCPCS | Performed by: FAMILY MEDICINE

## 2022-08-15 PROCEDURE — 3017F COLORECTAL CA SCREEN DOC REV: CPT | Performed by: FAMILY MEDICINE

## 2022-08-15 RX ORDER — PHENTERMINE HYDROCHLORIDE 37.5 MG/1
37.5 TABLET ORAL
Qty: 30 TABLET | Refills: 0 | Status: SHIPPED
Start: 2022-08-15 | End: 2022-09-08

## 2022-08-15 ASSESSMENT — ENCOUNTER SYMPTOMS
GASTROINTESTINAL NEGATIVE: 1
RESPIRATORY NEGATIVE: 1

## 2022-08-15 NOTE — PROGRESS NOTES
OUTPATIENT PROGRESS NOTE  Date of Service:  8/15/2022  Address:  [facility]  [address] [place] [address],First Floor [address]  Dept: [phone]  Loc: [phone]    Subjective:      Patient ID:  [id number]  Félix Carbajal is a 60 y.o. female     HPI  Obesity   She is doing well   down 8 pounds  Helped her not want sweets  No side effects  Wants to continue     Review of Systems   Constitutional: Negative. HENT: Negative. Respiratory: Negative. Cardiovascular: Negative. Gastrointestinal: Negative. Neurological: Negative. Psychiatric/Behavioral:  Positive for decreased concentration. Objective:   YOB: 1965    Date of Visit:  8/15/2022       Allergies   Allergen Reactions    Glucosamine Chondroitin Complx [Glucosamine-Chondroitin] Swelling     \"Due to sulfa allergy\" per pt    Sulfa Antibiotics Itching, Swelling and Other (See Comments)     Pt states gets really hot like she is on fire,and swelling of face,hands, and feet    Erythromycin Swelling       Outpatient Medications Marked as Taking for the 8/15/22 encounter (Office Visit) with Satish Ann, DO   Medication Sig Dispense Refill    oxybutynin (DITROPAN-XL) 10 mg extended release tablet TAKE ONE TABLET BY MOUTH DAILY 30 tablet 0    hydroxychloroquine (PLAQUENIL) 200 MG tablet Take 1 tablet by mouth twice daily.  60 tablet 2    levothyroxine (SYNTHROID) 300 MCG tablet TAKE ONE TABLET BY MOUTH DAILY 90 tablet 1    simvastatin (ZOCOR) 40 MG tablet TAKE ONE TABLET BY MOUTH DAILY 30 tablet 5    hydroCHLOROthiazide (HYDRODIURIL) 25 MG tablet TAKE ONE TABLET BY MOUTH EVERY MORNING 30 tablet 5    triamterene-hydroCHLOROthiazide (MAXZIDE) 75-50 MG per tablet Take 1 tablet by mouth daily 30 tablet 5    naproxen (NAPROSYN) 500 MG tablet Take 1 tablet by mouth 2 times daily (with meals) 60 tablet 5    folic acid (FOLVITE) 657 MCG tablet Take 400 mcg by mouth daily       vitamin D (CHOLECALCIFEROL) 1000 UNIT TABS tablet Take 1,000 Units by mouth daily      vitamin B-12 (CYANOCOBALAMIN) 1000 MCG tablet Take 1,000 mcg by mouth daily      Biotin 03428 MCG TABS Take by mouth         Vitals:    08/15/22 1022   BP: 132/86   Weight: 277 lb (125.6 kg)   Height: 5' 7\" (1.702 m)     Body mass index is 43.38 kg/m². Wt Readings from Last 3 Encounters:   08/15/22 277 lb (125.6 kg)   06/08/22 285 lb (129.3 kg)   05/04/22 285 lb (129.3 kg)     BP Readings from Last 3 Encounters:   08/15/22 132/86   06/08/22 122/78   05/04/22 (!) 142/90       Physical Exam  Constitutional:       General: She is not in acute distress. Appearance: She is well-developed. HENT:      Head: Normocephalic. Neurological:      Mental Status: She is alert and oriented to person, place, and time. Psychiatric:         Behavior: Behavior normal.         Thought Content: Thought content normal.         Judgment: Judgment normal.          Assessment/Plan       Shania was seen today for weight loss and medication refill.     Diagnoses and all orders for this visit:    Class 3 severe obesity due to excess calories with serious comorbidity and body mass index (BMI) of 40.0 to 44.9 in [facility])      Refilled meds   Recheck in one month  Fasting for lipids next visit                     Merced Rodriguez DO

## 2022-09-08 ENCOUNTER — OFFICE VISIT (OUTPATIENT)
Dept: RHEUMATOLOGY | Age: 57
End: 2022-09-08
Payer: MEDICARE

## 2022-09-08 VITALS — HEART RATE: 76 BPM | BODY MASS INDEX: 42.76 KG/M2 | WEIGHT: 273 LBS

## 2022-09-08 DIAGNOSIS — M19.90 INFLAMMATORY ARTHRITIS: Primary | ICD-10-CM

## 2022-09-08 DIAGNOSIS — M15.9 PRIMARY OSTEOARTHRITIS INVOLVING MULTIPLE JOINTS: ICD-10-CM

## 2022-09-08 DIAGNOSIS — Z79.899 LONG-TERM USE OF PLAQUENIL: ICD-10-CM

## 2022-09-08 PROCEDURE — 4004F PT TOBACCO SCREEN RCVD TLK: CPT | Performed by: INTERNAL MEDICINE

## 2022-09-08 PROCEDURE — G8417 CALC BMI ABV UP PARAM F/U: HCPCS | Performed by: INTERNAL MEDICINE

## 2022-09-08 PROCEDURE — 3017F COLORECTAL CA SCREEN DOC REV: CPT | Performed by: INTERNAL MEDICINE

## 2022-09-08 PROCEDURE — 99214 OFFICE O/P EST MOD 30 MIN: CPT | Performed by: INTERNAL MEDICINE

## 2022-09-08 PROCEDURE — G8427 DOCREV CUR MEDS BY ELIG CLIN: HCPCS | Performed by: INTERNAL MEDICINE

## 2022-09-08 RX ORDER — HYDROXYCHLOROQUINE SULFATE 200 MG/1
TABLET, FILM COATED ORAL
Qty: 60 TABLET | Refills: 5 | Status: SHIPPED | OUTPATIENT
Start: 2022-09-08

## 2022-09-08 NOTE — PROGRESS NOTES
2022   Patient Name: Lashawn Sinha  : 1965  Medical Record: [id number]    MEDICATIONS  Current Outpatient Medications   Medication Sig Dispense Refill    hydroxychloroquine (PLAQUENIL) 200 MG tablet Take 1 tablet by mouth twice daily. 60 tablet 5    diclofenac sodium (VOLTAREN) 1 % GEL Apply 4 grams to lower extremity joints and 2 grams to upper extremity joints as needed 4 times/day 500 g 5    levothyroxine (SYNTHROID) 300 MCG tablet TAKE ONE TABLET BY MOUTH DAILY 90 tablet 1    simvastatin (ZOCOR) 40 MG tablet TAKE ONE TABLET BY MOUTH DAILY 30 tablet 5    hydroCHLOROthiazide (HYDRODIURIL) 25 MG tablet TAKE ONE TABLET BY MOUTH EVERY MORNING 30 tablet 5    folic acid (FOLVITE) 182 MCG tablet Take 400 mcg by mouth daily       vitamin D (CHOLECALCIFEROL) 1000 UNIT TABS tablet Take 1,000 Units by mouth daily      vitamin B-12 (CYANOCOBALAMIN) 1000 MCG tablet Take 1,000 mcg by mouth daily      Biotin 55877 MCG TABS Take by mouth       No current facility-administered medications for this visit. ALLERGIES  Allergies   Allergen Reactions    Glucosamine Chondroitin Complx [Glucosamine-Chondroitin] Swelling     \"Due to sulfa allergy\" per pt    Sulfa Antibiotics Itching, Swelling and Other (See Comments)     Pt states gets really hot like she is on fire,and swelling of face,hands, and feet    Erythromycin Swelling         Comments  No specialty comments available. Background history:  Lashawn Sinha is a 60 y.o. female who is being seen for follow up evaluation of  joint pain. She is complaining of pain in the joints which started 2 weeks ago. She has pain in various joints but is worse in the hips, back, knee, shoulders, hands and wrists. She describes her pain as constant, 2 out of 10, aching associated with swelling and stiffness. She tried meloxicam with minimal relief. She was placed on steroids for 1 week pain initially improved then started returning.  She has a morning stiffness lasting for 2-3 hours. Father has rheumatoid arthritis.  -She also has a rash on both shins for past 2 years. The rash is getting worse. She saw the dermatologist and was not given any diagnosis. She had a biopsy by PCP which showed crushed inflammatory epidermis and showed possible dermal hypersensitivity reaction to a drug or other systemic agent. Rash is associated with itching and is scaly. She denies history of psoriasis, inflammatory bowel disease, inflammatory back pain, uveitis, enthesitis, tenosynovitis. There is no history of photosensitivity, malar rash, pleurisy or pericarditis, blood clots, miscarriages, raynaud's. She is complaining of tingling and numbness in both feet. She denies any weakness, wrist or foot drop  -workup shows negative RF, TYRELL, normal ESR. Interim history: She presents for follow-up of inflammatory arthritis and osteoarthritis. She was last seen 1 year ago. She is on Plaquenil 200 mg twice a day day. She is no longer taking naproxen due to fluctuating kidney function. She gets intermittent achiness in the joints with weather changes. She denies daily joint pain, swelling and stiffness. Recent eye exam was normal.  She denies any side effects with Plaquenil. She denies any recent fevers or infection    blood work shows negative RF, CCP, TYRELL. HPI  ROS    REVIEW OF SYSTEMS: positive for fatigue, ringing in ears, dry mouth, anxiety and depression, sleep disturbance  Constitutional: No unanticipated weight loss or fevers. Integumentary: No photosensitivity, malar rash, livedo reticularis, and Raynaud's symptoms, sclerodactyly, skin tightening  Eyes: negative for dry eyes, visual disturbance and persistent redness, discharge from eyes   ENT: - No loss of hearing, vertigo, or recurrent ear infections.  - No history of nasal/oral ulcers.   Cardiovascular: No history of pericarditis, chest pain or murmur or palpitations  Respiratory: No shortness of breath, cough or history of interstitial lung disease. No history of pleurisy. No history of tuberculosis or atypical infections. Gastrointestinal: No history of heart burn, dysphagia or esophageal dysmotility. No inflammatory bowel disease. Genitourinary: No history renal disease, miscarriages. Hematologic/Lymphatic: No bleeding, blood clots or swollen lymph nodes. Neurological: No history seizure or focal weakness. No history of neuropathies, hyperesthesias, facial droop, diplopia  Psychiatric: No history of bipolar disease  Endocrine: Denies any parathyroid disease and osteoporosis  Allergic/Immunologic: No nasal congestion or hives. I have reviewed patients Past medical History, Social History and Family History as mentioned in her chart and this remains unchanged from previous.     Past Medical History:   Diagnosis Date    Anemia     Anesthesia complication     severe headache after woke up after wisdom teeth    Anxiety and depression     Chronic back pain     Condyloma acuminatum     Headache(784.0)     Hyperlipemia, mixed     Hypertension     Hypertension, essential     Hypothyroidism     Hypothyroidism     IBS (irritable bowel syndrome)     Metrorrhagia     Morbid obesity with BMI of 40.0-44.9, adult (HCC)     Neuropathy     Obstructive sleep apnea, adult     cpap    Osteoarthritis     Pre-operative clearance     Urgency of urination     Wears glasses     driving at night/reading     Past Surgical History:   Procedure Laterality Date    CATARACT REMOVAL      HIP SURGERY  2016, 2020    LEEP      NERVE BLOCK Right 02/18/2022    RIGHT SCIATIC NERVE BLOCK performed by North Thomson MD at [address] Left 03/12/2021    LUMBAR EPIDURAL STEROID INJECTION LEFT L4-5 performed by North Thomson MD at [address] Left 08/16/2016    TOTAL HIP ARTHROPLASTY Right 11/16/2020    RIGHT LATERAL TOTAL HIP REPLACEMENT performed by Francisco Javier Orozco MD at [place] [address] WISDOM TOOTH EXTRACTION       Social History     Socioeconomic History    Marital status:      Spouse name: Not on file    Number of children: 2    Years of education: Not on file    Highest education level: Not on file   Occupational History    Occupation:    Tobacco Use    Smoking status: Every Day     Packs/day: 1.00     Years: 30.00     Pack years: 30.00     Types: Cigarettes    Smokeless tobacco: Never    Tobacco comments:     encouraged to stop smoking    Vaping Use    Vaping Use: Never used   Substance and Sexual Activity    Alcohol use:  Yes     Alcohol/week: 0.0 standard drinks     Comment: rare    Drug use: Never    Sexual activity: Not Currently   Other Topics Concern    Not on file   Social History Narrative    Not on file     Social Determinants of Health     Financial Resource Strain: Not on file   Food Insecurity: Not on file   Transportation Needs: Not on file   Physical Activity: Insufficiently Active    Days of Exercise per Week: 1 day    Minutes of Exercise per Session: 10 min   Stress: Not on file   Social Connections: Not on file   Intimate Partner Violence: Not on file   Housing Stability: Not on file     Family History   Problem Relation Age of Onset    Heart Disease Mother         cardiac arrhythmia    High Blood Pressure Mother     Thyroid Disease Mother     Hypertension Mother     Cancer Mother     Other Mother         blood clots    Migraines Mother     Arthritis Mother     Arthritis Father     Heart Disease Father         cabg    Diabetes Father     Stroke Father     Hypertension Father     Other Father         blood clots    Cancer Sister         breast cancer    Cancer Brother         lung    Diabetes Other     High Blood Pressure Other     Stroke Other     Cancer Other         Bone    Alcohol Abuse Other              PHYSICAL EXAM   Vitals:    09/08/22 1333   Pulse: 76   Weight: 273 lb (123.8 kg)     Physical Exam  Constitutional:  Well developed, well nourished, no acute distress, non-toxic appearance   Musculoskeletal:    RIGHT  Swell  Tender  ROM  LEFT  Swell  Tender  ROM    DIP2  0  0  [place]  0  0  [place]den   DIP3  0  0  [place]  0  0  [place]   DIP4  0  0  [place]  0  0  [place]   DIP5  0  0  FULL   0  0  FULL    PIP1  0  0  FULL   0  0  FULL    PIP2  0  0  FULL   0  0  FULL    PIP3  0  0  FULL   0  0  FULL    PIP4  0  0  FULL   0  0  FULL    PIP5  0  0  FULL   0  0  FULL    MCP1  0  0  FULL   0  0  FULL    MCP2  0 + FULL   0 0 FULL    MCP3  0 +  FULL   0 0 FULL    MCP4  0 0 FULL   0 0 FULL    MCP5  0 0 FULL   0  0  FULL    Wrist  0  0  FULL   0  0  FULL    Elbow  0  0  FULL   0  0  FULL    Shouldr  0  0  FULL   0  0  FULL    Hip  0  0  FULL   0  0  FULL    Knee  0  0  crepitus  0  0  Crepitus   Ankle  0  0  FULL   0  0  FULL    MTP1  0  0  FULL   0  0  FULL    MTP2  0  0  FULL   0  0  FULL    MTP3  0  0  FULL   0  0  FULL    MTP4  0  0  FULL   0  0  FULL    MTP5  0  0  FULL   0  0  FULL    IP1  0  0  FULL   0  0  FULL    IP2  0  0  FULL   0  0  FULL    IP3  0  0  FULL   0  0  FULL    IP4  0  0  FULL   0  0  FULL    IP5  0  0  FULL   0 0 FULL       Ambulates without assistance, normal gait  Neck: Full ROM, no tenderness, supple   Back- no tenderness. Eyes:  PERRL, extra ocular movements intact, conjunctiva normal   HEENT:  Atraumatic, normocephalic, external ears normal, oropharynx moist, no pharyngeal exudates. Respiratory:  No respiratory distress  GI:  Soft, nondistended, normal bowel sounds, nontender, no organomegaly, no mass, no rebound, no guarding   :  No costovertebral angle tenderness   Integument:  Well hydrated, telangiectasias, erythematous patchy rash on bilateral shins with central clearing  Lymphatic:  No lymphadenopathy noted   Neurologic:   Alert & oriented x 3, CN 2-12 normal, no focal deficits noted. Sensations Intact. Muscle strength 5/5 proximally and distally in upper and lower extremities.    Psychiatric:  Speech and behavior appropriate             LABS AND IMAGING  Outside data reviewed and in HPI    Lab Results   Component Value Date/Time    WBC 9.8 05/04/2022 02:07 PM    RBC 4.73 05/04/2022 02:07 PM    HGB 15.3 05/04/2022 02:07 PM    HCT 45.0 05/04/2022 02:07 PM     05/04/2022 02:07 PM    MCV 95.0 05/04/2022 02:07 PM    MCH 32.4 05/04/2022 02:07 PM    MCHC 34.1 05/04/2022 02:07 PM    RDW 16.0 05/04/2022 02:07 PM    SEGSPCT 60.2 07/12/2010 05:35 PM    LYMPHOPCT 26.7 02/10/2021 01:02 AM    MONOPCT 7.9 02/10/2021 01:02 AM    EOSPCT 0.9 07/12/2010 05:35 PM    BASOPCT 0.4 02/10/2021 01:02 AM    MONOSABS 1.0 02/10/2021 01:02 AM    LYMPHSABS 3.2 02/10/2021 01:02 AM    EOSABS 0.5 02/10/2021 01:02 AM    BASOSABS 0.0 02/10/2021 01:02 AM    DIFFTYPE Auto-K 07/12/2010 05:35 PM       Chemistry        Component Value Date/Time     05/04/2022 1407    K 4.5 05/04/2022 1407    K 4.2 02/10/2021 0102    CL 97 (L) 05/04/2022 1407    CO2 29 05/04/2022 1407    BUN 19 05/04/2022 1407    CREATININE 0.8 05/04/2022 1407        Component Value Date/Time    CALCIUM 10.0 05/04/2022 1407    ALKPHOS 109 05/04/2022 1407    AST 18 05/04/2022 1407    ALT 21 05/04/2022 1407    BILITOT <0.2 05/04/2022 1407          Lab Results   Component Value Date    SEDRATE 19 11/09/2020     Lab Results   Component Value Date    CRP 3.7 05/07/2018     Lab Results   Component Value Date/Time    TYRELL Negative 04/27/2018 11:47 AM     Lab Results   Component Value Date/Time    RF <10.0 04/27/2018 11:47 AM    CCPABIGG 3 05/07/2018 12:16 PM     Lab Results   Component Value Date/Time    TYRELL Negative 04/27/2018 11:47 AM    ANAINT see below 04/27/2018 11:47 AM     No results found for: DSDNAG, DSDNAIGGIFA  Lab Results   Component Value Date/Time    SSALAAB 0 05/17/2018 02:38 PM     No results found for: SMAB, RNPAB  No results found for: CENTABIGG  No results found for: C3, C4, ACE  Lab Results   Component Value Date/Time    VITD25 38.2 02/25/2021 03:37 PM       ASSESSMENT AND PLAN      Assessment/Plan:      ASSESSMENT:    1. Inflammatory arthritis    2. Primary osteoarthritis involving multiple joints    3. Long-term use of Plaquenil        PLAN:    Diagnosis Orders   1. Inflammatory arthritis  hydroxychloroquine (PLAQUENIL) 200 MG tablet    Comprehensive Metabolic Panel      2. Primary osteoarthritis involving multiple joints  diclofenac sodium (VOLTAREN) 1 % GEL      3. Long-term use of Plaquenil           1. Inflammatory arthritis  Most likely possibility seronegative rheumatoid arthritis  -no Active synovitis   -RF, CCP negative, no erosions on x-rays  -Continue Plaquenil 200 mg twice a day  Up-to-date with eye exam    2. Primary osteoarthritis involving multiple joints  Intermittent symptoms mostly due to weather changes  Unable to take oral NSAIDs due to fluctuating kidney function  I will start diclofenac gel as needed  Tylenol as needed, do not exceed 3 g daily    3. Long-term use of Plaquenil  Up-to-date with eye exam      The patient indicates understanding of these issues and agrees with the plan. Return in about 6 months (around 3/8/2023). The risks and benefits of my recommendations, as well as other treatment options, benefits and side effects were discussed with the patient. All questions were answered. ######################################################################    I thank you for giving me the opportunity to participate in [address]. If you have any questions or concerns please feel free to contact me. I look forward to following  Lakeshia Mendoza along with you. Electronically signed by: Loreto Patiño MD, MD, 9/8/2022 2:01 PM    Documentation was done using voice recognition dragon software. Every effort was made to ensure accuracy; however, inadvertent unintentional computerized transcription errors may be present.

## 2022-09-08 NOTE — PATIENT INSTRUCTIONS
Labs   Diclofenac gel as needed   Tylenol 1000 mg every 8 hours, do not exceed 3 grams daily    Plaquenil

## 2022-09-12 RX ORDER — LEVOTHYROXINE SODIUM 300 UG/1
TABLET ORAL
Qty: 90 TABLET | Refills: 1 | Status: SHIPPED | OUTPATIENT
Start: 2022-09-12

## 2022-09-16 ENCOUNTER — TELEPHONE (OUTPATIENT)
Dept: FAMILY MEDICINE CLINIC | Age: 57
End: 2022-09-16

## 2022-09-16 NOTE — TELEPHONE ENCOUNTER
Spoke with patient and inquired if she has received cologuard. Patient confirmed she has- reminded her to complete. Patient verbalized understanding and will call back with any questions or concerns.

## 2022-10-06 RX ORDER — SIMVASTATIN 40 MG
TABLET ORAL
Qty: 30 TABLET | Refills: 5 | Status: SHIPPED | OUTPATIENT
Start: 2022-10-06

## 2022-10-06 RX ORDER — HYDROCHLOROTHIAZIDE 25 MG/1
TABLET ORAL
Qty: 30 TABLET | Refills: 5 | Status: SHIPPED | OUTPATIENT
Start: 2022-10-06

## 2022-10-06 NOTE — TELEPHONE ENCOUNTER
Last office visit 8/15/2022     Last written      Next office visit scheduled Visit date not found    Requested Prescriptions     Pending Prescriptions Disp Refills    simvastatin (ZOCOR) 40 MG tablet [Pharmacy Med Name: SIMVASTATIN 40 MG TABLET] 30 tablet 5     Sig: TAKE ONE TABLET BY MOUTH DAILY    hydroCHLOROthiazide (HYDRODIURIL) 25 MG tablet [Pharmacy Med Name: hydroCHLOROthiazide 25 MG TABLET] 30 tablet 5     Sig: TAKE ONE TABLET BY MOUTH EVERY MORNING

## 2022-11-23 ENCOUNTER — NURSE ONLY (OUTPATIENT)
Dept: FAMILY MEDICINE CLINIC | Age: 57
End: 2022-11-23
Payer: MEDICARE

## 2022-11-23 PROCEDURE — G0008 ADMIN INFLUENZA VIRUS VAC: HCPCS | Performed by: FAMILY MEDICINE

## 2022-11-23 PROCEDURE — 90674 CCIIV4 VAC NO PRSV 0.5 ML IM: CPT | Performed by: FAMILY MEDICINE

## 2022-11-27 ENCOUNTER — HOSPITAL ENCOUNTER (EMERGENCY)
Age: 57
Discharge: LEFT BEFORE DISCHARGE | End: 2022-11-28
Payer: MEDICARE

## 2022-11-27 ENCOUNTER — APPOINTMENT (OUTPATIENT)
Dept: GENERAL RADIOLOGY | Age: 57
End: 2022-11-27
Payer: MEDICARE

## 2022-11-27 VITALS
HEART RATE: 89 BPM | BODY MASS INDEX: 37.89 KG/M2 | WEIGHT: 250 LBS | RESPIRATION RATE: 20 BRPM | OXYGEN SATURATION: 95 % | TEMPERATURE: 97.6 F | HEIGHT: 68 IN | SYSTOLIC BLOOD PRESSURE: 150 MMHG | DIASTOLIC BLOOD PRESSURE: 82 MMHG
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
## 2022-11-27 DIAGNOSIS — M25.561 ACUTE PAIN OF RIGHT KNEE: ICD-10-CM

## 2022-11-27 DIAGNOSIS — Z53.21 ELOPED FROM EMERGENCY DEPARTMENT: Primary | ICD-10-CM

## 2022-11-27 PROCEDURE — 73560 X-RAY EXAM OF KNEE 1 OR 2: CPT

## 2022-11-27 PROCEDURE — 99283 EMERGENCY DEPT VISIT LOW MDM: CPT

## 2022-11-27 PROCEDURE — 6370000000 HC RX 637 (ALT 250 FOR IP): Performed by: PHYSICIAN ASSISTANT

## 2022-11-27 RX ORDER — LIDOCAINE 4 G/G
1 PATCH TOPICAL DAILY
Status: DISCONTINUED | OUTPATIENT
Start: 2022-11-28 | End: 2022-11-28 | Stop reason: HOSPADM

## 2022-11-27 RX ORDER — ACETAMINOPHEN 325 MG/1
650 TABLET ORAL ONCE
Status: COMPLETED | OUTPATIENT
Start: 2022-11-27 | End: 2022-11-27

## 2022-11-27 RX ORDER — METHOCARBAMOL 500 MG/1
1000 TABLET, FILM COATED ORAL ONCE
Status: COMPLETED | OUTPATIENT
Start: 2022-11-27 | End: 2022-11-27

## 2022-11-27 RX ADMIN — ACETAMINOPHEN 650 MG: 325 TABLET ORAL at 23:01

## 2022-11-27 RX ADMIN — METHOCARBAMOL 1000 MG: 500 TABLET ORAL at 23:00

## 2022-11-27 ASSESSMENT — PAIN SCALES - GENERAL
PAINLEVEL_OUTOF10: 5
PAINLEVEL_OUTOF10: 5

## 2022-11-27 ASSESSMENT — PAIN DESCRIPTION - LOCATION: LOCATION: LEG

## 2022-11-27 ASSESSMENT — PAIN DESCRIPTION - ORIENTATION: ORIENTATION: RIGHT

## 2022-11-27 NOTE — Clinical Note
Luis Castro was seen and treated in our emergency department on 11/27/2022. She may return to work on 11/30/2022. If you have any questions or concerns, please don't hesitate to call.       Christiano Ritchie

## 2022-11-28 RX ORDER — LIDOCAINE 4 G/G
1 PATCH TOPICAL DAILY
Qty: 30 PATCH | Refills: 0 | Status: SHIPPED
Start: 2022-11-28 | End: 2022-11-28 | Stop reason: CLARIF

## 2022-11-28 RX ORDER — METHOCARBAMOL 500 MG/1
500 TABLET, FILM COATED ORAL 4 TIMES DAILY
Qty: 40 TABLET | Refills: 0 | Status: SHIPPED
Start: 2022-11-28 | End: 2022-11-28 | Stop reason: CLARIF

## 2022-11-28 ASSESSMENT — ENCOUNTER SYMPTOMS
SHORTNESS OF BREATH: 0
VOMITING: 0
COLOR CHANGE: 0
COUGH: 0
ABDOMINAL PAIN: 0
NAUSEA: 0

## 2022-11-28 NOTE — ED PROVIDER NOTES
[address] [facility]        Pt Name: Kisha Brown  MRN: [id number]  [place] 1965  Date of evaluation: 11/27/2022  Provider: ESTEFANY Hill  PCP: Josy Conklin DO  Note Started: 12:22 AM EST 11/28/22      BRIGITTE. I have evaluated this patient. My supervising physician was available for consultation. CHIEF COMPLAINT       Chief Complaint   Patient presents with    Leg Injury     Patient slipped and fell this morning now with right knee pain and leg pain. Patient states, \" was able to leave earlier but now not able to place weight on knee/leg. \"       HISTORY OF PRESENT ILLNESS   (Location, Timing/Onset, Context/Setting, Quality, Duration, Modifying Factors, Severity, Associated Signs and Symptoms)  Note limiting factors. Chief Complaint: right leg pain     Kisha Brown is a 59 y.o. female who presents to the emergency department today with complaints of right leg pain. Patient reports that this morning she slipped and fell, with the lower part of her leg going 1 direction and her upper body going the other way. She states that she was able to get herself up, and get herself to her family members wrestling [employer]. However while sitting and waiting in the bleachers for 6 hours watching the tournament she began to get some stiffening up of her right knee. She states that she then drove herself home, and when she got to her house she had to go down 4 steps and then of 3 steps, but at that point was unable to maneuver anymore due to pain in her right knee, so she called EMS and was brought in for evaluation. She has no numbness, tingling-she does have neuropathy but reports no change from baseline. She has no further complaints. Nursing Notes were all reviewed and agreed with or any disagreements were addressed in the HPI.     REVIEW OF SYSTEMS    (2-9 systems for level 4, 10 or more for level 5)     Review of Systems Constitutional:  Negative for chills and fever. Respiratory:  Negative for cough and shortness of breath. Cardiovascular:  Negative for chest pain and palpitations. Gastrointestinal:  Negative for abdominal pain, nausea and vomiting. Genitourinary:  Negative for dysuria, frequency and urgency. Musculoskeletal:  Positive for arthralgias and gait problem (due to right knee pain). Skin:  Negative for color change and rash. Neurological:  Negative for dizziness, weakness, light-headedness and numbness. Psychiatric/Behavioral:  Negative for agitation and confusion. Positives and Pertinent negatives as per HPI. Except as noted above in the ROS, all other systems were reviewed and negative.        PAST MEDICAL HISTORY     Past Medical History:   Diagnosis Date    Anemia     Anesthesia complication     severe headache after woke up after wisdom teeth    Anxiety and depression     Chronic back pain     Condyloma acuminatum     Headache(784.0)     Hyperlipemia, mixed     Hypertension     Hypertension, essential     Hypothyroidism     Hypothyroidism     IBS (irritable bowel syndrome)     Metrorrhagia     Morbid obesity with BMI of 40.0-44.9, adult (HCC)     Neuropathy     Obstructive sleep apnea, adult     cpap    Osteoarthritis     Pre-operative clearance     Urgency of urination     Wears glasses     driving at night/reading         SURGICAL HISTORY     Past Surgical History:   Procedure Laterality Date    CATARACT REMOVAL      HIP SURGERY  2016, 2020    LEEP      NERVE BLOCK Right 02/18/2022    RIGHT SCIATIC NERVE BLOCK performed by Katherine Simpson MD at [address] Left 03/12/2021    LUMBAR EPIDURAL STEROID INJECTION LEFT L4-5 performed by Katherine Simpson MD at [address] Left 08/16/2016    TOTAL HIP ARTHROPLASTY Right 11/16/2020    RIGHT LATERAL TOTAL HIP REPLACEMENT performed by Kay Navarro MD at [place] [address] WISDOM TOOTH EXTRACTION           CURRENTMEDICATIONS       Previous Medications    BIOTIN 06396 MCG TABS    Take by mouth    DICLOFENAC SODIUM (VOLTAREN) 1 % GEL    Apply 4 grams to lower extremity joints and 2 grams to upper extremity joints as needed 4 times/day    FOLIC ACID (FOLVITE) 369 MCG TABLET    Take 400 mcg by mouth daily     HYDROCHLOROTHIAZIDE (HYDRODIURIL) 25 MG TABLET    TAKE ONE TABLET BY MOUTH EVERY MORNING    HYDROXYCHLOROQUINE (PLAQUENIL) 200 MG TABLET    Take 1 tablet by mouth twice daily. LEVOTHYROXINE (SYNTHROID) 300 MCG TABLET    TAKE ONE TABLET BY MOUTH DAILY    SIMVASTATIN (ZOCOR) 40 MG TABLET    TAKE ONE TABLET BY MOUTH DAILY    VITAMIN B-12 (CYANOCOBALAMIN) 1000 MCG TABLET    Take 1,000 mcg by mouth daily    VITAMIN D (CHOLECALCIFEROL) 1000 UNIT TABS TABLET    Take 1,000 Units by mouth daily         ALLERGIES     Glucosamine chondroitin complx [glucosamine-chondroitin], Sulfa antibiotics, and Erythromycin    FAMILYHISTORY       Family History   Problem Relation Age of Onset    Heart Disease Mother         cardiac arrhythmia    High Blood Pressure Mother     Thyroid Disease Mother     Hypertension Mother     Cancer Mother     Other Mother         blood clots    Migraines Mother     Arthritis Mother     Arthritis Father     Heart Disease Father         cabg    Diabetes Father     Stroke Father     Hypertension Father     Other Father         blood clots    Cancer Sister         breast cancer    Cancer Brother         lung    Diabetes Other     High Blood Pressure Other     Stroke Other     Cancer Other         Bone    Alcohol Abuse Other           SOCIAL HISTORY       Social History     Tobacco Use    Smoking status: Every Day     Packs/day: 1.00     Years: 30.00     Pack years: 30.00     Types: Cigarettes    Smokeless tobacco: Never    Tobacco comments:     encouraged to stop smoking    Vaping Use    Vaping Use: Never used   Substance Use Topics    Alcohol use:  Yes     Alcohol/week: 0.0 standard drinks     Comment: rare    Drug use: Never       SCREENINGS    McIntire Coma Scale  Eye Opening: Spontaneous  Best Verbal Response: Oriented  Best Motor Response: Obeys commands  McIntire Coma Scale Score: 15        PHYSICAL EXAM    (up to 7 for level 4, 8 or more for level 5)     ED Triage Vitals [11/27/22 2215]   BP Temp Temp Source Heart Rate Resp SpO2 Height Weight   (!) 150/82 97.6 °F (36.4 °C) Oral 89 20 95 % 5' 7.5\" (1.715 m) 250 lb (113.4 kg)       Physical Exam  Vitals and nursing note reviewed. Constitutional:       General: She is not in acute distress. Appearance: Normal appearance. She is well-developed. She is not ill-appearing, toxic-appearing or diaphoretic. HENT:      Head: Normocephalic and atraumatic. Mouth/Throat:      Mouth: Mucous membranes are moist.      Pharynx: Oropharynx is clear. Eyes:      Conjunctiva/sclera: Conjunctivae normal.      Pupils: Pupils are equal, round, and reactive to light. Cardiovascular:      Pulses:           Dorsalis pedis pulses are 2+ on the right side. Posterior tibial pulses are 2+ on the right side. Pulmonary:      Effort: Pulmonary effort is normal. No respiratory distress. Abdominal:      General: Bowel sounds are normal.      Palpations: Abdomen is soft. Musculoskeletal:      Right hip: No deformity or tenderness. Left hip: No deformity or tenderness. Right upper leg: No deformity or tenderness. Right knee: No erythema or ecchymosis. Normal range of motion (can flex and extend, but with increased pain). Tenderness (lateral aspect towards posterior aspect) present. No patellar tendon tenderness. Normal alignment. Normal pulse. Right lower leg: No swelling or tenderness. No edema. Left lower leg: No edema. Right ankle: Normal.   Skin:     General: Skin is warm and dry. Neurological:      General: No focal deficit present.       Mental Status: She is alert and oriented to person, place, and time.   Psychiatric:         Mood and Affect: Mood normal.         Behavior: Behavior normal. Behavior is cooperative. DIAGNOSTIC RESULTS   LABS:    Labs Reviewed - No data to display    When ordered only abnormal lab results are displayed. All other labs were within normal range or not returned as of this dictation. EKG: When ordered, EKG's are interpreted by the Emergency Department Physician in the absence of a cardiologist.  Please see their note for interpretation of EKG. RADIOLOGY:   Non-plain film images such as CT, Ultrasound and MRI are read by the radiologist. Plain radiographic images are visualized and preliminarily interpreted by the ED Provider with the below findings:        Interpretation per the Radiologist below, if available at the time of this note:    XR KNEE RIGHT (1-2 VIEWS)   Final Result   1. No acute osseous abnormality the right knee evident. 2. Moderate joint effusion. 3. Mild tricompartment osteoarthritis. XR KNEE RIGHT (1-2 VIEWS)    Result Date: 11/27/2022  EXAMINATION: 2 XRAY VIEWS OF THE RIGHT KNEE 11/27/2022 10:38 pm COMPARISON: 06/21/2016 HISTORY: ORDERING SYSTEM PROVIDED HISTORY: knee injury TECHNOLOGIST PROVIDED HISTORY: Reason for exam:->knee injury Reason for Exam: fall FINDINGS: Alignment is normal. There is no acute fracture. There is a moderate joint effusion. Mild tricompartment joint space narrowing osteophyte formation is noted. No intra-articular loose bodies are identified. 1. No acute osseous abnormality the right knee evident. 2. Moderate joint effusion. 3. Mild tricompartment osteoarthritis.            PROCEDURES   Unless otherwise noted below, none     Procedures    CONSULTS:  None      EMERGENCY DEPARTMENT COURSE and DIFFERENTIAL DIAGNOSIS/MDM:   Vitals:    Vitals:    11/27/22 2215   BP: (!) 150/82   Pulse: 89   Resp: 20   Temp: 97.6 °F (36.4 °C)   TempSrc: Oral   SpO2: 95%   Weight: 250 lb (113.4 kg)   Height: 5' 7.5\" (1.715 m) Patient was given the following medications:  Medications   lidocaine 4 % external patch 1 patch (1 patch TransDERmal Patch Applied 11/27/22 2302)   methocarbamol (ROBAXIN) tablet 1,000 mg (1,000 mg Oral Given 11/27/22 2300)   acetaminophen (TYLENOL) tablet 650 mg (650 mg Oral Given 11/27/22 2301)         Is this patient to be included in the SEP-1 Core Measure due to severe sepsis or septic shock? No   Exclusion criteria - the patient is NOT to be included for SEP-1 Core Measure due to: Infection is not suspected    ED COURSE & MEDICAL DECISION MAKING    - The patient presented to the ER with complaints of right knee/leg pain. Vital signs were reviewed. Exam as above. Imaging ordered. - Pertinent Labs & Imaging studies reviewed. (See chart for details)   -  Patient seen and evaluated in the emergency department. -  Triage and nursing notes reviewed and incorporated. -  Old chart records reviewed and incorporated. -  BRIGITTE. I have evaluated this patient. My supervising physician was available for consultation.  -  Differential diagnosis includes: abrasion/laceration, contusion, fracture, sprain/strain, dislocation  -  Work-up included:  See above  -  ED treatment included:  robaxin, tylenol, lidocaine patch (patient reports that due to previous surgeries she has built up a tolerance to both Norco and Percocet, and it does not really work for her and she would prefer not to take these medications). -  Results discussed with patient and/or family. Imaging studies show no acute osseous abnormality in the right knee, she does have a moderate joint effusion and mild tricompartmental osteoarthritis. At this time, we recommend discharge, as the patient is a stable for outpatient management. However, at the time that we went to reevaluate the patient, she was unable to be found and it would appear that she has eloped to the emergency department.   She did not receive the crutches, nor did she hear about getting a referral to orthopedics.  -  Disposition: Eloped from ED    - Critical Care: 0 minutes      FINAL IMPRESSION      1. Eloped from emergency department    2. Acute pain of right knee    3.  Fall on same level from slipping, tripping or stumbling, initial encounter          DISPOSITION/PLAN   DISPOSITION Eloped - Left Before Treatment Complete 11/28/2022 01:18:53 AM      PATIENT REFERRED TO:  Willian Oneil DO  [address] [place] [address] [address]  [address] [facility] [address]  [phone]    Schedule an appointment as soon as possible for a visit       Alexandre Miller MD  [address] [place] [address]  [address] [facility] [address]  [phone]    Schedule an appointment as soon as possible for a visit       [facility] Emergency Department  [address] [place] [address]  [phone]    If symptoms worsen    DISCHARGE MEDICATIONS:  New Prescriptions    No medications on file       DISCONTINUED MEDICATIONS:  Discontinued Medications    No medications on file              (Please note that portions of this note were completed with a voice recognition program.  Efforts were made to edit the dictations but occasionally words are mis-transcribed.)    ESTEFANY Jasmine (electronically signed)           Christiano Jasmine  11/28/22 0120

## 2022-11-29 ENCOUNTER — COMMUNITY OUTREACH (OUTPATIENT)
Dept: FAMILY MEDICINE CLINIC | Age: 57
End: 2022-11-29

## 2022-11-29 NOTE — PROGRESS NOTES
Patient's HM shows they are overdue for Mammogram, Colorectal Screening and Cervical Cancer Screening. [employer] and  files searched. No results to attach to order nor HM updated.

## 2023-01-27 DIAGNOSIS — M19.90 INFLAMMATORY ARTHRITIS: ICD-10-CM

## 2023-01-30 RX ORDER — HYDROXYCHLOROQUINE SULFATE 200 MG/1
TABLET, FILM COATED ORAL
Qty: 60 TABLET | Refills: 3 | Status: SHIPPED | OUTPATIENT
Start: 2023-01-30

## 2023-03-01 ENCOUNTER — TELEPHONE (OUTPATIENT)
Dept: FAMILY MEDICINE CLINIC | Age: 58
End: 2023-03-01

## 2023-03-05 RX ORDER — LEVOTHYROXINE SODIUM 300 UG/1
TABLET ORAL
Qty: 90 TABLET | Refills: 1 | Status: SHIPPED | OUTPATIENT
Start: 2023-03-05

## 2023-03-07 ENCOUNTER — TELEPHONE (OUTPATIENT)
Dept: FAMILY MEDICINE CLINIC | Age: 58
End: 2023-03-07

## 2023-03-07 DIAGNOSIS — N63.20 MASS OF LEFT BREAST, UNSPECIFIED QUADRANT: Primary | ICD-10-CM

## 2023-03-07 NOTE — TELEPHONE ENCOUNTER
Patient called stating left breast pain yesterday and after self breast exam patient states she found a lump. Patient inquiring \"what she should do\". Patient states she is very concerned because her sister  from breast cancer.  Requesting a return call today

## 2023-03-16 ENCOUNTER — HOSPITAL ENCOUNTER (OUTPATIENT)
Dept: WOMENS IMAGING | Age: 58
Discharge: HOME | End: 2023-03-16
Payer: MEDICARE

## 2023-03-16 ENCOUNTER — HOSPITAL ENCOUNTER (OUTPATIENT)
Dept: ULTRASOUND IMAGING | Age: 58
Discharge: HOME | End: 2023-03-16
Payer: MEDICARE

## 2023-03-16 DIAGNOSIS — N63.20 MASS OF LEFT BREAST, UNSPECIFIED QUADRANT: ICD-10-CM

## 2023-03-16 DIAGNOSIS — R92.8 ABNORMAL MAMMOGRAM: ICD-10-CM

## 2023-03-16 PROCEDURE — G0279 TOMOSYNTHESIS, MAMMO: HCPCS

## 2023-03-16 PROCEDURE — 76642 ULTRASOUND BREAST LIMITED: CPT

## 2023-03-29 ENCOUNTER — OFFICE VISIT (OUTPATIENT)
Dept: FAMILY MEDICINE CLINIC | Age: 58
End: 2023-03-29

## 2023-03-29 VITALS
SYSTOLIC BLOOD PRESSURE: 110 MMHG | HEIGHT: 67 IN | BODY MASS INDEX: 43.08 KG/M2 | DIASTOLIC BLOOD PRESSURE: 74 MMHG | WEIGHT: 274.5 LBS

## 2023-03-29 DIAGNOSIS — E11.9 TYPE 2 DIABETES MELLITUS WITHOUT COMPLICATION, WITHOUT LONG-TERM CURRENT USE OF INSULIN (HCC): Primary | ICD-10-CM

## 2023-03-29 DIAGNOSIS — E11.40 TYPE 2 DIABETES MELLITUS WITH DIABETIC NEUROPATHY, WITHOUT LONG-TERM CURRENT USE OF INSULIN (HCC): ICD-10-CM

## 2023-03-29 DIAGNOSIS — E78.2 HYPERLIPEMIA, MIXED: ICD-10-CM

## 2023-03-29 DIAGNOSIS — R53.83 FATIGUE, UNSPECIFIED TYPE: ICD-10-CM

## 2023-03-29 DIAGNOSIS — E11.69 TYPE 2 DIABETES MELLITUS WITH OTHER SPECIFIED COMPLICATION, UNSPECIFIED WHETHER LONG TERM INSULIN USE (HCC): ICD-10-CM

## 2023-03-29 DIAGNOSIS — E11.22 TYPE 2 DIABETES MELLITUS WITH CHRONIC KIDNEY DISEASE, WITHOUT LONG-TERM CURRENT USE OF INSULIN, UNSPECIFIED CKD STAGE (HCC): ICD-10-CM

## 2023-03-29 DIAGNOSIS — E66.01 OBESITY, CLASS III, BMI 40-49.9 (MORBID OBESITY) (HCC): ICD-10-CM

## 2023-03-29 DIAGNOSIS — N18.30 STAGE 3 CHRONIC KIDNEY DISEASE, UNSPECIFIED WHETHER STAGE 3A OR 3B CKD (HCC): ICD-10-CM

## 2023-03-29 LAB
ALBUMIN SERPL-MCNC: 4.3 G/DL (ref 3.4–5)
ALBUMIN/GLOB SERPL: 1.7 {RATIO} (ref 1.1–2.2)
ALP SERPL-CCNC: 120 U/L (ref 40–129)
ALT SERPL-CCNC: 36 U/L (ref 10–40)
ANION GAP SERPL CALCULATED.3IONS-SCNC: 14 MMOL/L (ref 3–16)
AST SERPL-CCNC: 20 U/L (ref 15–37)
BILIRUB SERPL-MCNC: 0.3 MG/DL (ref 0–1)
BUN SERPL-MCNC: 20 MG/DL (ref 7–20)
CALCIUM SERPL-MCNC: 10 MG/DL (ref 8.3–10.6)
CHLORIDE SERPL-SCNC: 95 MMOL/L (ref 99–110)
CHOLEST SERPL-MCNC: 214 MG/DL (ref 0–199)
CO2 SERPL-SCNC: 29 MMOL/L (ref 21–32)
CREAT SERPL-MCNC: 1 MG/DL (ref 0.6–1.1)
GFR SERPLBLD CREATININE-BSD FMLA CKD-EPI: >60 ML/MIN/{1.73_M2}
GLUCOSE SERPL-MCNC: 113 MG/DL (ref 70–99)
HDLC SERPL-MCNC: 52 MG/DL (ref 40–60)
LDLC SERPL CALC-MCNC: ABNORMAL MG/DL
LDLC SERPL-MCNC: 121 MG/DL
POTASSIUM SERPL-SCNC: 4.2 MMOL/L (ref 3.5–5.1)
PROT SERPL-MCNC: 6.9 G/DL (ref 6.4–8.2)
SODIUM SERPL-SCNC: 138 MMOL/L (ref 136–145)
TRIGL SERPL-MCNC: 315 MG/DL (ref 0–150)
TSH SERPL DL<=0.005 MIU/L-ACNC: 3.53 UIU/ML (ref 0.27–4.2)
VLDLC SERPL CALC-MCNC: ABNORMAL MG/DL

## 2023-03-29 RX ORDER — GABAPENTIN 100 MG/1
100 CAPSULE ORAL 2 TIMES DAILY
Qty: 90 CAPSULE | Refills: 5 | Status: SHIPPED | OUTPATIENT
Start: 2023-03-29 | End: 2023-09-25

## 2023-03-29 RX ORDER — SEMAGLUTIDE 1.34 MG/ML
INJECTION, SOLUTION SUBCUTANEOUS
Qty: 1 ADJUSTABLE DOSE PRE-FILLED PEN SYRINGE | Refills: 1 | Status: SHIPPED | OUTPATIENT
Start: 2023-03-29

## 2023-03-29 RX ORDER — HYDROCHLOROTHIAZIDE 25 MG/1
25 TABLET ORAL EVERY MORNING
Qty: 90 TABLET | Refills: 2 | Status: SHIPPED | OUTPATIENT
Start: 2023-03-29

## 2023-03-29 SDOH — ECONOMIC STABILITY: FOOD INSECURITY: WITHIN THE PAST 12 MONTHS, THE FOOD YOU BOUGHT JUST DIDN'T LAST AND YOU DIDN'T HAVE MONEY TO GET MORE.: NEVER TRUE

## 2023-03-29 SDOH — ECONOMIC STABILITY: INCOME INSECURITY: HOW HARD IS IT FOR YOU TO PAY FOR THE VERY BASICS LIKE FOOD, HOUSING, MEDICAL CARE, AND HEATING?: NOT HARD AT ALL

## 2023-03-29 SDOH — ECONOMIC STABILITY: HOUSING INSECURITY
IN THE LAST 12 MONTHS, WAS THERE A TIME WHEN YOU DID NOT HAVE A STEADY PLACE TO SLEEP OR SLEPT IN A SHELTER (INCLUDING NOW)?: NO

## 2023-03-29 SDOH — ECONOMIC STABILITY: FOOD INSECURITY: WITHIN THE PAST 12 MONTHS, YOU WORRIED THAT YOUR FOOD WOULD RUN OUT BEFORE YOU GOT MONEY TO BUY MORE.: NEVER TRUE

## 2023-03-29 ASSESSMENT — PATIENT HEALTH QUESTIONNAIRE - PHQ9
SUM OF ALL RESPONSES TO PHQ QUESTIONS 1-9: 0
3. TROUBLE FALLING OR STAYING ASLEEP: 0
10. IF YOU CHECKED OFF ANY PROBLEMS, HOW DIFFICULT HAVE THESE PROBLEMS MADE IT FOR YOU TO DO YOUR WORK, TAKE CARE OF THINGS AT HOME, OR GET ALONG WITH OTHER PEOPLE: 0
SUM OF ALL RESPONSES TO PHQ9 QUESTIONS 1 & 2: 0
8. MOVING OR SPEAKING SO SLOWLY THAT OTHER PEOPLE COULD HAVE NOTICED. OR THE OPPOSITE, BEING SO FIGETY OR RESTLESS THAT YOU HAVE BEEN MOVING AROUND A LOT MORE THAN USUAL: 0
SUM OF ALL RESPONSES TO PHQ QUESTIONS 1-9: 0
7. TROUBLE CONCENTRATING ON THINGS, SUCH AS READING THE NEWSPAPER OR WATCHING TELEVISION: 0
9. THOUGHTS THAT YOU WOULD BE BETTER OFF DEAD, OR OF HURTING YOURSELF: 0
1. LITTLE INTEREST OR PLEASURE IN DOING THINGS: 0
5. POOR APPETITE OR OVEREATING: 0
6. FEELING BAD ABOUT YOURSELF - OR THAT YOU ARE A FAILURE OR HAVE LET YOURSELF OR YOUR FAMILY DOWN: 0
4. FEELING TIRED OR HAVING LITTLE ENERGY: 0
2. FEELING DOWN, DEPRESSED OR HOPELESS: 0

## 2023-03-29 NOTE — PROGRESS NOTES
mellitus with chronic kidney disease, without long-term current use of insulin, unspecified CKD stage ([facility] [address] [facility]    Type 2 diabetes mellitus with diabetic neuropathy, without long-term current use of insulin ([facility]    Other orders  -     hydroCHLOROthiazide (HYDRODIURIL) 25 MG tablet; Take 1 tablet by mouth every morning  -     Semaglutide,0.25 or 0.5MG/DOS, (OZEMPIC, 0.25 OR 0.5 MG/DOSE,) 2 MG/1.5ML SOPN; .25 mg sq weekly for four weeks then increase to .5 mg sq weekly  -     gabapentin (NEURONTIN) 100 MG capsule; Take 1 capsule by mouth 2 times daily for 180 days. Intended supply: 30 days      No follow-ups on file.           Starhiltone Anthony,

## 2023-03-30 LAB
EST. AVERAGE GLUCOSE BLD GHB EST-MCNC: 191.5 MG/DL
HBA1C MFR BLD: 8.3 %

## 2023-04-17 ENCOUNTER — TELEPHONE (OUTPATIENT)
Dept: FAMILY MEDICINE CLINIC | Age: 58
End: 2023-04-17

## 2023-04-17 NOTE — TELEPHONE ENCOUNTER
Spoke with Janee pharmacist from [employer]. They are calling regarding patients gabapentin prescription. Sig and quantity do not match. Gave verbal clarification patient should be taking 1 capsule by mouth twice daily for a quantity of 180.

## 2023-04-18 RX ORDER — SIMVASTATIN 40 MG
40 TABLET ORAL DAILY
Qty: 90 TABLET | Refills: 1 | Status: SHIPPED | OUTPATIENT
Start: 2023-04-18

## 2023-06-23 ENCOUNTER — TELEPHONE (OUTPATIENT)
Dept: FAMILY MEDICINE CLINIC | Age: 58
End: 2023-06-23

## 2023-06-23 NOTE — TELEPHONE ENCOUNTER
Patient requesting a return call regarding her dosage for Ozempic. Patient states each refill starts her at 0.25mg for a couple of weeks and then doses up to 0.5mg.  Next refill starts again at 0.25mg

## 2023-06-24 RX ORDER — SEMAGLUTIDE 1.34 MG/ML
0.5 INJECTION, SOLUTION SUBCUTANEOUS WEEKLY
Qty: 1 ADJUSTABLE DOSE PRE-FILLED PEN SYRINGE | Refills: 1 | Status: SHIPPED | OUTPATIENT
Start: 2023-06-24

## 2023-07-24 ENCOUNTER — OFFICE VISIT (OUTPATIENT)
Dept: ORTHOPEDIC SURGERY | Age: 58
End: 2023-07-24
Payer: MEDICARE

## 2023-07-24 VITALS — BODY MASS INDEX: 43 KG/M2 | WEIGHT: 274 LBS | HEIGHT: 67 IN

## 2023-07-24 DIAGNOSIS — M25.561 RIGHT KNEE PAIN, UNSPECIFIED CHRONICITY: Primary | ICD-10-CM

## 2023-07-24 DIAGNOSIS — M17.11 ARTHRITIS OF KNEE, RIGHT: ICD-10-CM

## 2023-07-24 PROCEDURE — G8417 CALC BMI ABV UP PARAM F/U: HCPCS | Performed by: PHYSICIAN ASSISTANT

## 2023-07-24 PROCEDURE — G8427 DOCREV CUR MEDS BY ELIG CLIN: HCPCS | Performed by: PHYSICIAN ASSISTANT

## 2023-07-24 PROCEDURE — 3017F COLORECTAL CA SCREEN DOC REV: CPT | Performed by: PHYSICIAN ASSISTANT

## 2023-07-24 PROCEDURE — 99214 OFFICE O/P EST MOD 30 MIN: CPT | Performed by: PHYSICIAN ASSISTANT

## 2023-07-24 PROCEDURE — 4004F PT TOBACCO SCREEN RCVD TLK: CPT | Performed by: PHYSICIAN ASSISTANT

## 2023-07-24 NOTE — PROGRESS NOTES
Ishan Matthewsdeepti understands our discussion today. Discussed at length conservative treatment of knee symptoms/arthritis, according to AAOS Clinical Practice Guidelines:  1)    Recommend oral or topical NSAIDs to reduce pain and swelling. 2)    Recommend acetaminophen to reduce pain. 3)    Oral narcotics, including tramadol, result in a significant increase of adverse events and are not effective at improving pain or function for treatment of osteoarthritis of the knee. 4)    Recommend maintaining weight in a healthy range to reduce stresses on the knee, particularly the patellofemoral compartment which sees up to 6x body weight. 5)    Home exercise program, focusing on strengthening, low impact aerobic exercises, and neuromuscular education. 6)    Intra-articular corticosteroid injections are an option. Informed patient corticosteroid injections can be performed every 3-4 months as needed. 7)    Evidence for intra-articular hyaluronic acid injections (viscosupplementation) is not as strong, but may benefit a subset of patients who have failed other options. Informed patient           viscosupplementation can be performed every 6 months as needed. 8)    Bracing and use of a cane can improve pain and function. 9)    Activity modification. 10)  Maintaining Ideal body weight. Although not specifically listed in the CPGs, ice therapy and topical patches such as Salonpas are good options as well. Other non-surgical options including Coolief (cooled frequency ablation of the geniculate nerves), stem cell injections, PRP injections were discussed. Surgical option, knee arthroplasty discussed. Plan:  Medications-   NSAIDS discussed. The most common side effects from NSAIDs are stomachaches, heartburn, and nausea. NSAIDs may irritate the stomach lining. If the medicine upsets your stomach, you can try taking it with food.  But if that doesn't help, talk with your doctor to make sure

## 2023-08-14 ENCOUNTER — OFFICE VISIT (OUTPATIENT)
Dept: ORTHOPEDIC SURGERY | Age: 58
End: 2023-08-14
Payer: MEDICARE

## 2023-08-14 VITALS — WEIGHT: 274 LBS | BODY MASS INDEX: 43 KG/M2 | RESPIRATION RATE: 18 BRPM | HEIGHT: 67 IN

## 2023-08-14 DIAGNOSIS — M17.11 ARTHRITIS OF KNEE, RIGHT: Primary | ICD-10-CM

## 2023-08-14 PROCEDURE — 4004F PT TOBACCO SCREEN RCVD TLK: CPT | Performed by: PHYSICIAN ASSISTANT

## 2023-08-14 PROCEDURE — 3017F COLORECTAL CA SCREEN DOC REV: CPT | Performed by: PHYSICIAN ASSISTANT

## 2023-08-14 PROCEDURE — G8427 DOCREV CUR MEDS BY ELIG CLIN: HCPCS | Performed by: PHYSICIAN ASSISTANT

## 2023-08-14 PROCEDURE — 99212 OFFICE O/P EST SF 10 MIN: CPT | Performed by: PHYSICIAN ASSISTANT

## 2023-08-14 PROCEDURE — G8417 CALC BMI ABV UP PARAM F/U: HCPCS | Performed by: PHYSICIAN ASSISTANT

## 2023-08-14 NOTE — PROGRESS NOTES
Subjective:      Patient ID: Renita Davis is a 59 y.o.  female. Chief Complaint   Patient presents with    Knee Pain     Right         HPI: She is here for follow up on her right knee. She states symptoms have improved 90% with the recent cortisone injection performed on 7/24/23. Pain is on average 0-1/10. Pain is worse with increased activity/ weight bearing. Pain improves with rest/ elevation. Review of Systems:   Negative for fever or chills. Negative for significant numbness or tingling in lower extremity.       Past Medical History:   Diagnosis Date    Anemia     Anesthesia complication     severe headache after woke up after wisdom teeth    Anxiety and depression     Chronic back pain     Condyloma acuminatum     Headache(784.0)     Hyperlipemia, mixed     Hypertension     Hypertension, essential     Hypothyroidism     Hypothyroidism     IBS (irritable bowel syndrome)     Metrorrhagia     Morbid obesity with BMI of 40.0-44.9, adult (HCC)     Neuropathy     Obstructive sleep apnea, adult     cpap    Osteoarthritis     Pre-operative clearance     Urgency of urination     Wears glasses     driving at night/reading       Family History   Problem Relation Age of Onset    Heart Disease Mother         cardiac arrhythmia    High Blood Pressure Mother     Thyroid Disease Mother     Hypertension Mother     Cancer Mother     Other Mother         blood clots    Migraines Mother     Arthritis Mother     Arthritis Father     Heart Disease Father         cabg    Diabetes Father     Stroke Father     Hypertension Father     Other Father         blood clots    Cancer Sister         breast cancer    Cancer Brother         lung    Diabetes Other     High Blood Pressure Other     Stroke Other     Cancer Other         Bone    Alcohol Abuse Other        Past Surgical History:   Procedure Laterality Date    CATARACT REMOVAL      HIP SURGERY  2016, 2020    LEEP      NERVE BLOCK Right 02/18/2022    RIGHT SCIATIC NERVE

## 2023-08-28 ENCOUNTER — TELEPHONE (OUTPATIENT)
Dept: FAMILY MEDICINE CLINIC | Age: 58
End: 2023-08-28

## 2023-08-28 NOTE — TELEPHONE ENCOUNTER
Pt called stating her ozempic .5 only lasted four weeks instead of six. She stated she hasn't lost any weight either, so she'd like to go to \"the next level of it\".

## 2023-09-20 ENCOUNTER — OFFICE VISIT (OUTPATIENT)
Dept: FAMILY MEDICINE CLINIC | Age: 58
End: 2023-09-20

## 2023-09-20 VITALS
HEIGHT: 67 IN | WEIGHT: 261.2 LBS | SYSTOLIC BLOOD PRESSURE: 110 MMHG | BODY MASS INDEX: 41 KG/M2 | DIASTOLIC BLOOD PRESSURE: 68 MMHG

## 2023-09-20 DIAGNOSIS — E78.2 HYPERLIPEMIA, MIXED: ICD-10-CM

## 2023-09-20 DIAGNOSIS — E11.9 TYPE 2 DIABETES MELLITUS WITHOUT COMPLICATION, WITHOUT LONG-TERM CURRENT USE OF INSULIN (HCC): Primary | ICD-10-CM

## 2023-09-20 ASSESSMENT — ENCOUNTER SYMPTOMS
RESPIRATORY NEGATIVE: 1
GASTROINTESTINAL NEGATIVE: 1

## 2023-09-20 NOTE — PROGRESS NOTES
Subjective:      Patient ID: Bipin Villanueva is a 59 y.o. female. Diabetes  She presents for her follow-up diabetic visit. She has type 2 diabetes mellitus. Her disease course has been stable. There are no diabetic associated symptoms. Symptoms are stable. Risk factors for coronary artery disease include diabetes mellitus, dyslipidemia and hypertension. Current diabetic treatments: ozempic   insurance will no longer cover so she stopped about one month ago. She is following a diabetic diet. Meal planning includes avoidance of concentrated sweets. Her home blood glucose trend is decreasing steadily. Was doing great on the ozempic  Down 13 pounds   A1c down    Review of Systems   Constitutional: Negative. Respiratory: Negative. Cardiovascular: Negative. Gastrointestinal: Negative. Skin: Negative. Neurological: Negative. YOB: 1965    Date of Visit:  9/20/2023    Allergies   Allergen Reactions    Glucosamine Chondroitin Complx [Glucosamine-Chondroitin] Swelling     \"Due to sulfa allergy\" per pt    Sulfa Antibiotics Itching, Swelling and Other (See Comments)     Pt states gets really hot like she is on fire,and swelling of face,hands, and feet    Erythromycin Swelling       Outpatient Medications Marked as Taking for the 9/20/23 encounter (Office Visit) with Jack Reeves, DO   Medication Sig Dispense Refill    NONFORMULARY Nervive      simvastatin (ZOCOR) 40 MG tablet Take 1 tablet by mouth daily 90 tablet 1    hydroCHLOROthiazide (HYDRODIURIL) 25 MG tablet Take 1 tablet by mouth every morning 90 tablet 2    levothyroxine (SYNTHROID) 300 MCG tablet Take 1 tablet by mouth daily 90 tablet 1    gabapentin (NEURONTIN) 100 MG capsule Take 1 capsule by mouth 2 times daily for 180 days.  Intended supply: 30 days 90 capsule 5    diclofenac sodium (VOLTAREN) 1 % GEL Apply 4 grams to lower extremity joints and 2 grams to upper extremity joints as needed 4 times/day 500 g 5    vitamin D

## 2023-09-25 ENCOUNTER — TELEPHONE (OUTPATIENT)
Dept: FAMILY MEDICINE CLINIC | Age: 58
End: 2023-09-25

## 2023-09-25 RX ORDER — METFORMIN HYDROCHLORIDE 500 MG/1
500 TABLET, EXTENDED RELEASE ORAL
Qty: 90 TABLET | Refills: 1 | Status: SHIPPED | OUTPATIENT
Start: 2023-09-25

## 2023-09-25 NOTE — TELEPHONE ENCOUNTER
Pt called to let Dr Cristal Alves know that the Rybellus  is $700. She can not afford the Rybellus. Her insurance will pay for Metformin and Glipizide. Please give pt a call [phone]

## 2023-10-31 ENCOUNTER — TELEPHONE (OUTPATIENT)
Dept: ORTHOPEDIC SURGERY | Age: 58
End: 2023-10-31

## 2023-10-31 NOTE — TELEPHONE ENCOUNTER
Other PATIENT REQUEST RX FOR HANDICAP PLACARD RENEWAL. LAST RX WAS WITH DR Everette Man.  CURRENT RX EXPIRES TODAY

## 2023-12-06 RX ORDER — SIMVASTATIN 40 MG
40 TABLET ORAL DAILY
Qty: 90 TABLET | Refills: 3 | Status: SHIPPED | OUTPATIENT
Start: 2023-12-06

## 2023-12-06 RX ORDER — LEVOTHYROXINE SODIUM 300 UG/1
300 TABLET ORAL DAILY
Qty: 90 TABLET | Refills: 3 | Status: SHIPPED | OUTPATIENT
Start: 2023-12-06

## 2024-02-01 ENCOUNTER — OFFICE VISIT (OUTPATIENT)
Dept: FAMILY MEDICINE CLINIC | Age: 59
End: 2024-02-01

## 2024-02-01 VITALS
WEIGHT: 268.4 LBS | SYSTOLIC BLOOD PRESSURE: 152 MMHG | BODY MASS INDEX: 42.12 KG/M2 | DIASTOLIC BLOOD PRESSURE: 90 MMHG | HEIGHT: 67 IN

## 2024-02-01 DIAGNOSIS — E66.01 OBESITY, CLASS III, BMI 40-49.9 (MORBID OBESITY) (HCC): ICD-10-CM

## 2024-02-01 DIAGNOSIS — E11.40 TYPE 2 DIABETES MELLITUS WITH DIABETIC NEUROPATHY, WITHOUT LONG-TERM CURRENT USE OF INSULIN (HCC): ICD-10-CM

## 2024-02-01 DIAGNOSIS — E11.9 TYPE 2 DIABETES MELLITUS WITHOUT COMPLICATION, WITHOUT LONG-TERM CURRENT USE OF INSULIN (HCC): Primary | ICD-10-CM

## 2024-02-01 DIAGNOSIS — E78.2 HYPERLIPEMIA, MIXED: ICD-10-CM

## 2024-02-01 DIAGNOSIS — E03.9 HYPOTHYROIDISM, UNSPECIFIED TYPE: ICD-10-CM

## 2024-02-01 DIAGNOSIS — N18.30 STAGE 3 CHRONIC KIDNEY DISEASE, UNSPECIFIED WHETHER STAGE 3A OR 3B CKD (HCC): ICD-10-CM

## 2024-02-01 LAB
ALBUMIN SERPL-MCNC: 4.4 G/DL (ref 3.4–5)
ALBUMIN/GLOB SERPL: 1.8 {RATIO} (ref 1.1–2.2)
ALP SERPL-CCNC: 118 U/L (ref 40–129)
ALT SERPL-CCNC: 29 U/L (ref 10–40)
ANION GAP SERPL CALCULATED.3IONS-SCNC: 11 MMOL/L (ref 3–16)
AST SERPL-CCNC: 18 U/L (ref 15–37)
BILIRUB SERPL-MCNC: 0.3 MG/DL (ref 0–1)
BUN SERPL-MCNC: 19 MG/DL (ref 7–20)
CALCIUM SERPL-MCNC: 9.6 MG/DL (ref 8.3–10.6)
CHLORIDE SERPL-SCNC: 95 MMOL/L (ref 99–110)
CHOLEST SERPL-MCNC: 175 MG/DL (ref 0–199)
CO2 SERPL-SCNC: 32 MMOL/L (ref 21–32)
CREAT SERPL-MCNC: 0.8 MG/DL (ref 0.6–1.2)
GFR SERPLBLD CREATININE-BSD FMLA CKD-EPI: >60 ML/MIN/{1.73_M2}
GLUCOSE SERPL-MCNC: 147 MG/DL (ref 70–99)
HDLC SERPL-MCNC: 42 MG/DL (ref 40–60)
LDLC SERPL CALC-MCNC: ABNORMAL MG/DL
LDLC SERPL-MCNC: 95 MG/DL
POTASSIUM SERPL-SCNC: 4.2 MMOL/L (ref 3.5–5.1)
PROT SERPL-MCNC: 6.9 G/DL (ref 6.4–8.2)
SODIUM SERPL-SCNC: 138 MMOL/L (ref 136–145)
TRIGL SERPL-MCNC: 328 MG/DL (ref 0–150)
TSH SERPL DL<=0.005 MIU/L-ACNC: 0.54 UIU/ML (ref 0.27–4.2)
VLDLC SERPL CALC-MCNC: ABNORMAL MG/DL

## 2024-02-01 RX ORDER — HYDROCHLOROTHIAZIDE 25 MG/1
25 TABLET ORAL EVERY MORNING
Qty: 90 TABLET | Refills: 2 | Status: SHIPPED | OUTPATIENT
Start: 2024-02-01

## 2024-02-01 RX ORDER — GABAPENTIN 100 MG/1
100 CAPSULE ORAL 2 TIMES DAILY
Qty: 90 CAPSULE | Refills: 5 | Status: SHIPPED | OUTPATIENT
Start: 2024-02-01 | End: 2024-07-30

## 2024-02-01 RX ORDER — SEMAGLUTIDE 0.68 MG/ML
INJECTION, SOLUTION SUBCUTANEOUS
Qty: 3 ML | Refills: 0 | Status: SHIPPED | OUTPATIENT
Start: 2024-02-01

## 2024-02-01 RX ORDER — METFORMIN HYDROCHLORIDE 500 MG/1
500 TABLET, EXTENDED RELEASE ORAL
Qty: 90 TABLET | Refills: 1 | Status: CANCELLED | OUTPATIENT
Start: 2024-02-01

## 2024-02-01 ASSESSMENT — PATIENT HEALTH QUESTIONNAIRE - PHQ9
SUM OF ALL RESPONSES TO PHQ QUESTIONS 1-9: 0
SUM OF ALL RESPONSES TO PHQ QUESTIONS 1-9: 0
7. TROUBLE CONCENTRATING ON THINGS, SUCH AS READING THE NEWSPAPER OR WATCHING TELEVISION: 0
6. FEELING BAD ABOUT YOURSELF - OR THAT YOU ARE A FAILURE OR HAVE LET YOURSELF OR YOUR FAMILY DOWN: 0
10. IF YOU CHECKED OFF ANY PROBLEMS, HOW DIFFICULT HAVE THESE PROBLEMS MADE IT FOR YOU TO DO YOUR WORK, TAKE CARE OF THINGS AT HOME, OR GET ALONG WITH OTHER PEOPLE: 0
3. TROUBLE FALLING OR STAYING ASLEEP: 0
8. MOVING OR SPEAKING SO SLOWLY THAT OTHER PEOPLE COULD HAVE NOTICED. OR THE OPPOSITE, BEING SO FIGETY OR RESTLESS THAT YOU HAVE BEEN MOVING AROUND A LOT MORE THAN USUAL: 0
9. THOUGHTS THAT YOU WOULD BE BETTER OFF DEAD, OR OF HURTING YOURSELF: 0
SUM OF ALL RESPONSES TO PHQ QUESTIONS 1-9: 0
5. POOR APPETITE OR OVEREATING: 0
SUM OF ALL RESPONSES TO PHQ9 QUESTIONS 1 & 2: 0
4. FEELING TIRED OR HAVING LITTLE ENERGY: 0
1. LITTLE INTEREST OR PLEASURE IN DOING THINGS: 0
2. FEELING DOWN, DEPRESSED OR HOPELESS: 0
SUM OF ALL RESPONSES TO PHQ QUESTIONS 1-9: 0

## 2024-02-01 ASSESSMENT — ENCOUNTER SYMPTOMS
GASTROINTESTINAL NEGATIVE: 1
RESPIRATORY NEGATIVE: 1

## 2024-02-01 NOTE — PROGRESS NOTES
Subjective:      Patient ID: Shania Brumfield is a 58 y.o. female.    Diabetes  She presents for her follow-up diabetic visit. She has type 2 diabetes mellitus. Her disease course has been stable. There are no diabetic associated symptoms. Symptoms are stable. There are no diabetic complications. Risk factors for coronary artery disease include diabetes mellitus, dyslipidemia and family history. Current diabetic treatment includes oral agent (monotherapy) (she was doing really well on the ozempic and her insurance would not cover so went to metformin   sugar not as well controlled).       Review of Systems   Constitutional: Negative.    Respiratory: Negative.     Cardiovascular: Negative.    Gastrointestinal: Negative.    Skin: Negative.    Neurological: Negative.      YOB: 1965    Date of Visit:  2/1/2024    Allergies   Allergen Reactions    Glucosamine Chondroitin Complx [Glucosamine-Chondroitin] Swelling     \"Due to sulfa allergy\" per pt    Sulfa Antibiotics Itching, Swelling and Other (See Comments)     Pt states gets really hot like she is on fire,and swelling of face,hands, and feet    Erythromycin Swelling       Outpatient Medications Marked as Taking for the 2/1/24 encounter (Office Visit) with Paresh Stevens, DO   Medication Sig Dispense Refill    levothyroxine (SYNTHROID) 300 MCG tablet TAKE 1 TABLET DAILY 90 tablet 3    simvastatin (ZOCOR) 40 MG tablet TAKE 1 TABLET DAILY 90 tablet 3    metFORMIN (GLUCOPHAGE-XR) 500 MG extended release tablet Take 1 tablet by mouth daily (with breakfast) 90 tablet 1    NONFORMULARY Nervive      hydroCHLOROthiazide (HYDRODIURIL) 25 MG tablet Take 1 tablet by mouth every morning 90 tablet 2    diclofenac sodium (VOLTAREN) 1 % GEL Apply 4 grams to lower extremity joints and 2 grams to upper extremity joints as needed 4 times/day 500 g 5    vitamin D (CHOLECALCIFEROL) 1000 UNIT TABS tablet Take 1 tablet by mouth daily         Vitals:    02/01/24 1100   BP: (!)

## 2024-02-02 ENCOUNTER — TELEPHONE (OUTPATIENT)
Dept: ADMINISTRATIVE | Age: 59
End: 2024-02-02

## 2024-02-02 RX ORDER — ATORVASTATIN CALCIUM 40 MG/1
40 TABLET, FILM COATED ORAL DAILY
Qty: 90 TABLET | Refills: 1 | Status: SHIPPED | OUTPATIENT
Start: 2024-02-02

## 2024-02-02 NOTE — TELEPHONE ENCOUNTER
Submitted PA for Ozempic (0.25 or 0.5 MG/DOSE) 2MG/3ML pen-injectors  Via CMM Key: [id number] STATUS: PENDING.    Follow up done daily; if no decision with in three days we will refax.  If another three days goes by with no decision will call the insurance for status.

## 2024-03-12 ENCOUNTER — TELEPHONE (OUTPATIENT)
Dept: FAMILY MEDICINE CLINIC | Age: 59
End: 2024-03-12

## 2024-03-12 RX ORDER — SEMAGLUTIDE 1.34 MG/ML
INJECTION, SOLUTION SUBCUTANEOUS
Status: CANCELLED | OUTPATIENT
Start: 2024-03-12

## 2024-03-12 NOTE — TELEPHONE ENCOUNTER
Pt called stating she needs to be moved up to the next ozempic prescription. She's taken the .5 for two weeks and is out of the medication now. She'd like it sent to the [facility] on [address]

## 2024-05-15 ENCOUNTER — TELEPHONE (OUTPATIENT)
Dept: FAMILY MEDICINE CLINIC | Age: 59
End: 2024-05-15

## 2024-05-15 NOTE — TELEPHONE ENCOUNTER
LVM informing pt to call back in order to schedule a lab visit to have her A1C checked per Dr. Stevens and PHILLY Lux.

## 2024-05-31 ENCOUNTER — OFFICE VISIT (OUTPATIENT)
Dept: FAMILY MEDICINE CLINIC | Age: 59
End: 2024-05-31

## 2024-05-31 VITALS
HEIGHT: 67 IN | SYSTOLIC BLOOD PRESSURE: 122 MMHG | DIASTOLIC BLOOD PRESSURE: 86 MMHG | WEIGHT: 262 LBS | BODY MASS INDEX: 41.12 KG/M2

## 2024-05-31 DIAGNOSIS — E11.65 TYPE 2 DIABETES MELLITUS WITH HYPERGLYCEMIA, WITHOUT LONG-TERM CURRENT USE OF INSULIN (HCC): ICD-10-CM

## 2024-05-31 DIAGNOSIS — Z00.00 ENCOUNTER FOR ANNUAL WELLNESS VISIT (AWV) IN MEDICARE PATIENT: Primary | ICD-10-CM

## 2024-05-31 DIAGNOSIS — E11.9 TYPE 2 DIABETES MELLITUS WITHOUT COMPLICATION, WITHOUT LONG-TERM CURRENT USE OF INSULIN (HCC): ICD-10-CM

## 2024-05-31 DIAGNOSIS — E78.2 HYPERLIPEMIA, MIXED: ICD-10-CM

## 2024-05-31 DIAGNOSIS — Z00.00 MEDICARE ANNUAL WELLNESS VISIT, SUBSEQUENT: ICD-10-CM

## 2024-05-31 LAB
ALBUMIN SERPL-MCNC: 4.1 G/DL (ref 3.4–5)
ALBUMIN/GLOB SERPL: 1.6 {RATIO} (ref 1.1–2.2)
ALP SERPL-CCNC: 106 U/L (ref 40–129)
ALT SERPL-CCNC: 20 U/L (ref 10–40)
ANION GAP SERPL CALCULATED.3IONS-SCNC: 8 MMOL/L (ref 3–16)
AST SERPL-CCNC: 11 U/L (ref 15–37)
BILIRUB SERPL-MCNC: <0.2 MG/DL (ref 0–1)
BUN SERPL-MCNC: 16 MG/DL (ref 7–20)
CALCIUM SERPL-MCNC: 9.8 MG/DL (ref 8.3–10.6)
CHLORIDE SERPL-SCNC: 100 MMOL/L (ref 99–110)
CHOLEST SERPL-MCNC: 160 MG/DL (ref 0–199)
CO2 SERPL-SCNC: 31 MMOL/L (ref 21–32)
CREAT SERPL-MCNC: 0.7 MG/DL (ref 0.6–1.2)
GFR SERPLBLD CREATININE-BSD FMLA CKD-EPI: >90 ML/MIN/{1.73_M2}
GLUCOSE SERPL-MCNC: 138 MG/DL (ref 70–99)
HBA1C MFR BLD: 7.6 %
HDLC SERPL-MCNC: 39 MG/DL (ref 40–60)
LDLC SERPL CALC-MCNC: 66 MG/DL
POTASSIUM SERPL-SCNC: 4.4 MMOL/L (ref 3.5–5.1)
PROT SERPL-MCNC: 6.6 G/DL (ref 6.4–8.2)
SODIUM SERPL-SCNC: 139 MMOL/L (ref 136–145)
TRIGL SERPL-MCNC: 273 MG/DL (ref 0–150)
VLDLC SERPL CALC-MCNC: 55 MG/DL

## 2024-05-31 RX ORDER — GABAPENTIN 100 MG/1
CAPSULE ORAL
Qty: 120 CAPSULE | Refills: 5 | Status: SHIPPED | OUTPATIENT
Start: 2024-05-31 | End: 2024-06-30

## 2024-05-31 SDOH — ECONOMIC STABILITY: FOOD INSECURITY: WITHIN THE PAST 12 MONTHS, THE FOOD YOU BOUGHT JUST DIDN'T LAST AND YOU DIDN'T HAVE MONEY TO GET MORE.: NEVER TRUE

## 2024-05-31 SDOH — ECONOMIC STABILITY: FOOD INSECURITY: WITHIN THE PAST 12 MONTHS, YOU WORRIED THAT YOUR FOOD WOULD RUN OUT BEFORE YOU GOT MONEY TO BUY MORE.: NEVER TRUE

## 2024-05-31 SDOH — ECONOMIC STABILITY: INCOME INSECURITY: HOW HARD IS IT FOR YOU TO PAY FOR THE VERY BASICS LIKE FOOD, HOUSING, MEDICAL CARE, AND HEATING?: NOT HARD AT ALL

## 2024-05-31 ASSESSMENT — PATIENT HEALTH QUESTIONNAIRE - PHQ9
4. FEELING TIRED OR HAVING LITTLE ENERGY: NOT AT ALL
2. FEELING DOWN, DEPRESSED OR HOPELESS: NOT AT ALL
8. MOVING OR SPEAKING SO SLOWLY THAT OTHER PEOPLE COULD HAVE NOTICED. OR THE OPPOSITE, BEING SO FIGETY OR RESTLESS THAT YOU HAVE BEEN MOVING AROUND A LOT MORE THAN USUAL: NOT AT ALL
SUM OF ALL RESPONSES TO PHQ QUESTIONS 1-9: 0
10. IF YOU CHECKED OFF ANY PROBLEMS, HOW DIFFICULT HAVE THESE PROBLEMS MADE IT FOR YOU TO DO YOUR WORK, TAKE CARE OF THINGS AT HOME, OR GET ALONG WITH OTHER PEOPLE: NOT DIFFICULT AT ALL
SUM OF ALL RESPONSES TO PHQ9 QUESTIONS 1 & 2: 0
3. TROUBLE FALLING OR STAYING ASLEEP: NOT AT ALL
5. POOR APPETITE OR OVEREATING: NOT AT ALL
SUM OF ALL RESPONSES TO PHQ QUESTIONS 1-9: 0
7. TROUBLE CONCENTRATING ON THINGS, SUCH AS READING THE NEWSPAPER OR WATCHING TELEVISION: NOT AT ALL
9. THOUGHTS THAT YOU WOULD BE BETTER OFF DEAD, OR OF HURTING YOURSELF: NOT AT ALL
SUM OF ALL RESPONSES TO PHQ QUESTIONS 1-9: 0
6. FEELING BAD ABOUT YOURSELF - OR THAT YOU ARE A FAILURE OR HAVE LET YOURSELF OR YOUR FAMILY DOWN: NOT AT ALL
SUM OF ALL RESPONSES TO PHQ QUESTIONS 1-9: 0
1. LITTLE INTEREST OR PLEASURE IN DOING THINGS: NOT AT ALL

## 2024-05-31 ASSESSMENT — LIFESTYLE VARIABLES
HOW MANY STANDARD DRINKS CONTAINING ALCOHOL DO YOU HAVE ON A TYPICAL DAY: 3 OR 4
HOW OFTEN DO YOU HAVE A DRINK CONTAINING ALCOHOL: MONTHLY OR LESS

## 2024-05-31 NOTE — PROGRESS NOTES
DO Paresh   Semaglutide, 1 MG/DOSE, 4 MG/3ML SOPN Inject 1 mg into the skin once a week Yes Paresh Stevens DO   hydroCHLOROthiazide (HYDRODIURIL) 25 MG tablet Take 1 tablet by mouth every morning Yes Paresh Stevens DO   Semaglutide,0.25 or 0.5MG/DOS, (OZEMPIC, 0.25 OR 0.5 MG/DOSE,) 2 MG/3ML SOPN .25mg sq once week for four weeks then increase to .5mg sq weekly Yes Paresh Stevens DO   levothyroxine (SYNTHROID) 300 MCG tablet TAKE 1 TABLET DAILY Yes Paresh Stevens DO   simvastatin (ZOCOR) 40 MG tablet TAKE 1 TABLET DAILY Yes Paresh Stevens DO   metFORMIN (GLUCOPHAGE-XR) 500 MG extended release tablet Take 1 tablet by mouth daily (with breakfast) Yes Paresh Stevens DO   NONFORMULARY Nervive Yes ProviderLaurel MD   Semaglutide 3 MG TABS Take 1 tablet by mouth daily Yes Paresh Stevens DO   Semaglutide,0.25 or 0.5MG/DOS, (OZEMPIC, 0.25 OR 0.5 MG/DOSE,) 2 MG/1.5ML SOPN Inject 0.5 mg into the skin once a week Yes Paresh Stevens DO   hydroxychloroquine (PLAQUENIL) 200 MG tablet TAKE ONE TABLET BY MOUTH TWICE DAILY Yes Katelyn Cabrera MD   OZEMPIC, 0.25 OR 0.5 MG/DOSE, 2 MG/3ML SOPN INJECT 0.25MG UNDER THE SKIN WEEKLY FOR 4 WEEKS THEN INCREASE TO 0.5MG WEEKLY Yes Paresh Stevens DO   Semaglutide,0.25 or 0.5MG/DOS, (OZEMPIC, 0.25 OR 0.5 MG/DOSE,) 2 MG/1.5ML SOPN .25 mg sq weekly for four weeks then increase to .5 mg sq weekly Yes Paresh Stevens DO   diclofenac sodium (VOLTAREN) 1 % GEL Apply 4 grams to lower extremity joints and 2 grams to upper extremity joints as needed 4 times/day Yes Katelyn Cabrera MD   folic acid (FOLVITE) 400 MCG tablet Take 1 tablet by mouth daily Yes ProviderLaurel MD   vitamin D (CHOLECALCIFEROL) 1000 UNIT TABS tablet Take 1 tablet by mouth daily Yes Provider, MD Laurel   vitamin B-12 (CYANOCOBALAMIN) 1000 MCG tablet Take 1 tablet by mouth daily Yes Provider, MD Laurel   Biotin 74139 MCG TABS Take by mouth Yes Provider, MD Jaydon Barragan (Including

## 2024-06-01 LAB
EST. AVERAGE GLUCOSE BLD GHB EST-MCNC: 162.8 MG/DL
HBA1C MFR BLD: 7.3 %

## 2024-06-01 RX ORDER — SEMAGLUTIDE 1.34 MG/ML
INJECTION, SOLUTION SUBCUTANEOUS
Qty: 3 ML | Refills: 2 | Status: SHIPPED | OUTPATIENT
Start: 2024-06-01

## 2024-06-01 NOTE — PATIENT INSTRUCTIONS
Bit by bit, increase the time you're active every day. Try for at least 30 minutes on most days of the week.     Try to quit or cut back on using tobacco and other nicotine products. This includes smoking and vaping. If you need help quitting, talk to your doctor about stop-smoking programs and medicines. These can increase your chances of quitting for good. Quitting is one of the most important things you can do to protect your heart. It is never too late to quit. Try to avoid secondhand smoke too.     Stay at a weight that's healthy for you. Talk to your doctor if you need help losing weight.     Try to get 7 to 9 hours of sleep each night.     Limit alcohol to 2 drinks a day for men and 1 drink a day for women. Too much alcohol can cause health problems.     Manage other health problems such as diabetes, high blood pressure, and high cholesterol. If you think you may have a problem with alcohol or drug use, talk to your doctor.   Medicines    Take your medicines exactly as prescribed. Call your doctor if you think you are having a problem with your medicine.     If your doctor recommends aspirin, take the amount directed each day. Make sure you take aspirin and not another kind of pain reliever, such as acetaminophen (Tylenol).   When should you call for help?   Call 911 if you have symptoms of a heart attack. These may include:    Chest pain or pressure, or a strange feeling in the chest.     Sweating.     Shortness of breath.     Pain, pressure, or a strange feeling in the back, neck, jaw, or upper belly or in one or both shoulders or arms.     Lightheadedness or sudden weakness.     A fast or irregular heartbeat.   After you call 911, the  may tell you to chew 1 adult-strength or 2 to 4 low-dose aspirin. Wait for an ambulance. Do not try to drive yourself.  Watch closely for changes in your health, and be sure to contact your doctor if you have any problems.  Where can you learn more?  Go to

## 2024-07-19 RX ORDER — LEVOTHYROXINE SODIUM 300 UG/1
300 TABLET ORAL DAILY
Qty: 90 TABLET | Refills: 3 | Status: SHIPPED | OUTPATIENT
Start: 2024-07-19

## 2024-07-19 RX ORDER — SIMVASTATIN 40 MG
40 TABLET ORAL DAILY
Qty: 90 TABLET | Refills: 3 | Status: SHIPPED | OUTPATIENT
Start: 2024-07-19

## 2024-07-19 NOTE — TELEPHONE ENCOUNTER
Last office visit 5/31/2024     Next office visit scheduled Visit date not found    Requested Prescriptions     Pending Prescriptions Disp Refills    simvastatin (ZOCOR) 40 MG tablet 90 tablet 3     Sig: Take 1 tablet by mouth daily    levothyroxine (SYNTHROID) 300 MCG tablet 90 tablet 3     Sig: Take 1 tablet by mouth daily

## 2024-09-30 ENCOUNTER — OFFICE VISIT (OUTPATIENT)
Dept: FAMILY MEDICINE CLINIC | Age: 59
End: 2024-09-30
Payer: MEDICARE

## 2024-09-30 VITALS
SYSTOLIC BLOOD PRESSURE: 122 MMHG | BODY MASS INDEX: 41.25 KG/M2 | HEIGHT: 67 IN | WEIGHT: 262.8 LBS | DIASTOLIC BLOOD PRESSURE: 78 MMHG

## 2024-09-30 DIAGNOSIS — G47.33 OBSTRUCTIVE SLEEP APNEA SYNDROME: ICD-10-CM

## 2024-09-30 DIAGNOSIS — E11.9 TYPE 2 DIABETES MELLITUS WITHOUT COMPLICATION, WITHOUT LONG-TERM CURRENT USE OF INSULIN (HCC): Primary | ICD-10-CM

## 2024-09-30 LAB — HBA1C MFR BLD: 8 %

## 2024-09-30 PROCEDURE — 3052F HG A1C>EQUAL 8.0%<EQUAL 9.0%: CPT | Performed by: FAMILY MEDICINE

## 2024-09-30 PROCEDURE — 83036 HEMOGLOBIN GLYCOSYLATED A1C: CPT | Performed by: FAMILY MEDICINE

## 2024-09-30 PROCEDURE — 3078F DIAST BP <80 MM HG: CPT | Performed by: FAMILY MEDICINE

## 2024-09-30 PROCEDURE — 99214 OFFICE O/P EST MOD 30 MIN: CPT | Performed by: FAMILY MEDICINE

## 2024-09-30 PROCEDURE — 3074F SYST BP LT 130 MM HG: CPT | Performed by: FAMILY MEDICINE

## 2024-09-30 ASSESSMENT — ENCOUNTER SYMPTOMS
GASTROINTESTINAL NEGATIVE: 1
RESPIRATORY NEGATIVE: 1

## 2024-09-30 NOTE — PROGRESS NOTES
YOB: 1965    Date of Visit:  2024    Allergies   Allergen Reactions    Glucosamine Chondroitin Complx [Glucosamine-Chondroitin] Swelling     \"Due to sulfa allergy\" per pt    Sulfa Antibiotics Itching, Swelling and Other (See Comments)     Pt states gets really hot like she is on fire,and swelling of face,hands, and feet    Erythromycin Swelling       Outpatient Medications Marked as Taking for the 24 encounter (Office Visit) with Paresh Stevens,    Medication Sig Dispense Refill    simvastatin (ZOCOR) 40 MG tablet Take 1 tablet by mouth daily 90 tablet 3    levothyroxine (SYNTHROID) 300 MCG tablet Take 1 tablet by mouth daily 90 tablet 3    hydroCHLOROthiazide (HYDRODIURIL) 25 MG tablet Take 1 tablet by mouth every morning 90 tablet 2    NONFORMULARY Nervive      vitamin D (CHOLECALCIFEROL) 1000 UNIT TABS tablet Take 1 tablet by mouth daily      Biotin 86692 MCG TABS Take by mouth         Vitals:    24 0946   BP: 122/78   Weight: 119.2 kg (262 lb 12.8 oz)   Height: 1.702 m (5' 7\")     Body mass index is 41.16 kg/m².     Wt Readings from Last 3 Encounters:   24 119.2 kg (262 lb 12.8 oz)   24 118.8 kg (262 lb)   24 121.7 kg (268 lb 6.4 oz)     BP Readings from Last 3 Encounters:   24 122/78   24 122/86   24 (!) 152/90         Shania Brumfield (:  1965) is a 58 y.o. female,Established patient, here for evaluation of the following chief complaint(s):  Discuss Medications         Assessment & Plan  Type 2 diabetes mellitus without complication, without long-term current use of insulin (HCC)   Will switch back to ozempic in  when her insurance changes  Discussed compounding at [facility]     Orders:    POCT glycosylated hemoglobin (Hb A1C)    SITagliptin (JANUVIA) 50 MG tablet; Take 1 tablet by mouth daily    Obstructive sleep apnea syndrome    Refer to be considered for [facility] since she does not tolerate CPAP    Orders:    [facility],

## 2024-09-30 NOTE — ASSESSMENT & PLAN NOTE
Will switch back to ozempic in Jan when her insurance changes  Discussed compounding at [facility]     Orders:    POCT glycosylated hemoglobin (Hb A1C)    SITagliptin (JANUVIA) 50 MG tablet; Take 1 tablet by mouth daily

## 2024-10-03 RX ORDER — HYDROCHLOROTHIAZIDE 25 MG/1
25 TABLET ORAL EVERY MORNING
Qty: 90 TABLET | Refills: 2 | Status: SHIPPED | OUTPATIENT
Start: 2024-10-03

## 2024-11-11 ENCOUNTER — TELEPHONE (OUTPATIENT)
Dept: FAMILY MEDICINE CLINIC | Age: 59
End: 2024-11-11

## 2024-11-11 NOTE — TELEPHONE ENCOUNTER
Pt called stating she has a gum infection, requesting an antibiotic    Advised pt to submit an EVisit to request antibiotic    Pt VU stating she would.

## 2024-11-12 RX ORDER — CLINDAMYCIN HYDROCHLORIDE 300 MG/1
300 CAPSULE ORAL 3 TIMES DAILY
Qty: 30 CAPSULE | Refills: 0 | Status: SHIPPED | OUTPATIENT
Start: 2024-11-12 | End: 2024-11-22

## 2024-11-12 NOTE — TELEPHONE ENCOUNTER
Patient called stating she tried to submit an e-visit yesterday and was prompted to contact the office. Tired to walk patient through submitting a new e-visit without success    Patient states right side gum swelling just below her check bone, denies toothache or pain. Patient states facial swelling. Patient states green discharge from the gum and \"nasty\" taste in her mouth. Patient states symptoms started Thursday. Patient states she does not have a dentist due to not having dental insurance

## 2024-12-05 RX ORDER — GABAPENTIN 100 MG/1
CAPSULE ORAL
Qty: 120 CAPSULE | Refills: 5 | Status: SHIPPED | OUTPATIENT
Start: 2024-12-05 | End: 2025-01-04

## 2025-01-22 ENCOUNTER — TELEPHONE (OUTPATIENT)
Dept: ADMINISTRATIVE | Age: 60
End: 2025-01-22

## 2025-01-22 NOTE — TELEPHONE ENCOUNTER
Submitted PA for Ozempic (0.25 or 0.5 MG/DOSE) 2MG/3ML pen-injectors  Via CMM Key: [id number] STATUS: not sent     Request received thru CMM, no active script in epic. If this is needed please place a new scrip.     Thanks     If this requires a response please respond to the pool ( P MHCX PSC MEDICATION PRE-AUTH).      Thank you please advise patient.

## 2025-01-27 NOTE — TELEPHONE ENCOUNTER
Any update on scrip for Ozempic ?    Thanks       If this requires a response please respond to the pool ( P MHCX PSC MEDICATION PRE-AUTH).      Thank you please advise patient.

## 2025-02-14 NOTE — PROGRESS NOTES
Patient A&O in the bed. Patient tolerating PO intake well. PM medications given without complications. Patient denies nausea or vomiting. Patient complains of pain, PRN medication given - see MAR. Call light within reach, able to make needs known, fall precautions in place. Will check on patient Q2 hours.     Electronically signed by Della Mehta RN on 2/10/2021 at 11:38 PM Patient was discharged home via w/c.   Discharge instructions were reviewed with patient and son. And they verbalized understanding.   Prescriptions: none

## 2025-02-26 ENCOUNTER — OFFICE VISIT (OUTPATIENT)
Dept: FAMILY MEDICINE CLINIC | Age: 60
End: 2025-02-26

## 2025-02-26 VITALS
DIASTOLIC BLOOD PRESSURE: 72 MMHG | BODY MASS INDEX: 41.28 KG/M2 | WEIGHT: 263 LBS | HEIGHT: 67 IN | SYSTOLIC BLOOD PRESSURE: 134 MMHG

## 2025-02-26 DIAGNOSIS — E11.9 TYPE 2 DIABETES MELLITUS WITHOUT COMPLICATION, WITHOUT LONG-TERM CURRENT USE OF INSULIN (HCC): Primary | ICD-10-CM

## 2025-02-26 DIAGNOSIS — E03.9 HYPOTHYROIDISM, UNSPECIFIED TYPE: ICD-10-CM

## 2025-02-26 DIAGNOSIS — E66.813 OBESITY, CLASS 3: ICD-10-CM

## 2025-02-26 DIAGNOSIS — Z00.00 ENCOUNTER FOR ANNUAL WELLNESS VISIT (AWV) IN MEDICARE PATIENT: ICD-10-CM

## 2025-02-26 DIAGNOSIS — Z00.00 MEDICARE ANNUAL WELLNESS VISIT, SUBSEQUENT: ICD-10-CM

## 2025-02-26 DIAGNOSIS — E78.2 HYPERLIPEMIA, MIXED: ICD-10-CM

## 2025-02-26 DIAGNOSIS — Z12.31 ENCOUNTER FOR SCREENING MAMMOGRAM FOR MALIGNANT NEOPLASM OF BREAST: ICD-10-CM

## 2025-02-26 DIAGNOSIS — M15.0 PRIMARY OSTEOARTHRITIS INVOLVING MULTIPLE JOINTS: ICD-10-CM

## 2025-02-26 LAB
CREAT UR-MCNC: 75.6 MG/DL (ref 28–259)
HBA1C MFR BLD: 10.9 %
MICROALBUMIN UR DL<=1MG/L-MCNC: 3.11 MG/DL
MICROALBUMIN/CREAT UR: 41.1 MG/G (ref 0–30)

## 2025-02-26 RX ORDER — LANOLIN ALCOHOL/MO/W.PET/CERES
400 CREAM (GRAM) TOPICAL DAILY
COMMUNITY

## 2025-02-26 RX ORDER — GABAPENTIN 100 MG/1
CAPSULE ORAL
Qty: 120 CAPSULE | Refills: 5 | Status: SHIPPED | OUTPATIENT
Start: 2025-02-26 | End: 2025-03-28

## 2025-02-26 RX ORDER — SIMVASTATIN 40 MG
40 TABLET ORAL DAILY
Qty: 90 TABLET | Refills: 3 | Status: SHIPPED | OUTPATIENT
Start: 2025-02-26

## 2025-02-26 RX ORDER — LEVOTHYROXINE SODIUM 300 UG/1
300 TABLET ORAL DAILY
Qty: 90 TABLET | Refills: 3 | Status: SHIPPED | OUTPATIENT
Start: 2025-02-26

## 2025-02-26 RX ORDER — HYDROCHLOROTHIAZIDE 25 MG/1
25 TABLET ORAL EVERY MORNING
Qty: 90 TABLET | Refills: 2 | Status: SHIPPED | OUTPATIENT
Start: 2025-02-26

## 2025-02-26 SDOH — ECONOMIC STABILITY: FOOD INSECURITY: WITHIN THE PAST 12 MONTHS, THE FOOD YOU BOUGHT JUST DIDN'T LAST AND YOU DIDN'T HAVE MONEY TO GET MORE.: NEVER TRUE

## 2025-02-26 SDOH — ECONOMIC STABILITY: FOOD INSECURITY: WITHIN THE PAST 12 MONTHS, YOU WORRIED THAT YOUR FOOD WOULD RUN OUT BEFORE YOU GOT MONEY TO BUY MORE.: NEVER TRUE

## 2025-02-26 ASSESSMENT — PATIENT HEALTH QUESTIONNAIRE - PHQ9
SUM OF ALL RESPONSES TO PHQ QUESTIONS 1-9: 0
4. FEELING TIRED OR HAVING LITTLE ENERGY: NOT AT ALL
6. FEELING BAD ABOUT YOURSELF - OR THAT YOU ARE A FAILURE OR HAVE LET YOURSELF OR YOUR FAMILY DOWN: NOT AT ALL
SUM OF ALL RESPONSES TO PHQ QUESTIONS 1-9: 0
SUM OF ALL RESPONSES TO PHQ9 QUESTIONS 1 & 2: 0
SUM OF ALL RESPONSES TO PHQ QUESTIONS 1-9: 0
2. FEELING DOWN, DEPRESSED OR HOPELESS: NOT AT ALL
3. TROUBLE FALLING OR STAYING ASLEEP: NOT AT ALL
1. LITTLE INTEREST OR PLEASURE IN DOING THINGS: NOT AT ALL
5. POOR APPETITE OR OVEREATING: NOT AT ALL
8. MOVING OR SPEAKING SO SLOWLY THAT OTHER PEOPLE COULD HAVE NOTICED. OR THE OPPOSITE, BEING SO FIGETY OR RESTLESS THAT YOU HAVE BEEN MOVING AROUND A LOT MORE THAN USUAL: NOT AT ALL
10. IF YOU CHECKED OFF ANY PROBLEMS, HOW DIFFICULT HAVE THESE PROBLEMS MADE IT FOR YOU TO DO YOUR WORK, TAKE CARE OF THINGS AT HOME, OR GET ALONG WITH OTHER PEOPLE: NOT DIFFICULT AT ALL
7. TROUBLE CONCENTRATING ON THINGS, SUCH AS READING THE NEWSPAPER OR WATCHING TELEVISION: NOT AT ALL
9. THOUGHTS THAT YOU WOULD BE BETTER OFF DEAD, OR OF HURTING YOURSELF: NOT AT ALL
SUM OF ALL RESPONSES TO PHQ QUESTIONS 1-9: 0

## 2025-02-26 ASSESSMENT — LIFESTYLE VARIABLES
HOW OFTEN DO YOU HAVE A DRINK CONTAINING ALCOHOL: NEVER
HOW MANY STANDARD DRINKS CONTAINING ALCOHOL DO YOU HAVE ON A TYPICAL DAY: 1 OR 2

## 2025-02-26 NOTE — PROGRESS NOTES
Medicare Annual Wellness Visit  Name: Shania Brumfield Today’s Date: 2025   MRN: [id number] Sex: Female   Age: 59 y.o. Ethnicity: Non- / Non    : 1965 Race: White (non-)      Shania Brumfield is here for Medicare AWV and Arthritis (Hands mainly/)  Assessment/plan;  Shania was seen today for medicare awv and arthritis.    Diagnoses and all orders for this visit:    Type 2 diabetes mellitus without complication, without long-term current use of insulin (HCC)  -     Albumin/Creatinine Ratio, Urine  -     POCT glycosylated hemoglobin (Hb A1C)  -     Semaglutide,0.25 or 0.5MG/DOS, 2 MG/3ML SOPN; Inject 0.25 mg into the skin every 7 days    Body mass index [BMI] 40.0-44.9, adult (Z68.41)    Obesity, class 3 (E66.813)    Encounter for annual wellness visit (AWV) in Medicare patient    Hyperlipemia, mixed  -     simvastatin (ZOCOR) 40 MG tablet; Take 1 tablet by mouth daily    Medicare annual wellness visit, subsequent    Hypothyroidism, unspecified type  -     levothyroxine (SYNTHROID) 300 MCG tablet; Take 1 tablet by mouth daily    Primary osteoarthritis involving multiple joints  -     gabapentin (NEURONTIN) 100 MG capsule; TAKE 1 CAPSULE BY MOUTH EVERY MORNING AND 3 CAPSULES EVERY NIGHT AT BEDTIME    Encounter for screening mammogram for malignant neoplasm of breast  -     AMY DIGITAL SCREEN W OR WO CAD BILATERAL; Future    Other orders  -     hydroCHLOROthiazide (HYDRODIURIL) 25 MG tablet; Take 1 tablet by mouth every morning      No follow-ups on file.    Screenings for behavioral, psychosocial and functional/safety risks, and cognitive dysfunction are all negative except as indicated below. These results, as well as other patient data from the Health Risk Assessment form, are documented in Flowsheets linked to this Encounter.    Allergies   Allergen Reactions    Glucosamine Chondroitin Complx [Glucosamine-Chondroitin] Swelling     \"Due to sulfa allergy\" per pt    Sulfa Antibiotics

## 2025-04-10 ENCOUNTER — PATIENT MESSAGE (OUTPATIENT)
Dept: FAMILY MEDICINE CLINIC | Age: 60
End: 2025-04-10

## 2025-04-10 DIAGNOSIS — E11.9 TYPE 2 DIABETES MELLITUS WITHOUT COMPLICATION, WITHOUT LONG-TERM CURRENT USE OF INSULIN: ICD-10-CM

## 2025-04-11 NOTE — TELEPHONE ENCOUNTER
Last office visit 2/26/2025       Next office visit scheduled Visit date not found    Requested Prescriptions     Pending Prescriptions Disp Refills    OZEMPIC, 0.25 OR 0.5 MG/DOSE, 2 MG/3ML SOPN [Pharmacy Med Name: OZEMPIC 0.25MG-0.5MG/DOSE 3ML PEN] 3 mL 0     Sig: INJECT 0.25 MG UNDER THE SKIN ONCE WEEKLY (PEN EXPIRES AFTER 56 DAYS)

## 2025-04-11 NOTE — TELEPHONE ENCOUNTER
Patient called stating she is locked out of her My Chart account and is on the road so she can't reset at this time.  She would like to increase dosage and current weight is 278 lbs    [facility]    Last office visit 2/26/2025   Next office visit scheduled Visit date not found

## 2025-04-15 RX ORDER — SEMAGLUTIDE 0.68 MG/ML
INJECTION, SOLUTION SUBCUTANEOUS
Qty: 3 ML | Refills: 0 | Status: SHIPPED | OUTPATIENT
Start: 2025-04-15

## 2025-04-28 NOTE — TELEPHONE ENCOUNTER
Patient called stating [facility] is stating they have not receive her prescription for her Ozempic 0.5mg that was sent on 4/15/25. Patient requesting a new prescription be sent to [facility] on [facility] [place]    Last office visit: 2/26/2025    Next office visit scheduled: Visit date not found

## 2025-05-01 ENCOUNTER — PATIENT MESSAGE (OUTPATIENT)
Dept: FAMILY MEDICINE CLINIC | Age: 60
End: 2025-05-01

## 2025-05-01 DIAGNOSIS — E11.9 TYPE 2 DIABETES MELLITUS WITHOUT COMPLICATION, WITHOUT LONG-TERM CURRENT USE OF INSULIN (HCC): ICD-10-CM

## 2025-05-01 NOTE — TELEPHONE ENCOUNTER
It looks like [facility] was [facility] in 4/28 [facility]  does she want us to cancel there and [facility]?

## 2025-05-02 RX ORDER — SEMAGLUTIDE 0.68 MG/ML
INJECTION, SOLUTION SUBCUTANEOUS
Qty: 3 ML | Refills: 0 | OUTPATIENT
Start: 2025-05-02

## 2025-05-05 ENCOUNTER — TELEPHONE (OUTPATIENT)
Dept: ADMINISTRATIVE | Age: 60
End: 2025-05-05

## 2025-05-05 RX ORDER — SEMAGLUTIDE 0.68 MG/ML
0.5 INJECTION, SOLUTION SUBCUTANEOUS WEEKLY
Qty: 3 ML | Refills: 1 | Status: SHIPPED | OUTPATIENT
Start: 2025-05-05

## 2025-05-05 NOTE — TELEPHONE ENCOUNTER
Submitted PA for Wegovy 1MG/0.5ML auto-injectors  Via [facility] [id number] STATUS: PENDING.    Follow up done daily; if no decision with in three days we will refax.  If another three days goes by with no decision will call the insurance for status.

## 2025-05-06 NOTE — TELEPHONE ENCOUNTER
The medication was DENIED; DENIAL letter is uploaded to MEDIA.    Generic Denial: Drug is not covered by the plan ( Plan Exclusion)       Note :    We denied coverage for this drug because Medicare law does not include drugs for weight loss under Medicare Part D coverage. This is one of those drugs. This is not a medical necessity decision. It is a benefit issue only.    If you want an APPEAL; please note in this encounter what new information you would like to APPEAL with.  Once complete route back to PA POOL.    If this requires a response please respond to the pool ( P MHCX PSC MEDICATION PRE-AUTH).      Thank you please advise patient.

## 2025-06-09 DIAGNOSIS — E11.9 TYPE 2 DIABETES MELLITUS WITHOUT COMPLICATION, WITHOUT LONG-TERM CURRENT USE OF INSULIN (HCC): ICD-10-CM

## 2025-06-09 RX ORDER — SEMAGLUTIDE 0.68 MG/ML
INJECTION, SOLUTION SUBCUTANEOUS
Qty: 3 ML | Refills: 1 | OUTPATIENT
Start: 2025-06-09

## 2025-06-16 ENCOUNTER — TELEPHONE (OUTPATIENT)
Dept: FAMILY MEDICINE CLINIC | Age: 60
End: 2025-06-16

## 2025-06-16 NOTE — TELEPHONE ENCOUNTER
Patient called she has swelling on right foot, and also has it bad on her left foot and ankle.  She would like to talk to her about Neuropathy , needs a AC1 recheck.  I scheduled her on 7/3 at 10:45, but only for a 15 minute, that is all that was available.  Looks like she is usually a 30 minutes visit.  Sjhe ask if she could get in sooner.

## 2025-06-20 ENCOUNTER — OFFICE VISIT (OUTPATIENT)
Dept: FAMILY MEDICINE CLINIC | Age: 60
End: 2025-06-20
Payer: MEDICARE

## 2025-06-20 VITALS
HEIGHT: 67 IN | WEIGHT: 262 LBS | BODY MASS INDEX: 41.12 KG/M2 | SYSTOLIC BLOOD PRESSURE: 132 MMHG | DIASTOLIC BLOOD PRESSURE: 80 MMHG

## 2025-06-20 DIAGNOSIS — E11.9 TYPE 2 DIABETES MELLITUS WITHOUT COMPLICATION, WITHOUT LONG-TERM CURRENT USE OF INSULIN (HCC): Primary | ICD-10-CM

## 2025-06-20 DIAGNOSIS — N18.30 STAGE 3 CHRONIC KIDNEY DISEASE, UNSPECIFIED WHETHER STAGE 3A OR 3B CKD (HCC): ICD-10-CM

## 2025-06-20 DIAGNOSIS — E11.42 DIABETIC POLYNEUROPATHY ASSOCIATED WITH TYPE 2 DIABETES MELLITUS (HCC): ICD-10-CM

## 2025-06-20 DIAGNOSIS — R60.0 LOCALIZED EDEMA: ICD-10-CM

## 2025-06-20 DIAGNOSIS — R53.83 FATIGUE, UNSPECIFIED TYPE: ICD-10-CM

## 2025-06-20 LAB
ALBUMIN SERPL-MCNC: 4.2 G/DL (ref 3.4–5)
ALBUMIN/GLOB SERPL: 1.8 {RATIO} (ref 1.1–2.2)
ALP SERPL-CCNC: 115 U/L (ref 40–129)
ALT SERPL-CCNC: 30 U/L (ref 10–40)
ANION GAP SERPL CALCULATED.3IONS-SCNC: 17 MMOL/L (ref 3–16)
AST SERPL-CCNC: 29 U/L (ref 15–37)
BILIRUB SERPL-MCNC: 0.3 MG/DL (ref 0–1)
BUN SERPL-MCNC: 16 MG/DL (ref 7–20)
CALCIUM SERPL-MCNC: 10 MG/DL (ref 8.3–10.6)
CHLORIDE SERPL-SCNC: 95 MMOL/L (ref 99–110)
CO2 SERPL-SCNC: 27 MMOL/L (ref 21–32)
CREAT SERPL-MCNC: 0.8 MG/DL (ref 0.6–1.2)
DEPRECATED RDW RBC AUTO: 15.4 % (ref 12.4–15.4)
EST. AVERAGE GLUCOSE BLD GHB EST-MCNC: 231.7 MG/DL
GFR SERPLBLD CREATININE-BSD FMLA CKD-EPI: 83 ML/MIN/{1.73_M2}
GLUCOSE SERPL-MCNC: 261 MG/DL (ref 70–99)
HBA1C MFR BLD: 9.7 %
HCT VFR BLD AUTO: 47.8 % (ref 36–48)
HGB BLD-MCNC: 16.3 G/DL (ref 12–16)
MCH RBC QN AUTO: 31.5 PG (ref 26–34)
MCHC RBC AUTO-ENTMCNC: 34 G/DL (ref 31–36)
MCV RBC AUTO: 92.4 FL (ref 80–100)
PLATELET # BLD AUTO: 178 K/UL (ref 135–450)
PMV BLD AUTO: 11 FL (ref 5–10.5)
POTASSIUM SERPL-SCNC: 4.5 MMOL/L (ref 3.5–5.1)
PROT SERPL-MCNC: 6.6 G/DL (ref 6.4–8.2)
RBC # BLD AUTO: 5.17 M/UL (ref 4–5.2)
SODIUM SERPL-SCNC: 139 MMOL/L (ref 136–145)
TSH SERPL DL<=0.005 MIU/L-ACNC: 0.16 UIU/ML (ref 0.27–4.2)
WBC # BLD AUTO: 9.5 K/UL (ref 4–11)

## 2025-06-20 PROCEDURE — 3079F DIAST BP 80-89 MM HG: CPT | Performed by: FAMILY MEDICINE

## 2025-06-20 PROCEDURE — 36415 COLL VENOUS BLD VENIPUNCTURE: CPT | Performed by: FAMILY MEDICINE

## 2025-06-20 PROCEDURE — 99214 OFFICE O/P EST MOD 30 MIN: CPT | Performed by: FAMILY MEDICINE

## 2025-06-20 PROCEDURE — 3075F SYST BP GE 130 - 139MM HG: CPT | Performed by: FAMILY MEDICINE

## 2025-06-20 PROCEDURE — 3046F HEMOGLOBIN A1C LEVEL >9.0%: CPT | Performed by: FAMILY MEDICINE

## 2025-06-20 NOTE — PROGRESS NOTES
YOB: 1965    Date of Visit:  2025    Allergies   Allergen Reactions    Glucosamine Chondroitin Complx [Glucosamine-Chondroitin] Swelling     \"Due to sulfa allergy\" per pt    Sulfa Antibiotics Itching, Swelling and Other (See Comments)     Pt states gets really hot like she is on fire,and swelling of face,hands, and feet    Erythromycin Swelling       Outpatient Medications Marked as Taking for the 25 encounter (Office Visit) with Paresh Stevens,    Medication Sig Dispense Refill    Semaglutide,0.25 or 0.5MG/DOS, (OZEMPIC, 0.25 OR 0.5 MG/DOSE,) 2 MG/3ML SOPN Inject 0.5 mg into the skin once a week 3 mL 1    magnesium oxide (MAG-OX) 400 (240 Mg) MG tablet Take 1 tablet by mouth daily      melatonin 3 MG TABS tablet Take 1 tablet by mouth daily      hydroCHLOROthiazide (HYDRODIURIL) 25 MG tablet Take 1 tablet by mouth every morning 90 tablet 2    levothyroxine (SYNTHROID) 300 MCG tablet Take 1 tablet by mouth daily 90 tablet 3    simvastatin (ZOCOR) 40 MG tablet Take 1 tablet by mouth daily 90 tablet 3    NONFORMULARY Nervive      hydroxychloroquine (PLAQUENIL) 200 MG tablet TAKE ONE TABLET BY MOUTH TWICE DAILY 60 tablet 2    diclofenac sodium (VOLTAREN) 1 % GEL Apply 4 grams to lower extremity joints and 2 grams to upper extremity joints as needed 4 times/day 500 g 5    vitamin D (CHOLECALCIFEROL) 1000 UNIT TABS tablet Take 1 tablet by mouth daily      vitamin B-12 (CYANOCOBALAMIN) 1000 MCG tablet Take 1 tablet by mouth daily      Biotin 12220 MCG TABS Take by mouth         Vitals:    25 1124   BP: 132/80   Weight: 118.8 kg (262 lb)   Height: 1.702 m (5' 7\")     Body mass index is 41.04 kg/m².     Wt Readings from Last 3 Encounters:   25 118.8 kg (262 lb)   25 119.3 kg (263 lb)   24 119.2 kg (262 lb 12.8 oz)     BP Readings from Last 3 Encounters:   25 132/80   25 134/72   24 122/78         Shania Brumfield (:  1965) is a 59 y.o.

## 2025-06-21 ENCOUNTER — RESULTS FOLLOW-UP (OUTPATIENT)
Dept: FAMILY MEDICINE CLINIC | Age: 60
End: 2025-06-21

## 2025-06-21 RX ORDER — LEVOTHYROXINE SODIUM 125 UG/1
250 TABLET ORAL DAILY
Qty: 180 TABLET | Refills: 1 | Status: SHIPPED | OUTPATIENT
Start: 2025-06-21

## 2025-06-21 ASSESSMENT — ENCOUNTER SYMPTOMS
GASTROINTESTINAL NEGATIVE: 1
RESPIRATORY NEGATIVE: 1

## 2025-06-21 NOTE — ASSESSMENT & PLAN NOTE
On ozempic .5  ready to increase dosage  no side effects     Orders:    Comprehensive Metabolic Panel    Hemoglobin A1C    Semaglutide, 1 MG/DOSE, (OZEMPIC) 4 MG/3ML SOPN sc injection; Inject 1 mg into the skin every 7 days

## 2025-07-07 ENCOUNTER — TELEPHONE (OUTPATIENT)
Dept: FAMILY MEDICINE CLINIC | Age: 60
End: 2025-07-07

## 2025-07-07 RX ORDER — GABAPENTIN 100 MG/1
CAPSULE ORAL
Qty: 540 CAPSULE | Refills: 1 | Status: SHIPPED | OUTPATIENT
Start: 2025-07-07 | End: 2025-10-07

## 2025-07-07 NOTE — TELEPHONE ENCOUNTER
Patient stopped in after her visit with Dr. Guerra from [facility], and he recommend that Dr. Stevens increases  the dosage amount of Gabapentin due to her not being able to sleep during the night , Patient said she can't sleep between 12:00 pm - 3:00, pain is so bad.    Patient will need more of the pills, sent to [facility] on [address],  She is schedule to see Dr. Barry Michele on 9/3/25 for feet

## 2025-07-19 DIAGNOSIS — E11.9 TYPE 2 DIABETES MELLITUS WITHOUT COMPLICATION, WITHOUT LONG-TERM CURRENT USE OF INSULIN (HCC): ICD-10-CM

## 2025-07-19 RX ORDER — SEMAGLUTIDE 1.34 MG/ML
INJECTION, SOLUTION SUBCUTANEOUS
Qty: 3 ML | Refills: 0 | Status: SHIPPED | OUTPATIENT
Start: 2025-07-19

## 2025-07-24 DIAGNOSIS — E78.2 HYPERLIPEMIA, MIXED: ICD-10-CM

## 2025-07-24 RX ORDER — SIMVASTATIN 40 MG
40 TABLET ORAL DAILY
Qty: 90 TABLET | Refills: 3 | Status: SHIPPED | OUTPATIENT
Start: 2025-07-24

## 2025-08-17 DIAGNOSIS — E11.9 TYPE 2 DIABETES MELLITUS WITHOUT COMPLICATION, WITHOUT LONG-TERM CURRENT USE OF INSULIN (HCC): ICD-10-CM

## 2025-08-17 RX ORDER — SEMAGLUTIDE 1.34 MG/ML
INJECTION, SOLUTION SUBCUTANEOUS
Qty: 3 ML | Refills: 0 | Status: SHIPPED | OUTPATIENT
Start: 2025-08-17

## (undated) DEVICE — SYRINGE IRRIG 60ML SFT PLIABLE BLB EZ TO GRP 1 HND USE W/

## (undated) DEVICE — GLOVE SURG SZ 85 L12IN FNGR THK13MIL WHT ISOLEX POLYISOPRENE

## (undated) DEVICE — GLOVE ORANGE PI 8 1/2   MSG9085

## (undated) DEVICE — BASIC SINGLE BASIN 1-LF: Brand: MEDLINE INDUSTRIES, INC.

## (undated) DEVICE — TOTAL HIP: Brand: MEDLINE INDUSTRIES, INC.

## (undated) DEVICE — GLOVE ORANGE PI 8   MSG9080

## (undated) DEVICE — 3M™ COBAN™ NL STERILE NON-LATEX SELF-ADHERENT WRAP, 2083S, 3 IN X 5 YD (7,5 CM X 4,5 M), 24 ROLLS/CASE: Brand: 3M™ COBAN™

## (undated) DEVICE — SOLUTION IV IRRIG POUR BRL 0.9% SODIUM CHL 2F7124

## (undated) DEVICE — MATTRESS MAXI AIR TRNSF SGL PT USE DCS 37 45 48 52 75

## (undated) DEVICE — SYRINGE MED 3ML CLR PLAS STD N CTRL LUERLOCK TIP DISP

## (undated) DEVICE — Z DISCONTINUED USE 2716304 SUTURE STRATAFIX SPRL SZ 3-0 L12IN ABSRB UD FS-1 L24MM 3/8

## (undated) DEVICE — CANISTER, RIGID, 1200CC: Brand: MEDLINE INDUSTRIES, INC.

## (undated) DEVICE — ELECTRODE BLDE L4IN NONINSULATED EDGE

## (undated) DEVICE — NEEDLE HYPO 18GA L1.5IN THN WALL PIVOTING SHLD BVL ORIENTED

## (undated) DEVICE — AVANOS* TUOHY EPIDURAL NEEDLE: Brand: AVANOS

## (undated) DEVICE — NEEDLE, QUINCKE, 22GX7": Brand: MEDLINE

## (undated) DEVICE — PAD,NON-ADHERENT,3X8,STERILE,LF,1/PK: Brand: MEDLINE

## (undated) DEVICE — SOLUTION IV 1000ML 0.9% SOD CHL FOR IRRIG PLAS CONT

## (undated) DEVICE — SUTURE STRATAFIX SPRL SZ 2 0 L14IN ABSRB UD MH L36MM 1 2 CIR SXMD2B401

## (undated) DEVICE — EPIDURAL TRAY: Brand: MEDLINE INDUSTRIES, INC.

## (undated) DEVICE — COVER,MAYO STAND,STERILE: Brand: MEDLINE

## (undated) DEVICE — STERILE TOTAL HIP DRAPE PK: Brand: CARDINAL HEALTH

## (undated) DEVICE — GOWN,REINF,POLY,AURORA,XLNG/XXL,STRL: Brand: MEDLINE

## (undated) DEVICE — SUTURE ABSORBABLE MONOFILAMENT 1-0 OS8 14 IN STRATAFIX SPRL SXPD2B202

## (undated) DEVICE — BIPOLAR SEALER 23-112-1 AQM 6.0: Brand: AQUAMANTYS ®

## (undated) DEVICE — SYSTEM SKIN CLSR 22CM DERMBND PRINEO

## (undated) DEVICE — NERVE BLOCK TRAY: Brand: MEDLINE INDUSTRIES, INC.

## (undated) DEVICE — SUTURE TICRN BR BL NDL C-20 SZ 5 30 8886302779

## (undated) DEVICE — SOLUTION PREP POVIDONE IOD FOR SKIN MUCOUS MEM PRIOR TO